# Patient Record
Sex: MALE | Race: WHITE | Employment: OTHER | URBAN - METROPOLITAN AREA
[De-identification: names, ages, dates, MRNs, and addresses within clinical notes are randomized per-mention and may not be internally consistent; named-entity substitution may affect disease eponyms.]

---

## 2018-05-09 ENCOUNTER — HOSPITAL ENCOUNTER (OUTPATIENT)
Dept: MRI IMAGING | Age: 53
Discharge: HOME | End: 2018-05-09
Attending: NURSE PRACTITIONER | Admitting: NURSE PRACTITIONER

## 2018-05-09 DIAGNOSIS — G44.52 NEW DAILY PERSISTENT HEADACHE (NDPH): ICD-10-CM

## 2018-05-09 DIAGNOSIS — G44.52 NEW DAILY PERSISTENT HEADACHE: ICD-10-CM

## 2019-03-02 ENCOUNTER — HOSPITAL ENCOUNTER (OUTPATIENT)
Dept: CT IMAGING | Age: 54
Discharge: HOME | End: 2019-03-02
Payer: MEDICARE

## 2019-03-02 ENCOUNTER — HOSPITAL ENCOUNTER (OUTPATIENT)
Age: 54
Discharge: HOME | End: 2019-03-02
Payer: MEDICARE

## 2019-03-02 DIAGNOSIS — R10.9 ABDOMINAL PAIN, UNSPECIFIED ABDOMINAL LOCATION: ICD-10-CM

## 2019-03-02 DIAGNOSIS — R11.0 NAUSEA: ICD-10-CM

## 2019-03-02 DIAGNOSIS — R19.7 DIARRHEA, UNSPECIFIED TYPE: ICD-10-CM

## 2019-03-02 DIAGNOSIS — R63.4 LOSS OF WEIGHT: ICD-10-CM

## 2019-03-02 DIAGNOSIS — R11.10 VOMITING, INTRACTABILITY OF VOMITING NOT SPECIFIED, PRESENCE OF NAUSEA NOT SPECIFIED, UNSPECIFIED VOMITING TYPE: ICD-10-CM

## 2019-03-02 LAB
ANION GAP SERPL CALCULATED.3IONS-SCNC: 10 MMOL/L (ref 3–16)
BUN BLDV-MCNC: 10 MG/DL (ref 7–20)
CALCIUM SERPL-MCNC: 9.3 MG/DL (ref 8.3–10.6)
CHLORIDE BLD-SCNC: 106 MMOL/L (ref 99–110)
CO2: 23 MMOL/L (ref 21–32)
CREAT SERPL-MCNC: 1.8 MG/DL (ref 0.9–1.3)
GFR AFRICAN AMERICAN: 48
GFR NON-AFRICAN AMERICAN: 39
GLUCOSE BLD-MCNC: 94 MG/DL (ref 70–99)
POTASSIUM SERPL-SCNC: 4.4 MMOL/L (ref 3.5–5.1)
SODIUM BLD-SCNC: 139 MMOL/L (ref 136–145)

## 2019-03-02 PROCEDURE — 36415 COLL VENOUS BLD VENIPUNCTURE: CPT

## 2019-03-02 PROCEDURE — 80048 BASIC METABOLIC PNL TOTAL CA: CPT

## 2019-03-02 PROCEDURE — 6360000004 HC RX CONTRAST MEDICATION

## 2019-03-02 PROCEDURE — 74176 CT ABD & PELVIS W/O CONTRAST: CPT

## 2019-03-02 RX ADMIN — IOPAMIDOL 75 ML: 755 INJECTION, SOLUTION INTRAVENOUS at 13:02

## 2019-08-02 ENCOUNTER — APPOINTMENT (OUTPATIENT)
Dept: CT IMAGING | Age: 54
DRG: 885 | End: 2019-08-02
Payer: MEDICARE

## 2019-08-02 ENCOUNTER — HOSPITAL ENCOUNTER (INPATIENT)
Age: 54
LOS: 3 days | Discharge: HOME | DRG: 885 | End: 2019-08-05
Attending: EMERGENCY MEDICINE | Admitting: PSYCHIATRY & NEUROLOGY
Payer: MEDICARE

## 2019-08-02 DIAGNOSIS — F39 MOOD DISORDER (HCC): Primary | ICD-10-CM

## 2019-08-02 PROBLEM — F32.9 MDD (MAJOR DEPRESSIVE DISORDER), SINGLE EPISODE: Status: ACTIVE | Noted: 2019-08-02

## 2019-08-02 LAB
A/G RATIO: 1.7 (ref 1.1–2.2)
ALBUMIN SERPL-MCNC: 4.3 G/DL (ref 3.4–5)
ALP BLD-CCNC: 47 U/L (ref 40–129)
ALT SERPL-CCNC: 29 U/L (ref 10–40)
AMPHETAMINE SCREEN, URINE: ABNORMAL
ANION GAP SERPL CALCULATED.3IONS-SCNC: 13 MMOL/L (ref 3–16)
AST SERPL-CCNC: 40 U/L (ref 15–37)
BARBITURATE SCREEN URINE: ABNORMAL
BASOPHILS ABSOLUTE: 0.1 K/UL (ref 0–0.2)
BASOPHILS RELATIVE PERCENT: 1.2 %
BENZODIAZEPINE SCREEN, URINE: ABNORMAL
BILIRUB SERPL-MCNC: 0.6 MG/DL (ref 0–1)
BILIRUBIN URINE: NEGATIVE
BLOOD, URINE: NEGATIVE
BUN BLDV-MCNC: 10 MG/DL (ref 7–20)
CALCIUM SERPL-MCNC: 9.3 MG/DL (ref 8.3–10.6)
CANNABINOID SCREEN URINE: POSITIVE
CHLORIDE BLD-SCNC: 97 MMOL/L (ref 99–110)
CLARITY: CLEAR
CO2: 21 MMOL/L (ref 21–32)
COCAINE METABOLITE SCREEN URINE: ABNORMAL
COLOR: YELLOW
CREAT SERPL-MCNC: 1.7 MG/DL (ref 0.9–1.3)
EOSINOPHILS ABSOLUTE: 0.5 K/UL (ref 0–0.6)
EOSINOPHILS RELATIVE PERCENT: 8.7 %
ETHANOL: 15 MG/DL (ref 0–0.08)
GFR AFRICAN AMERICAN: 51
GFR NON-AFRICAN AMERICAN: 42
GLOBULIN: 2.5 G/DL
GLUCOSE BLD-MCNC: 111 MG/DL (ref 70–99)
GLUCOSE URINE: NEGATIVE MG/DL
HCT VFR BLD CALC: 29.4 % (ref 40.5–52.5)
HEMOGLOBIN: 9.9 G/DL (ref 13.5–17.5)
KETONES, URINE: NEGATIVE MG/DL
LEUKOCYTE ESTERASE, URINE: NEGATIVE
LYMPHOCYTES ABSOLUTE: 1.5 K/UL (ref 1–5.1)
LYMPHOCYTES RELATIVE PERCENT: 27.9 %
Lab: ABNORMAL
MCH RBC QN AUTO: 31.5 PG (ref 26–34)
MCHC RBC AUTO-ENTMCNC: 33.5 G/DL (ref 31–36)
MCV RBC AUTO: 94.2 FL (ref 80–100)
METHADONE SCREEN, URINE: ABNORMAL
MICROSCOPIC EXAMINATION: NORMAL
MONOCYTES ABSOLUTE: 0.4 K/UL (ref 0–1.3)
MONOCYTES RELATIVE PERCENT: 7.2 %
NEUTROPHILS ABSOLUTE: 2.9 K/UL (ref 1.7–7.7)
NEUTROPHILS RELATIVE PERCENT: 55 %
NITRITE, URINE: NEGATIVE
OPIATE SCREEN URINE: ABNORMAL
OXYCODONE URINE: ABNORMAL
PDW BLD-RTO: 15.9 % (ref 12.4–15.4)
PH UA: 5
PH UA: 5 (ref 5–8)
PHENCYCLIDINE SCREEN URINE: ABNORMAL
PLATELET # BLD: 341 K/UL (ref 135–450)
PMV BLD AUTO: 7.3 FL (ref 5–10.5)
POTASSIUM SERPL-SCNC: 3.5 MMOL/L (ref 3.5–5.1)
PROPOXYPHENE SCREEN: ABNORMAL
PROTEIN UA: NEGATIVE MG/DL
RBC # BLD: 3.13 M/UL (ref 4.2–5.9)
SODIUM BLD-SCNC: 131 MMOL/L (ref 136–145)
SPECIFIC GRAVITY UA: 1.02 (ref 1–1.03)
TOTAL PROTEIN: 6.8 G/DL (ref 6.4–8.2)
TSH SERPL DL<=0.05 MIU/L-ACNC: 1.64 UIU/ML (ref 0.27–4.2)
URINE REFLEX TO CULTURE: NORMAL
URINE TYPE: NORMAL
UROBILINOGEN, URINE: 0.2 E.U./DL
WBC # BLD: 5.3 K/UL (ref 4–11)

## 2019-08-02 PROCEDURE — 84443 ASSAY THYROID STIM HORMONE: CPT

## 2019-08-02 PROCEDURE — 81003 URINALYSIS AUTO W/O SCOPE: CPT

## 2019-08-02 PROCEDURE — 70450 CT HEAD/BRAIN W/O DYE: CPT

## 2019-08-02 PROCEDURE — 80053 COMPREHEN METABOLIC PANEL: CPT

## 2019-08-02 PROCEDURE — 6370000000 HC RX 637 (ALT 250 FOR IP): Performed by: EMERGENCY MEDICINE

## 2019-08-02 PROCEDURE — 83036 HEMOGLOBIN GLYCOSYLATED A1C: CPT

## 2019-08-02 PROCEDURE — 85025 COMPLETE CBC W/AUTO DIFF WBC: CPT

## 2019-08-02 PROCEDURE — G0480 DRUG TEST DEF 1-7 CLASSES: HCPCS

## 2019-08-02 PROCEDURE — 1240000000 HC EMOTIONAL WELLNESS R&B

## 2019-08-02 PROCEDURE — 80307 DRUG TEST PRSMV CHEM ANLYZR: CPT

## 2019-08-02 PROCEDURE — 99285 EMERGENCY DEPT VISIT HI MDM: CPT

## 2019-08-02 PROCEDURE — 36415 COLL VENOUS BLD VENIPUNCTURE: CPT

## 2019-08-02 RX ORDER — MAGNESIUM HYDROXIDE/ALUMINUM HYDROXICE/SIMETHICONE 120; 1200; 1200 MG/30ML; MG/30ML; MG/30ML
30 SUSPENSION ORAL EVERY 6 HOURS PRN
Status: DISCONTINUED | OUTPATIENT
Start: 2019-08-02 | End: 2019-08-05 | Stop reason: HOSPADM

## 2019-08-02 RX ORDER — NICOTINE 21 MG/24HR
1 PATCH, TRANSDERMAL 24 HOURS TRANSDERMAL DAILY
Status: DISCONTINUED | OUTPATIENT
Start: 2019-08-03 | End: 2019-08-05 | Stop reason: HOSPADM

## 2019-08-02 RX ORDER — CLONAZEPAM 1 MG/1
1 TABLET ORAL 3 TIMES DAILY
Status: ON HOLD | COMMUNITY
End: 2019-08-05 | Stop reason: HOSPADM

## 2019-08-02 RX ORDER — IBUPROFEN 600 MG/1
600 TABLET ORAL ONCE
Status: COMPLETED | OUTPATIENT
Start: 2019-08-02 | End: 2019-08-02

## 2019-08-02 RX ORDER — TRAZODONE HYDROCHLORIDE 50 MG/1
50 TABLET ORAL NIGHTLY PRN
Status: DISCONTINUED | OUTPATIENT
Start: 2019-08-03 | End: 2019-08-05 | Stop reason: HOSPADM

## 2019-08-02 RX ORDER — ACETAMINOPHEN 325 MG/1
650 TABLET ORAL EVERY 4 HOURS PRN
Status: DISCONTINUED | OUTPATIENT
Start: 2019-08-02 | End: 2019-08-05 | Stop reason: HOSPADM

## 2019-08-02 RX ORDER — IBUPROFEN 400 MG/1
400 TABLET ORAL EVERY 6 HOURS PRN
Status: DISCONTINUED | OUTPATIENT
Start: 2019-08-02 | End: 2019-08-05 | Stop reason: HOSPADM

## 2019-08-02 RX ORDER — CLONAZEPAM 1 MG/1
1 TABLET ORAL 3 TIMES DAILY PRN
Status: DISCONTINUED | OUTPATIENT
Start: 2019-08-02 | End: 2019-08-05 | Stop reason: HOSPADM

## 2019-08-02 RX ADMIN — IBUPROFEN 600 MG: 600 TABLET, FILM COATED ORAL at 16:56

## 2019-08-02 ASSESSMENT — SLEEP AND FATIGUE QUESTIONNAIRES
DIFFICULTY FALLING ASLEEP: YES
RESTFUL SLEEP: NO
DIFFICULTY ARISING: NO
AVERAGE NUMBER OF SLEEP HOURS: 1
DIFFICULTY STAYING ASLEEP: YES
DO YOU HAVE DIFFICULTY SLEEPING: YES
DO YOU USE A SLEEP AID: YES
SLEEP PATTERN: DIFFICULTY FALLING ASLEEP;DISTURBED/INTERRUPTED SLEEP

## 2019-08-02 ASSESSMENT — PAIN DESCRIPTION - PROGRESSION: CLINICAL_PROGRESSION: NOT CHANGED

## 2019-08-02 ASSESSMENT — PAIN SCALES - GENERAL
PAINLEVEL_OUTOF10: 10
PAINLEVEL_OUTOF10: 6

## 2019-08-02 ASSESSMENT — PAIN DESCRIPTION - LOCATION
LOCATION: GENERALIZED
LOCATION: SHOULDER;NECK

## 2019-08-02 ASSESSMENT — PAIN DESCRIPTION - PAIN TYPE: TYPE: CHRONIC PAIN

## 2019-08-02 ASSESSMENT — PAIN - FUNCTIONAL ASSESSMENT: PAIN_FUNCTIONAL_ASSESSMENT: ACTIVITIES ARE NOT PREVENTED

## 2019-08-02 ASSESSMENT — PAIN DESCRIPTION - DESCRIPTORS: DESCRIPTORS: CONSTANT

## 2019-08-02 ASSESSMENT — PAIN DESCRIPTION - FREQUENCY: FREQUENCY: CONTINUOUS

## 2019-08-02 ASSESSMENT — PAIN DESCRIPTION - ORIENTATION: ORIENTATION: LEFT

## 2019-08-02 ASSESSMENT — LIFESTYLE VARIABLES: HISTORY_ALCOHOL_USE: NO

## 2019-08-02 ASSESSMENT — PAIN DESCRIPTION - ONSET: ONSET: UNABLE TO ASSESS
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
## 2019-08-03 PROBLEM — E44.0 PROTEIN-CALORIE MALNUTRITION, MODERATE (HCC): Status: ACTIVE | Noted: 2019-08-03

## 2019-08-03 PROCEDURE — 99222 1ST HOSP IP/OBS MODERATE 55: CPT | Performed by: PHYSICIAN ASSISTANT

## 2019-08-03 PROCEDURE — 6370000000 HC RX 637 (ALT 250 FOR IP): Performed by: PSYCHIATRY & NEUROLOGY

## 2019-08-03 PROCEDURE — 1240000000 HC EMOTIONAL WELLNESS R&B

## 2019-08-03 PROCEDURE — 6370000000 HC RX 637 (ALT 250 FOR IP): Performed by: PHYSICIAN ASSISTANT

## 2019-08-03 RX ORDER — FENOFIBRATE 54 MG/1
54 TABLET ORAL DAILY
Status: COMPLETED | OUTPATIENT
Start: 2019-08-03 | End: 2019-08-03

## 2019-08-03 RX ORDER — METOPROLOL TARTRATE 50 MG/1
100 TABLET, FILM COATED ORAL 2 TIMES DAILY
Status: DISCONTINUED | OUTPATIENT
Start: 2019-08-03 | End: 2019-08-05 | Stop reason: HOSPADM

## 2019-08-03 RX ORDER — VENLAFAXINE HYDROCHLORIDE 75 MG/1
150 CAPSULE, EXTENDED RELEASE ORAL
Status: DISCONTINUED | OUTPATIENT
Start: 2019-08-04 | End: 2019-08-05 | Stop reason: HOSPADM

## 2019-08-03 RX ORDER — TAMSULOSIN HYDROCHLORIDE 0.4 MG/1
0.4 CAPSULE ORAL DAILY
Status: DISCONTINUED | OUTPATIENT
Start: 2019-08-03 | End: 2019-08-05 | Stop reason: HOSPADM

## 2019-08-03 RX ORDER — QUETIAPINE FUMARATE 200 MG/1
400 TABLET, FILM COATED ORAL NIGHTLY
Status: DISCONTINUED | OUTPATIENT
Start: 2019-08-03 | End: 2019-08-05 | Stop reason: HOSPADM

## 2019-08-03 RX ORDER — AMLODIPINE BESYLATE 5 MG/1
10 TABLET ORAL DAILY
Status: DISCONTINUED | OUTPATIENT
Start: 2019-08-03 | End: 2019-08-05 | Stop reason: HOSPADM

## 2019-08-03 RX ORDER — DOCUSATE SODIUM 100 MG/1
100 CAPSULE, LIQUID FILLED ORAL 2 TIMES DAILY PRN
Status: DISCONTINUED | OUTPATIENT
Start: 2019-08-03 | End: 2019-08-05 | Stop reason: HOSPADM

## 2019-08-03 RX ORDER — PANTOPRAZOLE SODIUM 40 MG/1
40 TABLET, DELAYED RELEASE ORAL DAILY
Status: DISCONTINUED | OUTPATIENT
Start: 2019-08-03 | End: 2019-08-05 | Stop reason: HOSPADM

## 2019-08-03 RX ADMIN — METFORMIN HYDROCHLORIDE 850 MG: 850 TABLET ORAL at 16:49

## 2019-08-03 RX ADMIN — METOPROLOL TARTRATE 100 MG: 50 TABLET ORAL at 19:53

## 2019-08-03 RX ADMIN — PANTOPRAZOLE SODIUM 40 MG: 40 TABLET, DELAYED RELEASE ORAL at 14:16

## 2019-08-03 RX ADMIN — CLONAZEPAM 1 MG: 1 TABLET ORAL at 14:15

## 2019-08-03 RX ADMIN — ACETAMINOPHEN 650 MG: 325 TABLET ORAL at 15:53

## 2019-08-03 RX ADMIN — QUETIAPINE FUMARATE 400 MG: 200 TABLET ORAL at 19:53

## 2019-08-03 RX ADMIN — FENOFIBRATE 54 MG: 54 TABLET ORAL at 14:15

## 2019-08-03 RX ADMIN — AMLODIPINE BESYLATE 10 MG: 5 TABLET ORAL at 14:15

## 2019-08-03 RX ADMIN — HYDROCORTISONE: 25 CREAM TOPICAL at 18:16

## 2019-08-03 RX ADMIN — METOPROLOL TARTRATE 100 MG: 50 TABLET ORAL at 14:15

## 2019-08-03 RX ADMIN — TAMSULOSIN HYDROCHLORIDE 0.4 MG: 0.4 CAPSULE ORAL at 14:15

## 2019-08-03 RX ADMIN — CLONAZEPAM 1 MG: 1 TABLET ORAL at 22:26

## 2019-08-03 RX ADMIN — IBUPROFEN 400 MG: 400 TABLET ORAL at 10:11

## 2019-08-03 RX ADMIN — ACETAMINOPHEN 650 MG: 325 TABLET ORAL at 08:42

## 2019-08-03 ASSESSMENT — PAIN SCALES - GENERAL
PAINLEVEL_OUTOF10: 6
PAINLEVEL_OUTOF10: 5
PAINLEVEL_OUTOF10: 7
PAINLEVEL_OUTOF10: 4
PAINLEVEL_OUTOF10: 0
PAINLEVEL_OUTOF10: 1
PAINLEVEL_OUTOF10: 10

## 2019-08-03 ASSESSMENT — LIFESTYLE VARIABLES: HISTORY_ALCOHOL_USE: YES

## 2019-08-03 NOTE — GROUP NOTE
Group Therapy Note    Date: August 3    Group Start Time: 1000  Group End Time: 1100  Group Topic: Healthy Living/Wellness    [address], RN        Group Therapy Note    Pt attended wellness group on the unit about boundaries          Attendees: 7/14         Patient's Goal:  To learn about healthy boundaries and how to set and follow them    Notes:  Pt attended group and participated appropriately at times. Status After Intervention:  Unchanged    Participation Level:  Active Listener and Interactive    Participation Quality: Appropriate, Attentive, Sharing and Supportive      Speech:  normal      Thought Process/Content: Linear      Affective Functioning: Congruent      Mood: anxious and depressed      Level of consciousness:  Alert, Oriented x4 and Attentive      Response to Learning: Able to verbalize current knowledge/experience, Able to verbalize/acknowledge new learning and Able to retain information      Endings: None Reported    Modes of Intervention: Education and Problem-solving      Discipline Responsible: Registered Nurse      Signature:  Victoria Muñoz RN

## 2019-08-03 NOTE — PLAN OF CARE
Nutrition Problem:  Moderate malnutrition  Intervention: Food and/or Nutrient Delivery: Continue current diet, Start ONS  Nutritional Goals: pt will consume > 75% of meals and supps and weight will be 130# or >

## 2019-08-04 LAB
ESTIMATED AVERAGE GLUCOSE: 105.4 MG/DL
HBA1C MFR BLD: 5.3 %

## 2019-08-04 PROCEDURE — 6370000000 HC RX 637 (ALT 250 FOR IP): Performed by: PHYSICIAN ASSISTANT

## 2019-08-04 PROCEDURE — 6370000000 HC RX 637 (ALT 250 FOR IP): Performed by: PSYCHIATRY & NEUROLOGY

## 2019-08-04 PROCEDURE — 1240000000 HC EMOTIONAL WELLNESS R&B

## 2019-08-04 RX ADMIN — METFORMIN HYDROCHLORIDE 850 MG: 850 TABLET ORAL at 18:16

## 2019-08-04 RX ADMIN — METOPROLOL TARTRATE 100 MG: 50 TABLET ORAL at 21:02

## 2019-08-04 RX ADMIN — VENLAFAXINE HYDROCHLORIDE 150 MG: 75 CAPSULE, EXTENDED RELEASE ORAL at 08:34

## 2019-08-04 RX ADMIN — HYDROCORTISONE: 25 CREAM TOPICAL at 08:38

## 2019-08-04 RX ADMIN — CLONAZEPAM 1 MG: 1 TABLET ORAL at 08:34

## 2019-08-04 RX ADMIN — IBUPROFEN 400 MG: 400 TABLET ORAL at 11:34

## 2019-08-04 RX ADMIN — TAMSULOSIN HYDROCHLORIDE 0.4 MG: 0.4 CAPSULE ORAL at 08:34

## 2019-08-04 RX ADMIN — CLONAZEPAM 1 MG: 1 TABLET ORAL at 14:40

## 2019-08-04 RX ADMIN — HYDROCORTISONE: 25 CREAM TOPICAL at 21:05

## 2019-08-04 RX ADMIN — ACETAMINOPHEN 650 MG: 325 TABLET ORAL at 09:39

## 2019-08-04 RX ADMIN — QUETIAPINE FUMARATE 400 MG: 200 TABLET ORAL at 21:02

## 2019-08-04 RX ADMIN — METOPROLOL TARTRATE 100 MG: 50 TABLET ORAL at 08:34

## 2019-08-04 RX ADMIN — ACETAMINOPHEN 650 MG: 325 TABLET ORAL at 16:11

## 2019-08-04 RX ADMIN — IBUPROFEN 400 MG: 400 TABLET ORAL at 19:24

## 2019-08-04 RX ADMIN — AMLODIPINE BESYLATE 10 MG: 5 TABLET ORAL at 08:33

## 2019-08-04 RX ADMIN — PANTOPRAZOLE SODIUM 40 MG: 40 TABLET, DELAYED RELEASE ORAL at 08:34

## 2019-08-04 RX ADMIN — METFORMIN HYDROCHLORIDE 850 MG: 850 TABLET ORAL at 08:34

## 2019-08-04 ASSESSMENT — PAIN SCALES - GENERAL
PAINLEVEL_OUTOF10: 6
PAINLEVEL_OUTOF10: 8
PAINLEVEL_OUTOF10: 6
PAINLEVEL_OUTOF10: 1
PAINLEVEL_OUTOF10: 6

## 2019-08-04 NOTE — PLAN OF CARE
Patient rates Depression 1/10 and Anxiety 0/10. Patient denies SI/HI/A/V/H. Patient seclusive to his bed and room. Patient refused to attended wrap up group. Patient took HS medications. Patient resting quietly in bed. No c/o's voiced at present.

## 2019-08-04 NOTE — BH NOTE
22:26 Patient requesting his Klonopin 1 mg. Anxiety rated 10/10. Patient medicated with Klonopin 1 mg po for anxiety.
[facility]  Initial Interdisciplinary Treatment Plan NOTE    Review Date & Time: 08/03/2019 1000    Patient was not in treatment team    Admission Type:   Admission Type: Involuntary    Reason for admission:  Reason for Admission: Pt brought in by cousin       Estimated Length of Stay Update:  1-3 days   Estimated Discharge Date Update: 1-3 days     PATIENT STRENGTHS:  Patient Strengths Strengths: Communication, Connection to output provider  Patient Strengths and Limitations:Limitations: Tendency to isolate self  Addictive Behavior:Addictive Behavior  In the past 3 months, have you felt or has someone told you that you have a problem with:  : None  Do you have a history of Chemical Use?: Yes(pt states he has been clean 10 years)  Do you have a history of Alcohol Use?: Yes  Do you have a history of Street Drug Abuse?: Yes  Histroy of Prescripton Drug Abuse?: Yes  Medical Problems:  Past Medical History:   Diagnosis Date    Anxiety     Arthritis     Clostridium difficile diarrhea 1/10/15    Depression     Difficulty urinating     DM [facility]) [facility]     Hematoma     pt unsure where it was    Hx of blood clots     pt unsure where    Hyperlipidemia     Hypertension     Pneumonia     Psychosis ([facility]        EDUCATION:   Learner Progress Toward Treatment Goals: Reviewed results and recommendations of this team    Method: Individual    Outcome: Verbalized understanding    PATIENT GOALS: \" to see the doctor. \"     PLAN/TREATMENT RECOMMENDATIONS UPDATE: maintain safety, medication management     GOALS UPDATE:   Time frame for Short-Term Goals: by time of discharge     Rodo Gamez RN
Pt c/o headache.  PRN motrin given per request.
(included triggers and roadblocks)                    ( )  Coping skills (new ways to manage stress, exercise, relaxation techniques, changing routine, distraction)                                                           ( )  Basic information about quitting (benefits of quitting, techniques in how to quit, available resources  ( ) Referral for counseling faxed to [facility]                                           ( ) Patient refused counseling  ( ) Patient has not smoked in the last 30 days    Metabolic Screening:    Lab Results   Component Value Date    LABA1C 7.6 01/09/2015       No results found for: CHOL  No results found for: TRIG  No results found for: HDL  No components found for: LDLCAL  No results found for: LABVLDL      Body mass index is 19.37 kg/m². BP Readings from Last 2 Encounters:   08/02/19 (!) 154/70   11/30/16 126/87           Pt admitted with followings belongings:  Dentures: None  Vision - Corrective Lenses: None  Hearing Aid: None  Jewelry: None  Body Piercings Removed: N/A  Clothing: Pants, Shirt  Were All Patient Medications Collected?: Not Applicable  Other Valuables: Money (Comment)($440 to safe)     Valuables placed in locker. Valuables placed in safe in security envelope. Patient's home medications were none. Patient oriented to surroundings and program expectations and copy of patient rights given. Received admission packet:  yes. Consents reviewed, signed yes. Refused none. Patient verbalize understanding:  yes.     Patient education on precautions: yes                   Ayaka Herring, RN

## 2019-08-05 VITALS
HEIGHT: 66 IN | BODY MASS INDEX: 19.29 KG/M2 | SYSTOLIC BLOOD PRESSURE: 113 MMHG | WEIGHT: 120 LBS | OXYGEN SATURATION: 99 % | TEMPERATURE: 98 F | RESPIRATION RATE: 16 BRPM | HEART RATE: 82 BPM | DIASTOLIC BLOOD PRESSURE: 61 MMHG

## 2019-08-05 PROCEDURE — 6370000000 HC RX 637 (ALT 250 FOR IP): Performed by: PSYCHIATRY & NEUROLOGY

## 2019-08-05 PROCEDURE — 5130000000 HC BRIDGE APPOINTMENT

## 2019-08-05 PROCEDURE — 6370000000 HC RX 637 (ALT 250 FOR IP): Performed by: PHYSICIAN ASSISTANT

## 2019-08-05 PROCEDURE — 99239 HOSP IP/OBS DSCHRG MGMT >30: CPT | Performed by: PSYCHIATRY & NEUROLOGY

## 2019-08-05 RX ORDER — QUETIAPINE FUMARATE 400 MG/1
400 TABLET, FILM COATED ORAL NIGHTLY
Qty: 60 TABLET | Refills: 3 | Status: SHIPPED | OUTPATIENT
Start: 2019-08-05 | End: 2019-08-22 | Stop reason: SDUPTHER

## 2019-08-05 RX ORDER — METOPROLOL TARTRATE 100 MG/1
100 TABLET ORAL 2 TIMES DAILY
Qty: 60 TABLET | Refills: 0 | Status: ON HOLD | OUTPATIENT
Start: 2019-08-05 | End: 2019-08-26 | Stop reason: HOSPADM

## 2019-08-05 RX ORDER — VENLAFAXINE HYDROCHLORIDE 150 MG/1
150 CAPSULE, EXTENDED RELEASE ORAL
Qty: 30 CAPSULE | Refills: 3 | Status: ON HOLD | OUTPATIENT
Start: 2019-08-06 | End: 2019-08-26 | Stop reason: HOSPADM

## 2019-08-05 RX ORDER — TAMSULOSIN HYDROCHLORIDE 0.4 MG/1
0.4 CAPSULE ORAL DAILY
Qty: 30 CAPSULE | Refills: 0 | Status: ON HOLD | OUTPATIENT
Start: 2019-08-05 | End: 2020-03-11 | Stop reason: HOSPADM

## 2019-08-05 RX ORDER — PANTOPRAZOLE SODIUM 40 MG/1
40 TABLET, DELAYED RELEASE ORAL DAILY
Qty: 30 TABLET | Refills: 3 | Status: ON HOLD | OUTPATIENT
Start: 2019-08-06 | End: 2020-03-11 | Stop reason: HOSPADM

## 2019-08-05 RX ORDER — AMLODIPINE BESYLATE 10 MG/1
10 TABLET ORAL DAILY
Qty: 30 TABLET | Refills: 0 | Status: ON HOLD | OUTPATIENT
Start: 2019-08-05 | End: 2019-08-26 | Stop reason: HOSPADM

## 2019-08-05 RX ORDER — PANTOPRAZOLE SODIUM 40 MG/1
40 TABLET, DELAYED RELEASE ORAL DAILY
Qty: 30 TABLET | Refills: 0 | Status: SHIPPED | OUTPATIENT
Start: 2019-08-05 | End: 2019-08-08

## 2019-08-05 RX ORDER — TAMSULOSIN HYDROCHLORIDE 0.4 MG/1
0.4 CAPSULE ORAL DAILY
Qty: 30 CAPSULE | Refills: 3 | Status: SHIPPED | OUTPATIENT
Start: 2019-08-06 | End: 2019-08-22 | Stop reason: SDUPTHER

## 2019-08-05 RX ORDER — QUETIAPINE FUMARATE 400 MG/1
800 TABLET, FILM COATED ORAL NIGHTLY
Qty: 60 TABLET | Refills: 0 | Status: ON HOLD | OUTPATIENT
Start: 2019-08-05 | End: 2020-03-11 | Stop reason: HOSPADM

## 2019-08-05 RX ADMIN — AMLODIPINE BESYLATE 10 MG: 5 TABLET ORAL at 08:47

## 2019-08-05 RX ADMIN — VENLAFAXINE HYDROCHLORIDE 150 MG: 75 CAPSULE, EXTENDED RELEASE ORAL at 08:46

## 2019-08-05 RX ADMIN — TAMSULOSIN HYDROCHLORIDE 0.4 MG: 0.4 CAPSULE ORAL at 08:47

## 2019-08-05 RX ADMIN — NICOTINE POLACRILEX 2 MG: 2 GUM, CHEWING BUCCAL at 13:53

## 2019-08-05 RX ADMIN — CLONAZEPAM 1 MG: 1 TABLET ORAL at 13:53

## 2019-08-05 RX ADMIN — METFORMIN HYDROCHLORIDE 850 MG: 850 TABLET ORAL at 08:54

## 2019-08-05 RX ADMIN — CLONAZEPAM 1 MG: 1 TABLET ORAL at 08:54

## 2019-08-05 RX ADMIN — PANTOPRAZOLE SODIUM 40 MG: 40 TABLET, DELAYED RELEASE ORAL at 08:47

## 2019-08-05 RX ADMIN — METOPROLOL TARTRATE 100 MG: 50 TABLET ORAL at 08:48

## 2019-08-05 NOTE — PROGRESS NOTES
Chief Complaint:  psychosis  Patients chart was reviewed and collaborated with staff as well around discharge planning. Reviewed with the patient. Dictated discharge summary. At this time patient is not probateable or holdable and is not suicidal/homicidal. Spent 35 minutes on discharge, and more then 50 % of that time was spent with counseling patient on discharge goals.
Orders: None  · Anthropometric Measures:  · Ht: 5' 6\" (167.6 cm)   · Current Body Wt: 130 lb (59 kg)  · Admission Body Wt: 111 lb (50.3 kg)(REPORTED)  · Usual Body Wt: 240 lb (108.9 kg)  · % Weight Change:  ,  45% in 1 year per report   · Ideal Body Wt: 142 lb (64.4 kg), % Ideal Body 91  · BMI Classification: BMI <18.5 Underweight    Nutrition Interventions:   Continue current diet, Start ONS  Continued Inpatient Monitoring, Coordination of Care, Coordination of Community Care    Nutrition Evaluation:   · Evaluation: Goals set   · Goals: pt will consume > 75% of meals and supps and weight will be 130# or >     · Monitoring: Meal Intake, Supplement Intake, Weight      Electronically signed by Fantasma Bedolla RD, LD on 8/3/19 at [facility]    Contact Number: [phone]

## 2019-08-08 ENCOUNTER — HOSPITAL ENCOUNTER (EMERGENCY)
Age: 54
Discharge: HOME | End: 2019-08-08
Attending: EMERGENCY MEDICINE
Payer: MEDICARE

## 2019-08-08 VITALS
WEIGHT: 135 LBS | HEIGHT: 67 IN | OXYGEN SATURATION: 100 % | TEMPERATURE: 98.6 F | RESPIRATION RATE: 16 BRPM | BODY MASS INDEX: 21.19 KG/M2 | SYSTOLIC BLOOD PRESSURE: 144 MMHG | DIASTOLIC BLOOD PRESSURE: 88 MMHG | HEART RATE: 113 BPM

## 2019-08-08 DIAGNOSIS — F10.920 ACUTE ALCOHOLIC INTOXICATION WITHOUT COMPLICATION (HCC): Primary | ICD-10-CM
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
## 2019-08-08 LAB
A/G RATIO: 1.6 (ref 1.1–2.2)
ACETAMINOPHEN LEVEL: <5 UG/ML (ref 10–30)
ALBUMIN SERPL-MCNC: 4.7 G/DL (ref 3.4–5)
ALP BLD-CCNC: 56 U/L (ref 40–129)
ALT SERPL-CCNC: 15 U/L (ref 10–40)
AMPHETAMINE SCREEN, URINE: ABNORMAL
ANION GAP SERPL CALCULATED.3IONS-SCNC: 17 MMOL/L (ref 3–16)
AST SERPL-CCNC: 34 U/L (ref 15–37)
BARBITURATE SCREEN URINE: ABNORMAL
BASOPHILS ABSOLUTE: 0.1 K/UL (ref 0–0.2)
BASOPHILS RELATIVE PERCENT: 1.1 %
BENZODIAZEPINE SCREEN, URINE: ABNORMAL
BILIRUB SERPL-MCNC: 0.3 MG/DL (ref 0–1)
BUN BLDV-MCNC: 21 MG/DL (ref 7–20)
CALCIUM SERPL-MCNC: 9.1 MG/DL (ref 8.3–10.6)
CANNABINOID SCREEN URINE: POSITIVE
CHLORIDE BLD-SCNC: 91 MMOL/L (ref 99–110)
CO2: 21 MMOL/L (ref 21–32)
COCAINE METABOLITE SCREEN URINE: ABNORMAL
CREAT SERPL-MCNC: 2 MG/DL (ref 0.9–1.3)
EOSINOPHILS ABSOLUTE: 0.3 K/UL (ref 0–0.6)
EOSINOPHILS RELATIVE PERCENT: 5.7 %
ETHANOL: 193 MG/DL (ref 0–0.08)
ETHANOL: 49 MG/DL (ref 0–0.08)
GFR AFRICAN AMERICAN: 42
GFR NON-AFRICAN AMERICAN: 35
GLOBULIN: 3 G/DL
GLUCOSE BLD-MCNC: 143 MG/DL (ref 70–99)
HCT VFR BLD CALC: 30.6 % (ref 40.5–52.5)
HEMOGLOBIN: 10.4 G/DL (ref 13.5–17.5)
LYMPHOCYTES ABSOLUTE: 1.6 K/UL (ref 1–5.1)
LYMPHOCYTES RELATIVE PERCENT: 26.9 %
Lab: ABNORMAL
MAGNESIUM: 2.2 MG/DL (ref 1.8–2.4)
MCH RBC QN AUTO: 32.1 PG (ref 26–34)
MCHC RBC AUTO-ENTMCNC: 33.8 G/DL (ref 31–36)
MCV RBC AUTO: 94.9 FL (ref 80–100)
METHADONE SCREEN, URINE: ABNORMAL
MONOCYTES ABSOLUTE: 0.5 K/UL (ref 0–1.3)
MONOCYTES RELATIVE PERCENT: 9.1 %
NEUTROPHILS ABSOLUTE: 3.4 K/UL (ref 1.7–7.7)
NEUTROPHILS RELATIVE PERCENT: 57.2 %
OPIATE SCREEN URINE: ABNORMAL
OXYCODONE URINE: ABNORMAL
PDW BLD-RTO: 15.8 % (ref 12.4–15.4)
PH UA: 5
PHENCYCLIDINE SCREEN URINE: ABNORMAL
PLATELET # BLD: 291 K/UL (ref 135–450)
PMV BLD AUTO: 7.7 FL (ref 5–10.5)
POTASSIUM REFLEX MAGNESIUM: 3.5 MMOL/L (ref 3.5–5.1)
PROPOXYPHENE SCREEN: ABNORMAL
RBC # BLD: 3.23 M/UL (ref 4.2–5.9)
SALICYLATE, SERUM: 1.4 MG/DL (ref 15–30)
SODIUM BLD-SCNC: 129 MMOL/L (ref 136–145)
TOTAL PROTEIN: 7.7 G/DL (ref 6.4–8.2)
WBC # BLD: 5.9 K/UL (ref 4–11)

## 2019-08-08 PROCEDURE — G0480 DRUG TEST DEF 1-7 CLASSES: HCPCS

## 2019-08-08 PROCEDURE — 6370000000 HC RX 637 (ALT 250 FOR IP): Performed by: EMERGENCY MEDICINE

## 2019-08-08 PROCEDURE — 99284 EMERGENCY DEPT VISIT MOD MDM: CPT

## 2019-08-08 PROCEDURE — 83735 ASSAY OF MAGNESIUM: CPT

## 2019-08-08 PROCEDURE — 80053 COMPREHEN METABOLIC PANEL: CPT

## 2019-08-08 PROCEDURE — 85025 COMPLETE CBC W/AUTO DIFF WBC: CPT

## 2019-08-08 PROCEDURE — 80307 DRUG TEST PRSMV CHEM ANLYZR: CPT

## 2019-08-08 RX ORDER — LORAZEPAM 1 MG/1
1 TABLET ORAL ONCE
Status: COMPLETED | OUTPATIENT
Start: 2019-08-08 | End: 2019-08-08

## 2019-08-08 RX ADMIN — NICOTINE POLACRILEX 2 MG: 2 GUM, CHEWING BUCCAL at 01:30

## 2019-08-08 RX ADMIN — LORAZEPAM 1 MG: 1 TABLET ORAL at 01:37

## 2019-08-08 NOTE — ED PROVIDER NOTES
Panel w/ Reflex to MG    Magnesium    EKG 12 Lead       Medications ordered for this visit  Orders Placed This Encounter   Medications    nicotine polacrilex (NICORETTE) gum 2 mg    LORazepam (ATIVAN) tablet 1 mg       This patient presented to the ED at risk of harming themselves or others, meeting criteria to be placed on an involuntary hold. Involuntary form was completed and pt was informed that they are being placed on this hold. Pt  does not have a history of psychological problems. They are nontoxic appearing. Vital signs were reviewed and addressed. They were examined for acute injuries and illness. Appropriate tests were ordered in the ED to medically clear them for transfer and/or evaluation by a behavioral health unit. There is no significant evidence of underlying medical etiology for the pts current psychological issue, such as CVA, SAH, cerebral tumor, acute coronary syndrome, toxicity, shock, sepsis, electrolyte imbalance, thyroid irregularity, or intoxication needing inpatient detox. Final Impression    1. Acute alcoholic intoxication without complication ([address]    2. Firesetting, initial encounter      Regard to the tachycardia, repeat vitals were done and I was told that the patient was no longer tachycardic but that was not updated in his vitals. Blood pressure (!) 144/88, pulse 113, temperature 98.6 °F (37 °C), temperature source Temporal, resp. rate 16, height 5' 7\" (1.702 m), weight 135 lb (61.2 kg), SpO2 100 %. Patient and/or companions verbalized understanding of the ED workup, any relevant findings as well as any incidental findings, and the disposition and plan. Questions sought and answered with the patient and/or family members. They voice understanding and agree with plan. Disposition  Pt is in stable condition upon Transfer to [facility] - medically cleared for psychiatric evaluation. The note was completed using Dragon voice recognition transcription.
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
## 2019-08-22 ENCOUNTER — APPOINTMENT (OUTPATIENT)
Dept: CT IMAGING | Age: 54
DRG: 885 | End: 2019-08-22
Payer: MEDICARE

## 2019-08-22 ENCOUNTER — HOSPITAL ENCOUNTER (INPATIENT)
Age: 54
LOS: 4 days | Discharge: HOME | DRG: 885 | End: 2019-08-26
Attending: EMERGENCY MEDICINE | Admitting: PSYCHIATRY & NEUROLOGY
Payer: MEDICARE

## 2019-08-22 DIAGNOSIS — F29 PSYCHOSIS, UNSPECIFIED PSYCHOSIS TYPE (HCC): Primary | ICD-10-CM

## 2019-08-22 LAB
A/G RATIO: 1.6 (ref 1.1–2.2)
ALBUMIN SERPL-MCNC: 4.1 G/DL (ref 3.4–5)
ALP BLD-CCNC: 47 U/L (ref 40–129)
ALT SERPL-CCNC: 15 U/L (ref 10–40)
AMPHETAMINE SCREEN, URINE: ABNORMAL
ANION GAP SERPL CALCULATED.3IONS-SCNC: 13 MMOL/L (ref 3–16)
AST SERPL-CCNC: 21 U/L (ref 15–37)
BARBITURATE SCREEN URINE: ABNORMAL
BASOPHILS ABSOLUTE: 0.1 K/UL (ref 0–0.2)
BASOPHILS RELATIVE PERCENT: 1.5 %
BENZODIAZEPINE SCREEN, URINE: ABNORMAL
BILIRUB SERPL-MCNC: 0.3 MG/DL (ref 0–1)
BILIRUBIN URINE: NEGATIVE
BLOOD, URINE: NEGATIVE
BUN BLDV-MCNC: 14 MG/DL (ref 7–20)
CALCIUM SERPL-MCNC: 10 MG/DL (ref 8.3–10.6)
CANNABINOID SCREEN URINE: POSITIVE
CHLORIDE BLD-SCNC: 101 MMOL/L (ref 99–110)
CLARITY: CLEAR
CO2: 24 MMOL/L (ref 21–32)
COCAINE METABOLITE SCREEN URINE: ABNORMAL
COLOR: YELLOW
CREAT SERPL-MCNC: 1.5 MG/DL (ref 0.9–1.3)
EOSINOPHILS ABSOLUTE: 0.4 K/UL (ref 0–0.6)
EOSINOPHILS RELATIVE PERCENT: 8.1 %
ETHANOL: NORMAL MG/DL (ref 0–0.08)
GFR AFRICAN AMERICAN: 59
GFR NON-AFRICAN AMERICAN: 48
GLOBULIN: 2.6 G/DL
GLUCOSE BLD-MCNC: 100 MG/DL (ref 70–99)
GLUCOSE URINE: NEGATIVE MG/DL
HCT VFR BLD CALC: 29.8 % (ref 40.5–52.5)
HEMOGLOBIN: 9.9 G/DL (ref 13.5–17.5)
KETONES, URINE: NEGATIVE MG/DL
LEUKOCYTE ESTERASE, URINE: NEGATIVE
LYMPHOCYTES ABSOLUTE: 2 K/UL (ref 1–5.1)
LYMPHOCYTES RELATIVE PERCENT: 39.6 %
Lab: ABNORMAL
MAGNESIUM: 2 MG/DL (ref 1.8–2.4)
MCH RBC QN AUTO: 32.2 PG (ref 26–34)
MCHC RBC AUTO-ENTMCNC: 33.2 G/DL (ref 31–36)
MCV RBC AUTO: 96.9 FL (ref 80–100)
METHADONE SCREEN, URINE: ABNORMAL
MICROSCOPIC EXAMINATION: NORMAL
MONOCYTES ABSOLUTE: 0.5 K/UL (ref 0–1.3)
MONOCYTES RELATIVE PERCENT: 9.7 %
NEUTROPHILS ABSOLUTE: 2.1 K/UL (ref 1.7–7.7)
NEUTROPHILS RELATIVE PERCENT: 41.1 %
NITRITE, URINE: NEGATIVE
OPIATE SCREEN URINE: ABNORMAL
OXYCODONE URINE: ABNORMAL
PDW BLD-RTO: 15.1 % (ref 12.4–15.4)
PH UA: 6
PH UA: 6 (ref 5–8)
PHENCYCLIDINE SCREEN URINE: ABNORMAL
PLATELET # BLD: 362 K/UL (ref 135–450)
PMV BLD AUTO: 7.5 FL (ref 5–10.5)
POTASSIUM REFLEX MAGNESIUM: 3.4 MMOL/L (ref 3.5–5.1)
PROPOXYPHENE SCREEN: ABNORMAL
PROTEIN UA: NEGATIVE MG/DL
RBC # BLD: 3.08 M/UL (ref 4.2–5.9)
SODIUM BLD-SCNC: 138 MMOL/L (ref 136–145)
SPECIFIC GRAVITY UA: 1.01 (ref 1–1.03)
TOTAL PROTEIN: 6.7 G/DL (ref 6.4–8.2)
TSH SERPL DL<=0.05 MIU/L-ACNC: 0.91 UIU/ML (ref 0.27–4.2)
URINE REFLEX TO CULTURE: NORMAL
URINE TYPE: NORMAL
UROBILINOGEN, URINE: 0.2 E.U./DL
WBC # BLD: 5.1 K/UL (ref 4–11)

## 2019-08-22 PROCEDURE — 99285 EMERGENCY DEPT VISIT HI MDM: CPT

## 2019-08-22 PROCEDURE — 6370000000 HC RX 637 (ALT 250 FOR IP): Performed by: EMERGENCY MEDICINE

## 2019-08-22 PROCEDURE — 81003 URINALYSIS AUTO W/O SCOPE: CPT

## 2019-08-22 PROCEDURE — 1240000000 HC EMOTIONAL WELLNESS R&B

## 2019-08-22 PROCEDURE — 6360000004 HC RX CONTRAST MEDICATION: Performed by: EMERGENCY MEDICINE

## 2019-08-22 PROCEDURE — 36415 COLL VENOUS BLD VENIPUNCTURE: CPT

## 2019-08-22 PROCEDURE — G0480 DRUG TEST DEF 1-7 CLASSES: HCPCS

## 2019-08-22 PROCEDURE — 85025 COMPLETE CBC W/AUTO DIFF WBC: CPT

## 2019-08-22 PROCEDURE — 80053 COMPREHEN METABOLIC PANEL: CPT

## 2019-08-22 PROCEDURE — 80307 DRUG TEST PRSMV CHEM ANLYZR: CPT

## 2019-08-22 PROCEDURE — 99222 1ST HOSP IP/OBS MODERATE 55: CPT | Performed by: PHYSICIAN ASSISTANT

## 2019-08-22 PROCEDURE — 6370000000 HC RX 637 (ALT 250 FOR IP): Performed by: NURSE PRACTITIONER

## 2019-08-22 PROCEDURE — 83735 ASSAY OF MAGNESIUM: CPT

## 2019-08-22 PROCEDURE — 6370000000 HC RX 637 (ALT 250 FOR IP): Performed by: PHYSICIAN ASSISTANT

## 2019-08-22 PROCEDURE — 74177 CT ABD & PELVIS W/CONTRAST: CPT

## 2019-08-22 PROCEDURE — 84443 ASSAY THYROID STIM HORMONE: CPT

## 2019-08-22 RX ORDER — AMLODIPINE BESYLATE 5 MG/1
10 TABLET ORAL ONCE
Status: COMPLETED | OUTPATIENT
Start: 2019-08-22 | End: 2019-08-22

## 2019-08-22 RX ORDER — IBUPROFEN 400 MG/1
400 TABLET ORAL EVERY 6 HOURS PRN
Status: DISCONTINUED | OUTPATIENT
Start: 2019-08-22 | End: 2019-08-26 | Stop reason: HOSPADM

## 2019-08-22 RX ORDER — FENOFIBRATE 54 MG/1
54 TABLET ORAL DAILY
Status: DISCONTINUED | OUTPATIENT
Start: 2019-08-22 | End: 2019-08-22 | Stop reason: SDUPTHER

## 2019-08-22 RX ORDER — CETIRIZINE HYDROCHLORIDE 10 MG/1
5 TABLET ORAL DAILY
Status: DISCONTINUED | OUTPATIENT
Start: 2019-08-23 | End: 2019-08-26 | Stop reason: HOSPADM

## 2019-08-22 RX ORDER — NICOTINE 21 MG/24HR
1 PATCH, TRANSDERMAL 24 HOURS TRANSDERMAL DAILY
Status: DISCONTINUED | OUTPATIENT
Start: 2019-08-22 | End: 2019-08-26 | Stop reason: HOSPADM

## 2019-08-22 RX ORDER — METOPROLOL TARTRATE 50 MG/1
100 TABLET, FILM COATED ORAL 2 TIMES DAILY
Status: DISCONTINUED | OUTPATIENT
Start: 2019-08-22 | End: 2019-08-23

## 2019-08-22 RX ORDER — ACETAMINOPHEN 325 MG/1
650 TABLET ORAL EVERY 4 HOURS PRN
Status: DISCONTINUED | OUTPATIENT
Start: 2019-08-22 | End: 2019-08-26 | Stop reason: HOSPADM

## 2019-08-22 RX ORDER — HYDROCODONE BITARTRATE AND ACETAMINOPHEN 5; 325 MG/1; MG/1
1 TABLET ORAL 3 TIMES DAILY
Status: DISCONTINUED | OUTPATIENT
Start: 2019-08-22 | End: 2019-08-26 | Stop reason: HOSPADM

## 2019-08-22 RX ORDER — METOPROLOL TARTRATE 50 MG/1
100 TABLET, FILM COATED ORAL ONCE
Status: COMPLETED | OUTPATIENT
Start: 2019-08-22 | End: 2019-08-22

## 2019-08-22 RX ORDER — AMLODIPINE BESYLATE 5 MG/1
10 TABLET ORAL DAILY
Status: DISCONTINUED | OUTPATIENT
Start: 2019-08-23 | End: 2019-08-23

## 2019-08-22 RX ORDER — TRAZODONE HYDROCHLORIDE 50 MG/1
50 TABLET ORAL NIGHTLY PRN
Status: DISCONTINUED | OUTPATIENT
Start: 2019-08-22 | End: 2019-08-26 | Stop reason: HOSPADM

## 2019-08-22 RX ORDER — PANTOPRAZOLE SODIUM 40 MG/1
40 TABLET, DELAYED RELEASE ORAL DAILY
Status: DISCONTINUED | OUTPATIENT
Start: 2019-08-23 | End: 2019-08-26 | Stop reason: HOSPADM

## 2019-08-22 RX ORDER — POTASSIUM CHLORIDE 20 MEQ/1
40 TABLET, EXTENDED RELEASE ORAL ONCE
Status: DISCONTINUED | OUTPATIENT
Start: 2019-08-22 | End: 2019-08-26 | Stop reason: HOSPADM

## 2019-08-22 RX ORDER — GABAPENTIN 400 MG/1
800 CAPSULE ORAL 2 TIMES DAILY
Status: DISCONTINUED | OUTPATIENT
Start: 2019-08-22 | End: 2019-08-26 | Stop reason: HOSPADM

## 2019-08-22 RX ORDER — QUETIAPINE FUMARATE 200 MG/1
800 TABLET, FILM COATED ORAL NIGHTLY
Status: DISCONTINUED | OUTPATIENT
Start: 2019-08-22 | End: 2019-08-26 | Stop reason: HOSPADM

## 2019-08-22 RX ORDER — GABAPENTIN 400 MG/1
800 CAPSULE ORAL 2 TIMES DAILY
Status: DISCONTINUED | OUTPATIENT
Start: 2019-08-22 | End: 2019-08-22 | Stop reason: SDUPTHER

## 2019-08-22 RX ORDER — PANTOPRAZOLE SODIUM 40 MG/1
40 TABLET, DELAYED RELEASE ORAL ONCE
Status: COMPLETED | OUTPATIENT
Start: 2019-08-22 | End: 2019-08-22

## 2019-08-22 RX ORDER — CETIRIZINE HYDROCHLORIDE 10 MG/1
5 TABLET ORAL DAILY
Status: DISCONTINUED | OUTPATIENT
Start: 2019-08-22 | End: 2019-08-22 | Stop reason: SDUPTHER

## 2019-08-22 RX ORDER — FENOFIBRATE 160 MG/1
160 TABLET ORAL DAILY
Status: DISCONTINUED | OUTPATIENT
Start: 2019-08-23 | End: 2019-08-26 | Stop reason: HOSPADM

## 2019-08-22 RX ORDER — MAGNESIUM HYDROXIDE/ALUMINUM HYDROXICE/SIMETHICONE 120; 1200; 1200 MG/30ML; MG/30ML; MG/30ML
30 SUSPENSION ORAL EVERY 6 HOURS PRN
Status: DISCONTINUED | OUTPATIENT
Start: 2019-08-22 | End: 2019-08-26 | Stop reason: HOSPADM

## 2019-08-22 RX ORDER — TAMSULOSIN HYDROCHLORIDE 0.4 MG/1
0.4 CAPSULE ORAL DAILY
Status: DISCONTINUED | OUTPATIENT
Start: 2019-08-22 | End: 2019-08-22 | Stop reason: SDUPTHER

## 2019-08-22 RX ORDER — VENLAFAXINE HYDROCHLORIDE 75 MG/1
150 CAPSULE, EXTENDED RELEASE ORAL ONCE
Status: COMPLETED | OUTPATIENT
Start: 2019-08-22 | End: 2019-08-22

## 2019-08-22 RX ORDER — VENLAFAXINE HYDROCHLORIDE 75 MG/1
150 CAPSULE, EXTENDED RELEASE ORAL
Status: DISCONTINUED | OUTPATIENT
Start: 2019-08-23 | End: 2019-08-23

## 2019-08-22 RX ORDER — CLONAZEPAM 1 MG/1
1 TABLET ORAL PRN
Status: ON HOLD | COMMUNITY
End: 2019-08-26 | Stop reason: HOSPADM

## 2019-08-22 RX ORDER — TAMSULOSIN HYDROCHLORIDE 0.4 MG/1
0.4 CAPSULE ORAL DAILY
Status: DISCONTINUED | OUTPATIENT
Start: 2019-08-23 | End: 2019-08-26 | Stop reason: HOSPADM

## 2019-08-22 RX ADMIN — IOPAMIDOL 75 ML: 612 INJECTION, SOLUTION INTRAVENOUS at 08:27

## 2019-08-22 RX ADMIN — AMLODIPINE BESYLATE 10 MG: 5 TABLET ORAL at 11:08

## 2019-08-22 RX ADMIN — HYDROCODONE BITARTRATE AND ACETAMINOPHEN 1 TABLET: 5; 325 TABLET ORAL at 15:00

## 2019-08-22 RX ADMIN — METOPROLOL TARTRATE 100 MG: 50 TABLET ORAL at 20:05

## 2019-08-22 RX ADMIN — GABAPENTIN 800 MG: 400 CAPSULE ORAL at 20:05

## 2019-08-22 RX ADMIN — TAMSULOSIN HYDROCHLORIDE 0.4 MG: 0.4 CAPSULE ORAL at 11:08

## 2019-08-22 RX ADMIN — METOPROLOL TARTRATE 100 MG: 50 TABLET ORAL at 11:08

## 2019-08-22 RX ADMIN — CETIRIZINE HYDROCHLORIDE 5 MG: 10 TABLET ORAL at 11:09

## 2019-08-22 RX ADMIN — HYDROCODONE BITARTRATE AND ACETAMINOPHEN 1 TABLET: 5; 325 TABLET ORAL at 20:06

## 2019-08-22 RX ADMIN — QUETIAPINE FUMARATE 800 MG: 200 TABLET ORAL at 20:05

## 2019-08-22 RX ADMIN — HYDROCODONE BITARTRATE AND ACETAMINOPHEN 1 TABLET: 5; 325 TABLET ORAL at 11:08

## 2019-08-22 RX ADMIN — PANTOPRAZOLE SODIUM 40 MG: 40 TABLET, DELAYED RELEASE ORAL at 11:08

## 2019-08-22 RX ADMIN — FENOFIBRATE 54 MG: 54 TABLET ORAL at 11:11

## 2019-08-22 RX ADMIN — VENLAFAXINE HYDROCHLORIDE 150 MG: 75 CAPSULE, EXTENDED RELEASE ORAL at 11:08

## 2019-08-22 RX ADMIN — GABAPENTIN 800 MG: 400 CAPSULE ORAL at 11:08

## 2019-08-22 RX ADMIN — ALUMINUM HYDROXIDE, MAGNESIUM HYDROXIDE, AND SIMETHICONE 30 ML: 200; 200; 20 SUSPENSION ORAL at 20:06

## 2019-08-22 ASSESSMENT — LIFESTYLE VARIABLES: HISTORY_ALCOHOL_USE: YES

## 2019-08-22 ASSESSMENT — PAIN DESCRIPTION - ORIENTATION: ORIENTATION: LEFT

## 2019-08-22 ASSESSMENT — PAIN SCALES - GENERAL
PAINLEVEL_OUTOF10: 5
PAINLEVEL_OUTOF10: 7
PAINLEVEL_OUTOF10: 5
PAINLEVEL_OUTOF10: 10

## 2019-08-22 ASSESSMENT — PAIN DESCRIPTION - PAIN TYPE: TYPE: ACUTE PAIN

## 2019-08-22 ASSESSMENT — PAIN DESCRIPTION - DESCRIPTORS: DESCRIPTORS: SHOOTING

## 2019-08-22 ASSESSMENT — PAIN DESCRIPTION - LOCATION: LOCATION: FLANK

## 2019-08-22 NOTE — GROUP NOTE
Group Therapy Note    Date: August 22    Group Start Time: 1430  Group End Time: 3255  Group Topic: [address] [facility]        Group Therapy Note    Attendees: 7         Patient's Goal:  Patient will discuss coping skills and engage in meditation to practice coping skills. Notes:  Patient attended group. Discussed coping skills and engaged in meditation. Reported positive benefits from the exercises. Status After Intervention:  Improved    Participation Level:  Active Listener    Participation Quality: Appropriate and Attentive      Speech:  normal      Thought Process/Content: Logical      Affective Functioning: Congruent      Mood: anxious and depressed      Level of consciousness:  Oriented x4      Response to Learning: Able to verbalize current knowledge/experience      Endings: None Reported    Modes of Intervention: Education, Socialization and Exploration      Discipline Responsible: /Counselor      Signature:  Glena Apley, [facility]

## 2019-08-22 NOTE — ED TRIAGE NOTES
Chief Complaint   Patient presents with    Flank Pain     pt presents via EMS c/o left flank pain x \"5-6 years\", pt states \"they told me I have a mass in my stomach\", pt also c/o pain with urination

## 2019-08-22 NOTE — ED NOTES
Pt getting changed into safety gown, will be transferred to [facility].       Fabiola Richardson RN  08/22/19 7079

## 2019-08-22 NOTE — H&P
Hospital Medicine History & Physical      PCP: Noelle Garibay, APRN - CNP    Date of Admission: 8/22/2019    Date of Service: Pt seen/examined on 8/22/2019     Chief Complaint:    Chief Complaint   Patient presents with    Flank Pain     pt presents via EMS c/o left flank pain x \"5-6 years\", pt states \"they told me I have a mass in my stomach\", pt also c/o pain with urination         History Of Present Illness: The patient is a 62 y.o. male with diabetes mellitus type 2, hypertension, GERD, chronic pain, chronic kidney disease who presented to [facility] ED for flank pain. He was subsequently admitted to [facility] for acute psychiatric decompensation. patient was seen and evaluated in the ED by the ED medical provider, patient was medically cleared for admission to [facility] at [facility]. This note serves as an admission medical H&P. Tobacco use: Yes  ETOH use: Rarely  Illicit drug use: Marijuana    Patient denies any medical complaints. He is requesting that he restart his Neurontin, Klonopin, pain pills    Past Medical History:        Diagnosis Date    Anxiety     Arthritis     Clostridium difficile diarrhea 1/10/15    Depression     Difficulty urinating     DM [facility]diabetes mellitus) [address] [address]     Hematoma     pt unsure where it was    Hx of blood clots     pt unsure where    Hyperlipidemia     Hypertension     Pneumonia     Psychosis ([facility] [address]        Past Surgical History:        Procedure Laterality Date    COLONOSCOPY      COLONOSCOPY  11/30/2016    colon polyp    TONSILLECTOMY         Medications Prior to Admission:    Prior to Admission medications    Medication Sig Start Date End Date Taking? Authorizing Provider   clonazePAM (KLONOPIN) 1 MG tablet Take 1 mg by mouth as needed.    Yes Historical Provider, MD   venlafaxine (EFFEXOR XR) 150 MG extended release capsule Take 1 capsule by mouth daily (with breakfast) 8/6/19   Antonia Lopez MD   metFORMIN (GLUCOPHAGE) 850 MG tablet Take 1 tablet LEUKOCYTESUR Negative 08/22/2019    BLOODU Negative 08/22/2019    GLUCOSEU Negative 08/22/2019    GLUCOSEU NEGATIVE 01/05/2012    AMORPHOUS 4+ 01/08/2015       ASSESSMENT/PLAN:  #Psychosis  - per psychiatry team    #Hypokalemia  - Replace     #CKD 3  - crt stable    #Anemia  - chronic, needs further work-up as outpatient    #DM2  -Metformin continued    #Hypertension  -Norvasc, Lopressor continued    #GERD  -Protonix continued    #Marijuana abuse  #Tobacco dependence  -Recommend cessation    Virgilio Cook PA-C  8/22/2019 3:20 PM
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
## 2019-08-23 PROBLEM — F25.0 SCHIZOAFFECTIVE DISORDER, BIPOLAR TYPE (HCC): Status: ACTIVE | Noted: 2019-08-23

## 2019-08-23 LAB
ANION GAP SERPL CALCULATED.3IONS-SCNC: 8 MMOL/L (ref 3–16)
BASOPHILS ABSOLUTE: 0.1 K/UL (ref 0–0.2)
BASOPHILS RELATIVE PERCENT: 1.3 %
BUN BLDV-MCNC: 19 MG/DL (ref 7–20)
CALCIUM SERPL-MCNC: 9.5 MG/DL (ref 8.3–10.6)
CHLORIDE BLD-SCNC: 102 MMOL/L (ref 99–110)
CO2: 27 MMOL/L (ref 21–32)
CREAT SERPL-MCNC: 1.9 MG/DL (ref 0.9–1.3)
EOSINOPHILS ABSOLUTE: 0.4 K/UL (ref 0–0.6)
EOSINOPHILS RELATIVE PERCENT: 9 %
ESTIMATED AVERAGE GLUCOSE: 96.8 MG/DL
GFR AFRICAN AMERICAN: 45
GFR NON-AFRICAN AMERICAN: 37
GLUCOSE BLD-MCNC: 124 MG/DL (ref 70–99)
HBA1C MFR BLD: 5 %
HCT VFR BLD CALC: 29.6 % (ref 40.5–52.5)
HEMOGLOBIN: 10.1 G/DL (ref 13.5–17.5)
LYMPHOCYTES ABSOLUTE: 1.9 K/UL (ref 1–5.1)
LYMPHOCYTES RELATIVE PERCENT: 43.4 %
MCH RBC QN AUTO: 32.7 PG (ref 26–34)
MCHC RBC AUTO-ENTMCNC: 34.1 G/DL (ref 31–36)
MCV RBC AUTO: 96 FL (ref 80–100)
MONOCYTES ABSOLUTE: 0.4 K/UL (ref 0–1.3)
MONOCYTES RELATIVE PERCENT: 8.7 %
NEUTROPHILS ABSOLUTE: 1.6 K/UL (ref 1.7–7.7)
NEUTROPHILS RELATIVE PERCENT: 37.6 %
PDW BLD-RTO: 15.2 % (ref 12.4–15.4)
PLATELET # BLD: 321 K/UL (ref 135–450)
PMV BLD AUTO: 7.9 FL (ref 5–10.5)
POTASSIUM SERPL-SCNC: 4.3 MMOL/L (ref 3.5–5.1)
RBC # BLD: 3.08 M/UL (ref 4.2–5.9)
SODIUM BLD-SCNC: 137 MMOL/L (ref 136–145)
WBC # BLD: 4.3 K/UL (ref 4–11)

## 2019-08-23 PROCEDURE — 1240000000 HC EMOTIONAL WELLNESS R&B

## 2019-08-23 PROCEDURE — 36415 COLL VENOUS BLD VENIPUNCTURE: CPT

## 2019-08-23 PROCEDURE — 80048 BASIC METABOLIC PNL TOTAL CA: CPT

## 2019-08-23 PROCEDURE — 85025 COMPLETE CBC W/AUTO DIFF WBC: CPT

## 2019-08-23 PROCEDURE — 99223 1ST HOSP IP/OBS HIGH 75: CPT | Performed by: PSYCHIATRY & NEUROLOGY

## 2019-08-23 PROCEDURE — 6370000000 HC RX 637 (ALT 250 FOR IP): Performed by: PSYCHIATRY & NEUROLOGY

## 2019-08-23 PROCEDURE — 6370000000 HC RX 637 (ALT 250 FOR IP): Performed by: EMERGENCY MEDICINE

## 2019-08-23 PROCEDURE — 6370000000 HC RX 637 (ALT 250 FOR IP): Performed by: NURSE PRACTITIONER

## 2019-08-23 PROCEDURE — 83036 HEMOGLOBIN GLYCOSYLATED A1C: CPT

## 2019-08-23 RX ORDER — HALOPERIDOL 5 MG
5 TABLET ORAL EVERY 6 HOURS PRN
Status: DISCONTINUED | OUTPATIENT
Start: 2019-08-23 | End: 2019-08-26 | Stop reason: HOSPADM

## 2019-08-23 RX ORDER — AMLODIPINE BESYLATE 5 MG/1
5 TABLET ORAL DAILY
Status: DISCONTINUED | OUTPATIENT
Start: 2019-08-24 | End: 2019-08-25

## 2019-08-23 RX ORDER — RISPERIDONE 1 MG/1
1 TABLET, FILM COATED ORAL 2 TIMES DAILY
Status: DISCONTINUED | OUTPATIENT
Start: 2019-08-23 | End: 2019-08-26

## 2019-08-23 RX ORDER — HALOPERIDOL 5 MG/ML
5 INJECTION INTRAMUSCULAR EVERY 6 HOURS PRN
Status: DISCONTINUED | OUTPATIENT
Start: 2019-08-23 | End: 2019-08-26 | Stop reason: HOSPADM

## 2019-08-23 RX ORDER — VENLAFAXINE HYDROCHLORIDE 75 MG/1
75 CAPSULE, EXTENDED RELEASE ORAL
Status: DISCONTINUED | OUTPATIENT
Start: 2019-08-24 | End: 2019-08-26 | Stop reason: HOSPADM

## 2019-08-23 RX ORDER — LORAZEPAM 2 MG/1
2 TABLET ORAL EVERY 6 HOURS PRN
Status: DISCONTINUED | OUTPATIENT
Start: 2019-08-23 | End: 2019-08-26 | Stop reason: HOSPADM

## 2019-08-23 RX ORDER — HALOPERIDOL 5 MG/ML
10 INJECTION INTRAMUSCULAR EVERY 6 HOURS PRN
Status: DISCONTINUED | OUTPATIENT
Start: 2019-08-23 | End: 2019-08-23

## 2019-08-23 RX ORDER — HALOPERIDOL 10 MG/1
10 TABLET ORAL EVERY 6 HOURS PRN
Status: DISCONTINUED | OUTPATIENT
Start: 2019-08-23 | End: 2019-08-23

## 2019-08-23 RX ORDER — HALOPERIDOL 5 MG
5 TABLET ORAL ONCE
Status: COMPLETED | OUTPATIENT
Start: 2019-08-23 | End: 2019-08-23

## 2019-08-23 RX ORDER — LORAZEPAM 2 MG/ML
2 INJECTION INTRAMUSCULAR EVERY 6 HOURS PRN
Status: DISCONTINUED | OUTPATIENT
Start: 2019-08-23 | End: 2019-08-26 | Stop reason: HOSPADM

## 2019-08-23 RX ORDER — METOPROLOL TARTRATE 50 MG/1
50 TABLET, FILM COATED ORAL 2 TIMES DAILY
Status: DISCONTINUED | OUTPATIENT
Start: 2019-08-23 | End: 2019-08-24

## 2019-08-23 RX ORDER — LORAZEPAM 2 MG/1
2 TABLET ORAL ONCE
Status: COMPLETED | OUTPATIENT
Start: 2019-08-23 | End: 2019-08-23

## 2019-08-23 RX ADMIN — NICOTINE POLACRILEX 4 MG: 4 GUM, CHEWING BUCCAL at 18:27

## 2019-08-23 RX ADMIN — AMLODIPINE BESYLATE 10 MG: 5 TABLET ORAL at 10:41

## 2019-08-23 RX ADMIN — PANTOPRAZOLE SODIUM 40 MG: 40 TABLET, DELAYED RELEASE ORAL at 06:01

## 2019-08-23 RX ADMIN — HYDROCODONE BITARTRATE AND ACETAMINOPHEN 1 TABLET: 5; 325 TABLET ORAL at 20:25

## 2019-08-23 RX ADMIN — METOPROLOL TARTRATE 100 MG: 50 TABLET ORAL at 10:40

## 2019-08-23 RX ADMIN — GABAPENTIN 800 MG: 400 CAPSULE ORAL at 10:40

## 2019-08-23 RX ADMIN — LORAZEPAM 2 MG: 2 TABLET ORAL at 07:25

## 2019-08-23 RX ADMIN — IBUPROFEN 400 MG: 400 TABLET, FILM COATED ORAL at 18:21

## 2019-08-23 RX ADMIN — GABAPENTIN 800 MG: 400 CAPSULE ORAL at 20:25

## 2019-08-23 RX ADMIN — QUETIAPINE FUMARATE 800 MG: 200 TABLET ORAL at 20:26

## 2019-08-23 RX ADMIN — VENLAFAXINE HYDROCHLORIDE 150 MG: 75 CAPSULE, EXTENDED RELEASE ORAL at 10:39

## 2019-08-23 RX ADMIN — HYDROCODONE BITARTRATE AND ACETAMINOPHEN 1 TABLET: 5; 325 TABLET ORAL at 15:19

## 2019-08-23 RX ADMIN — HYDROCODONE BITARTRATE AND ACETAMINOPHEN 1 TABLET: 5; 325 TABLET ORAL at 10:40

## 2019-08-23 RX ADMIN — FENOFIBRATE 160 MG: 160 TABLET ORAL at 10:40

## 2019-08-23 RX ADMIN — CETIRIZINE HYDROCHLORIDE 5 MG: 10 TABLET ORAL at 10:39

## 2019-08-23 RX ADMIN — HALOPERIDOL 5 MG: 5 TABLET ORAL at 07:26

## 2019-08-23 RX ADMIN — RISPERIDONE 1 MG: 1 TABLET ORAL at 20:25

## 2019-08-23 RX ADMIN — TAMSULOSIN HYDROCHLORIDE 0.4 MG: 0.4 CAPSULE ORAL at 10:40

## 2019-08-23 ASSESSMENT — PAIN DESCRIPTION - ONSET: ONSET: ON-GOING

## 2019-08-23 ASSESSMENT — PAIN SCALES - GENERAL
PAINLEVEL_OUTOF10: 8
PAINLEVEL_OUTOF10: 8
PAINLEVEL_OUTOF10: 7
PAINLEVEL_OUTOF10: 10
PAINLEVEL_OUTOF10: 10
PAINLEVEL_OUTOF10: 0

## 2019-08-23 ASSESSMENT — PAIN DESCRIPTION - LOCATION: LOCATION: BACK

## 2019-08-23 ASSESSMENT — PAIN DESCRIPTION - DESCRIPTORS: DESCRIPTORS: SHOOTING

## 2019-08-23 ASSESSMENT — PAIN DESCRIPTION - PAIN TYPE
TYPE: CHRONIC PAIN
TYPE: CHRONIC PAIN

## 2019-08-23 ASSESSMENT — PAIN DESCRIPTION - FREQUENCY: FREQUENCY: CONTINUOUS

## 2019-08-23 ASSESSMENT — PAIN DESCRIPTION - PROGRESSION: CLINICAL_PROGRESSION: GRADUALLY IMPROVING

## 2019-08-23 ASSESSMENT — PAIN - FUNCTIONAL ASSESSMENT: PAIN_FUNCTIONAL_ASSESSMENT: PREVENTS OR INTERFERES SOME ACTIVE ACTIVITIES AND ADLS

## 2019-08-23 ASSESSMENT — PAIN DESCRIPTION - ORIENTATION: ORIENTATION: LOWER

## 2019-08-24 PROCEDURE — 6370000000 HC RX 637 (ALT 250 FOR IP): Performed by: NURSE PRACTITIONER

## 2019-08-24 PROCEDURE — 6370000000 HC RX 637 (ALT 250 FOR IP): Performed by: PSYCHIATRY & NEUROLOGY

## 2019-08-24 PROCEDURE — 6370000000 HC RX 637 (ALT 250 FOR IP): Performed by: EMERGENCY MEDICINE

## 2019-08-24 PROCEDURE — 99232 SBSQ HOSP IP/OBS MODERATE 35: CPT | Performed by: PHYSICIAN ASSISTANT

## 2019-08-24 PROCEDURE — 99233 SBSQ HOSP IP/OBS HIGH 50: CPT | Performed by: PSYCHIATRY & NEUROLOGY

## 2019-08-24 PROCEDURE — 1240000000 HC EMOTIONAL WELLNESS R&B

## 2019-08-24 PROCEDURE — 6370000000 HC RX 637 (ALT 250 FOR IP): Performed by: PHYSICIAN ASSISTANT

## 2019-08-24 RX ADMIN — CETIRIZINE HYDROCHLORIDE 5 MG: 10 TABLET ORAL at 08:15

## 2019-08-24 RX ADMIN — HYDROCODONE BITARTRATE AND ACETAMINOPHEN 1 TABLET: 5; 325 TABLET ORAL at 13:56

## 2019-08-24 RX ADMIN — RISPERIDONE 1 MG: 1 TABLET ORAL at 21:39

## 2019-08-24 RX ADMIN — HALOPERIDOL 5 MG: 5 TABLET ORAL at 11:43

## 2019-08-24 RX ADMIN — IBUPROFEN 400 MG: 400 TABLET, FILM COATED ORAL at 10:37

## 2019-08-24 RX ADMIN — HYDROCODONE BITARTRATE AND ACETAMINOPHEN 1 TABLET: 5; 325 TABLET ORAL at 21:39

## 2019-08-24 RX ADMIN — TAMSULOSIN HYDROCHLORIDE 0.4 MG: 0.4 CAPSULE ORAL at 08:15

## 2019-08-24 RX ADMIN — QUETIAPINE FUMARATE 800 MG: 200 TABLET ORAL at 21:39

## 2019-08-24 RX ADMIN — VENLAFAXINE HYDROCHLORIDE 75 MG: 75 CAPSULE, EXTENDED RELEASE ORAL at 08:16

## 2019-08-24 RX ADMIN — METOPROLOL TARTRATE 50 MG: 50 TABLET ORAL at 08:15

## 2019-08-24 RX ADMIN — LORAZEPAM 2 MG: 2 TABLET ORAL at 11:43

## 2019-08-24 RX ADMIN — FENOFIBRATE 160 MG: 160 TABLET ORAL at 08:15

## 2019-08-24 RX ADMIN — GABAPENTIN 800 MG: 400 CAPSULE ORAL at 21:39

## 2019-08-24 RX ADMIN — GABAPENTIN 800 MG: 400 CAPSULE ORAL at 08:14

## 2019-08-24 RX ADMIN — PANTOPRAZOLE SODIUM 40 MG: 40 TABLET, DELAYED RELEASE ORAL at 08:14

## 2019-08-24 RX ADMIN — HYDROCODONE BITARTRATE AND ACETAMINOPHEN 1 TABLET: 5; 325 TABLET ORAL at 08:14

## 2019-08-24 RX ADMIN — RISPERIDONE 1 MG: 1 TABLET ORAL at 08:16

## 2019-08-24 ASSESSMENT — PAIN SCALES - GENERAL
PAINLEVEL_OUTOF10: 7
PAINLEVEL_OUTOF10: 10
PAINLEVEL_OUTOF10: 7
PAINLEVEL_OUTOF10: 6
PAINLEVEL_OUTOF10: 6

## 2019-08-24 ASSESSMENT — PAIN DESCRIPTION - PAIN TYPE: TYPE: CHRONIC PAIN

## 2019-08-24 NOTE — PROGRESS NOTES
Patient up to station requesting medication to help with his anxiety. He reports that he feels like he is going to explode and needs something or he thinks he is going to go off. He then went to say they meaning the MD is not giving him what he needs which is Klonopin. When he was informed that he could have Ativan and Haldol he felt that would be even better. He took the medications willingly and returned to coloring at the table.

## 2019-08-25 LAB
ANION GAP SERPL CALCULATED.3IONS-SCNC: 6 MMOL/L (ref 3–16)
BUN BLDV-MCNC: 22 MG/DL (ref 7–20)
CALCIUM SERPL-MCNC: 9.4 MG/DL (ref 8.3–10.6)
CHLORIDE BLD-SCNC: 101 MMOL/L (ref 99–110)
CO2: 27 MMOL/L (ref 21–32)
CREAT SERPL-MCNC: 2 MG/DL (ref 0.9–1.3)
GFR AFRICAN AMERICAN: 42
GFR NON-AFRICAN AMERICAN: 35
GLUCOSE BLD-MCNC: 120 MG/DL (ref 70–99)
POTASSIUM SERPL-SCNC: 4.7 MMOL/L (ref 3.5–5.1)
SODIUM BLD-SCNC: 134 MMOL/L (ref 136–145)

## 2019-08-25 PROCEDURE — 80048 BASIC METABOLIC PNL TOTAL CA: CPT

## 2019-08-25 PROCEDURE — 1240000000 HC EMOTIONAL WELLNESS R&B

## 2019-08-25 PROCEDURE — 6370000000 HC RX 637 (ALT 250 FOR IP): Performed by: EMERGENCY MEDICINE

## 2019-08-25 PROCEDURE — 6370000000 HC RX 637 (ALT 250 FOR IP): Performed by: PSYCHIATRY & NEUROLOGY

## 2019-08-25 PROCEDURE — 6370000000 HC RX 637 (ALT 250 FOR IP): Performed by: NURSE PRACTITIONER

## 2019-08-25 PROCEDURE — 36415 COLL VENOUS BLD VENIPUNCTURE: CPT

## 2019-08-25 RX ADMIN — RISPERIDONE 1 MG: 1 TABLET ORAL at 21:41

## 2019-08-25 RX ADMIN — CETIRIZINE HYDROCHLORIDE 5 MG: 10 TABLET ORAL at 08:37

## 2019-08-25 RX ADMIN — PANTOPRAZOLE SODIUM 40 MG: 40 TABLET, DELAYED RELEASE ORAL at 06:04

## 2019-08-25 RX ADMIN — LORAZEPAM 2 MG: 2 TABLET ORAL at 08:46

## 2019-08-25 RX ADMIN — HYDROCODONE BITARTRATE AND ACETAMINOPHEN 1 TABLET: 5; 325 TABLET ORAL at 08:37

## 2019-08-25 RX ADMIN — TAMSULOSIN HYDROCHLORIDE 0.4 MG: 0.4 CAPSULE ORAL at 08:37

## 2019-08-25 RX ADMIN — GABAPENTIN 800 MG: 400 CAPSULE ORAL at 21:39

## 2019-08-25 RX ADMIN — IBUPROFEN 400 MG: 400 TABLET, FILM COATED ORAL at 18:06

## 2019-08-25 RX ADMIN — HALOPERIDOL 5 MG: 5 TABLET ORAL at 13:58

## 2019-08-25 RX ADMIN — VENLAFAXINE HYDROCHLORIDE 75 MG: 75 CAPSULE, EXTENDED RELEASE ORAL at 08:37

## 2019-08-25 RX ADMIN — LORAZEPAM 2 MG: 2 TABLET ORAL at 15:38

## 2019-08-25 RX ADMIN — RISPERIDONE 1 MG: 1 TABLET ORAL at 08:37

## 2019-08-25 RX ADMIN — HYDROCODONE BITARTRATE AND ACETAMINOPHEN 1 TABLET: 5; 325 TABLET ORAL at 13:58

## 2019-08-25 RX ADMIN — HYDROCODONE BITARTRATE AND ACETAMINOPHEN 1 TABLET: 5; 325 TABLET ORAL at 21:40

## 2019-08-25 RX ADMIN — FENOFIBRATE 160 MG: 160 TABLET ORAL at 08:37

## 2019-08-25 RX ADMIN — QUETIAPINE FUMARATE 800 MG: 200 TABLET ORAL at 21:39

## 2019-08-25 RX ADMIN — GABAPENTIN 800 MG: 400 CAPSULE ORAL at 08:37

## 2019-08-25 ASSESSMENT — PAIN SCALES - GENERAL
PAINLEVEL_OUTOF10: 7
PAINLEVEL_OUTOF10: 6
PAINLEVEL_OUTOF10: 7
PAINLEVEL_OUTOF10: 8

## 2019-08-25 NOTE — PLAN OF CARE
Patient rates Depression 0/10 and Anxiety 7/10. Patient denies SI/HI/A/V/H. Patient visible on the milieu, and eat his dinner. Patient attended and participated in wrap up group. Patient took HS medication. No outbursts noted. Patient pleasant and cooperative with writer. No c/o's voiced at present.

## 2019-08-26 VITALS
OXYGEN SATURATION: 94 % | RESPIRATION RATE: 16 BRPM | HEIGHT: 66 IN | TEMPERATURE: 97.5 F | WEIGHT: 120 LBS | SYSTOLIC BLOOD PRESSURE: 137 MMHG | HEART RATE: 129 BPM | DIASTOLIC BLOOD PRESSURE: 75 MMHG | BODY MASS INDEX: 19.29 KG/M2

## 2019-08-26 PROCEDURE — 6370000000 HC RX 637 (ALT 250 FOR IP): Performed by: PSYCHIATRY & NEUROLOGY

## 2019-08-26 PROCEDURE — 99231 SBSQ HOSP IP/OBS SF/LOW 25: CPT | Performed by: PHYSICIAN ASSISTANT

## 2019-08-26 PROCEDURE — 99239 HOSP IP/OBS DSCHRG MGMT >30: CPT | Performed by: PSYCHIATRY & NEUROLOGY

## 2019-08-26 PROCEDURE — 6370000000 HC RX 637 (ALT 250 FOR IP): Performed by: EMERGENCY MEDICINE

## 2019-08-26 PROCEDURE — 6370000000 HC RX 637 (ALT 250 FOR IP): Performed by: NURSE PRACTITIONER

## 2019-08-26 RX ORDER — RISPERIDONE 2 MG/1
2 TABLET, FILM COATED ORAL NIGHTLY
Qty: 30 TABLET | Refills: 0 | Status: ON HOLD | OUTPATIENT
Start: 2019-08-27 | End: 2020-03-11 | Stop reason: HOSPADM

## 2019-08-26 RX ORDER — VENLAFAXINE HYDROCHLORIDE 75 MG/1
75 CAPSULE, EXTENDED RELEASE ORAL
Qty: 30 CAPSULE | Refills: 0 | Status: ON HOLD | OUTPATIENT
Start: 2019-08-27 | End: 2020-03-11 | Stop reason: HOSPADM

## 2019-08-26 RX ORDER — PANTOPRAZOLE SODIUM 40 MG/1
40 TABLET, DELAYED RELEASE ORAL DAILY
Qty: 30 TABLET | Refills: 3 | Status: CANCELLED | OUTPATIENT
Start: 2019-08-27

## 2019-08-26 RX ORDER — TAMSULOSIN HYDROCHLORIDE 0.4 MG/1
0.4 CAPSULE ORAL DAILY
Qty: 30 CAPSULE | Refills: 3 | Status: CANCELLED | OUTPATIENT
Start: 2019-08-27

## 2019-08-26 RX ORDER — RISPERIDONE 2 MG/1
2 TABLET, FILM COATED ORAL NIGHTLY
Qty: 30 TABLET | Refills: 0 | Status: CANCELLED | OUTPATIENT
Start: 2019-08-27

## 2019-08-26 RX ORDER — TAMSULOSIN HYDROCHLORIDE 0.4 MG/1
0.4 CAPSULE ORAL DAILY
Qty: 30 CAPSULE | Refills: 3 | Status: ON HOLD | OUTPATIENT
Start: 2019-08-27 | End: 2020-03-11 | Stop reason: HOSPADM

## 2019-08-26 RX ORDER — FENOFIBRATE 160 MG/1
160 TABLET ORAL DAILY
Qty: 30 TABLET | Refills: 0 | Status: ON HOLD | OUTPATIENT
Start: 2019-08-27 | End: 2020-03-11 | Stop reason: HOSPADM

## 2019-08-26 RX ORDER — CETIRIZINE HYDROCHLORIDE 5 MG/1
5 TABLET ORAL DAILY
Qty: 30 TABLET | Refills: 0 | Status: ON HOLD | OUTPATIENT
Start: 2019-08-27 | End: 2020-03-11 | Stop reason: HOSPADM

## 2019-08-26 RX ORDER — RISPERIDONE 2 MG/1
2 TABLET, FILM COATED ORAL NIGHTLY
Status: DISCONTINUED | OUTPATIENT
Start: 2019-08-27 | End: 2019-08-26 | Stop reason: HOSPADM

## 2019-08-26 RX ORDER — PANTOPRAZOLE SODIUM 40 MG/1
40 TABLET, DELAYED RELEASE ORAL DAILY
Qty: 30 TABLET | Refills: 0 | Status: ON HOLD | OUTPATIENT
Start: 2019-08-27 | End: 2020-03-11 | Stop reason: HOSPADM

## 2019-08-26 RX ORDER — GABAPENTIN 400 MG/1
800 CAPSULE ORAL 2 TIMES DAILY
Qty: 120 CAPSULE | Refills: 0 | Status: ON HOLD | OUTPATIENT
Start: 2019-08-26 | End: 2020-03-11 | Stop reason: HOSPADM

## 2019-08-26 RX ADMIN — TAMSULOSIN HYDROCHLORIDE 0.4 MG: 0.4 CAPSULE ORAL at 08:54

## 2019-08-26 RX ADMIN — HYDROCODONE BITARTRATE AND ACETAMINOPHEN 1 TABLET: 5; 325 TABLET ORAL at 08:53

## 2019-08-26 RX ADMIN — PANTOPRAZOLE SODIUM 40 MG: 40 TABLET, DELAYED RELEASE ORAL at 05:52

## 2019-08-26 RX ADMIN — FENOFIBRATE 160 MG: 160 TABLET ORAL at 08:54

## 2019-08-26 RX ADMIN — CETIRIZINE HYDROCHLORIDE 5 MG: 10 TABLET ORAL at 08:52

## 2019-08-26 RX ADMIN — GABAPENTIN 800 MG: 400 CAPSULE ORAL at 08:53

## 2019-08-26 RX ADMIN — VENLAFAXINE HYDROCHLORIDE 75 MG: 75 CAPSULE, EXTENDED RELEASE ORAL at 08:53

## 2019-08-26 RX ADMIN — RISPERIDONE 1 MG: 1 TABLET ORAL at 08:54

## 2019-08-26 ASSESSMENT — PAIN SCALES - GENERAL: PAINLEVEL_OUTOF10: 10

## 2019-08-26 NOTE — PLAN OF CARE
Patient rates Depression 8/10 and Anxiety 10/10. Patient denies SI/HI/A/V/H. Patient visible and social on the milieu. Patient attended and participated in wrap up group. Patient pleasant and cooperative. Patient took HS medications. No outbursts noted. No c/o's voiced at present.
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
## 2019-08-28 ENCOUNTER — HOSPITAL ENCOUNTER (EMERGENCY)
Age: 54
Discharge: HOME | End: 2019-08-28
Payer: MEDICARE

## 2019-08-28 ENCOUNTER — APPOINTMENT (OUTPATIENT)
Dept: GENERAL RADIOLOGY | Age: 54
End: 2019-08-28
Payer: MEDICARE

## 2019-08-28 VITALS
SYSTOLIC BLOOD PRESSURE: 151 MMHG | RESPIRATION RATE: 16 BRPM | TEMPERATURE: 98.1 F | OXYGEN SATURATION: 100 % | DIASTOLIC BLOOD PRESSURE: 88 MMHG | HEART RATE: 91 BPM

## 2019-08-28 DIAGNOSIS — S61.511A TEAR OF SKIN OF RIGHT WRIST, INITIAL ENCOUNTER: Primary | ICD-10-CM

## 2019-08-28 DIAGNOSIS — Z23 TETANUS TOXOID VACCINATION ADMINISTERED AT CURRENT VISIT: ICD-10-CM

## 2019-08-28 PROCEDURE — 90715 TDAP VACCINE 7 YRS/> IM: CPT | Performed by: NURSE PRACTITIONER

## 2019-08-28 PROCEDURE — 6360000002 HC RX W HCPCS: Performed by: NURSE PRACTITIONER

## 2019-08-28 PROCEDURE — 90471 IMMUNIZATION ADMIN: CPT | Performed by: NURSE PRACTITIONER

## 2019-08-28 PROCEDURE — 73100 X-RAY EXAM OF WRIST: CPT

## 2019-08-28 PROCEDURE — 99282 EMERGENCY DEPT VISIT SF MDM: CPT

## 2019-08-28 RX ADMIN — TETANUS TOXOID, REDUCED DIPHTHERIA TOXOID AND ACELLULAR PERTUSSIS VACCINE, ADSORBED 0.5 ML: 5; 2.5; 8; 8; 2.5 SUSPENSION INTRAMUSCULAR at 22:09

## 2019-08-29 NOTE — ED PROVIDER NOTES
CHIEF COMPLAINT  Other (pt brought in by police for medical clearance. Pt needs right wrist checked)      HISTORY OF PRESENT ILLNESS  Dasia Isaacs is a 62 y.o. male who presents to the ED complaining of right wrist injury. Patient is non toxic in appearance and in no acute distress. Patient presents to the emergency department via police. Patient reports he sustained injury to this right wrist just prior to arrival to the ER. Per officer at the bedside, patient was resisting arrest and sustained laceration to right wrist per hand cuff. Patient reports discomfort to right wrist however, he does not offer number regarding pain scale. Denies exacerbating or relieving factors. Denies radiation of pain. Denies numbness, tingling or weakness of the extremity. Denies being UTD on tetanus. Patient initially appeared agitated upon my arrival to the bedside, hand cuff removed from right wrist per officer at the bedside and patient did report relief and agitation resolved. No other complaints, modifying factors or associated symptoms. Nursing notes reviewed.    Past Medical History:   Diagnosis Date    Anxiety     Arthritis     Clostridium difficile diarrhea 1/10/15    Depression     Difficulty urinating     DM (diabetes mellitus) ([address]     Hematoma     pt unsure where it was    Hx of blood clots     pt unsure where    Hyperlipidemia     Hypertension     Pneumonia     Psychosis ([address]      Past Surgical History:   Procedure Laterality Date    COLONOSCOPY      COLONOSCOPY  11/30/2016    colon polyp    TONSILLECTOMY       Family History   Problem Relation Age of Onset    Heart Disease Mother     Mental Illness Mother     Cancer Father     Heart Disease Brother     Mental Illness Brother      Social History     Socioeconomic History    Marital status:      Spouse name: Not on file    Number of children: 1    Years of education: 6    Highest education level: Not on file

## 2020-01-06 ENCOUNTER — HOSPITAL ENCOUNTER (EMERGENCY)
Age: 55
Discharge: HOME | End: 2020-01-06
Payer: MEDICAID

## 2020-01-06 VITALS
HEART RATE: 130 BPM | TEMPERATURE: 97.4 F | RESPIRATION RATE: 18 BRPM | WEIGHT: 120 LBS | OXYGEN SATURATION: 100 % | BODY MASS INDEX: 18.19 KG/M2 | HEIGHT: 68 IN

## 2020-01-06 PROCEDURE — 4500000021 HC ED LEVEL 1 PROCEDURE

## 2020-01-06 PROCEDURE — 99282 EMERGENCY DEPT VISIT SF MDM: CPT

## 2020-01-06 PROCEDURE — 90715 TDAP VACCINE 7 YRS/> IM: CPT | Performed by: NURSE PRACTITIONER

## 2020-01-06 PROCEDURE — 6360000002 HC RX W HCPCS: Performed by: NURSE PRACTITIONER

## 2020-01-06 PROCEDURE — 90471 IMMUNIZATION ADMIN: CPT | Performed by: NURSE PRACTITIONER

## 2020-01-06 RX ADMIN — TETANUS TOXOID, REDUCED DIPHTHERIA TOXOID AND ACELLULAR PERTUSSIS VACCINE, ADSORBED 0.5 ML: 5; 2.5; 8; 8; 2.5 SUSPENSION INTRAMUSCULAR at 14:07

## 2020-01-06 ASSESSMENT — PAIN SCALES - WONG BAKER: WONGBAKER_NUMERICALRESPONSE: 4

## 2020-01-06 ASSESSMENT — PAIN DESCRIPTION - PAIN TYPE: TYPE: ACUTE PAIN

## 2020-01-06 ASSESSMENT — PAIN DESCRIPTION - LOCATION: LOCATION: MOUTH

## 2020-01-06 NOTE — ED PROVIDER NOTES
Tobacco Use    Smoking status: Current Some Day Smoker     Packs/day: 0.50     Years: 40.00     Pack years: 20.00     Types: Cigarettes    Smokeless tobacco: Never Used   Substance and Sexual Activity    Alcohol use: No     Comment: no alcohol in 2 months    Drug use: Not Currently     Types: Marijuana     Comment: hx of last used OCt 2011    Sexual activity: Never     No current facility-administered medications on file prior to encounter. Current Outpatient Medications on File Prior to Encounter   Medication Sig Dispense Refill    gabapentin (NEURONTIN) 400 MG capsule Take 2 capsules by mouth 2 times daily for 30 days. 120 capsule 0    venlafaxine (EFFEXOR XR) 75 MG extended release capsule Take 1 capsule by mouth daily (with breakfast) 30 capsule 0    cetirizine (ZYRTEC) 5 MG tablet Take 1 tablet by mouth daily 30 tablet 0    fenofibrate 160 MG tablet Take 1 tablet by mouth daily 30 tablet 0    tamsulosin (FLOMAX) 0.4 MG capsule Take 1 capsule by mouth daily 30 capsule 3    pantoprazole (PROTONIX) 40 MG tablet Take 1 tablet by mouth daily 30 tablet 0    risperiDONE (RISPERDAL) 2 MG tablet Take 1 tablet by mouth nightly 30 tablet 0    QUEtiapine (SEROQUEL) 400 MG tablet Take 2 tablets by mouth nightly 60 tablet 0    pantoprazole (PROTONIX) 40 MG tablet Take 1 tablet by mouth daily 30 tablet 3    tamsulosin (FLOMAX) 0.4 MG capsule Take 1 capsule by mouth daily 30 capsule 0    HYDROcodone-acetaminophen (NORCO) 5-325 MG per tablet Take 1 tablet by mouth three times daily. No Known Allergies    REVIEW OF SYSTEMS  Unable to to obtain due to patient's refusal.    PHYSICAL EXAM  Pulse 130   Temp 97.4 °F (36.3 °C) (Oral)   Resp 18   Ht 5' 8\" (1.727 m)   Wt 120 lb (54.4 kg)   SpO2 100%   BMI 18.25 kg/m²     Physical Exam  Vitals signs and nursing note reviewed. Constitutional:       General: He is not in acute distress. Appearance: He is well-developed.  He is not toxic-appearing or diaphoretic. HENT:      Head: Normocephalic. Laceration (3 cm) present. Eyes:      General:         Right eye: No discharge. Left eye: No discharge. Extraocular Movements: Extraocular movements intact. Conjunctiva/sclera: Conjunctivae normal.      Pupils: Pupils are equal, round, and reactive to light. Neck:      Musculoskeletal: Normal range of motion and neck supple. Pulmonary:      Effort: Pulmonary effort is normal. No respiratory distress. Skin:     General: Skin is warm and dry. Findings: No rash. Neurological:      Mental Status: He is alert and oriented to person, place, and time. Psychiatric:         Speech: Speech normal.         Behavior: Behavior normal.         Thought Content: Thought content normal.         Judgment: Judgment normal.         Labs:    Labs Reviewed - No data to display    All other labs were within normal range or not returned as of this dictation. EKG: All EKG's are interpreted by the Emergency Department Physician who either signs or Co-signs this chart in the absence of a cardiologist.Please see their note for interpretation of EKG. RADIOLOGY:   Non-plain film images such as CT, Ultrasound and MRI are read by the radiologist. Plain radiographic images are visualized and preliminarily interpreted by the  ED Provider with the below findings:    Interpretation per the Radiologistbelow, if available at the time of this note:    No orders to display     No results found. ED COURSE/ MDM    Patient was given the following medications:  Medications   Tetanus-Diphth-Acell Pertussis (BOOSTRIX) injection 0.5 mL (0.5 mLs Intramuscular Given 1/6/20 4278)            Patient seen and evaluated. After initial evaluation, differential diagnostic considerations included: abrasion/laceration, contusion, fracture, sprain/strain, dislocation    Lac repaired and Boostrix given.     Laceration Repair Procedure Note    Indication: Laceration    Procedure: The patient was placed in the appropriate position and anesthesia around the laceration was obtained by infiltration using 1% Lidocaine without epinephrine. The area was then cleansed with Shur-Clens and draped in a sterile fashion. The laceration was closed with 6-0 Vicryl using interrupted sutures. There were no additional lacerations requiring repair. The wound area was then dressed with bacitracin. Total repaired wound length: 3 cm. Other Items: Suture count: 6    The patient tolerated the procedure well. Complications: None    Afebrile, non toxic inappearance, in no acute distress, pulse ox is 100% on room air and is not hypoxic. Will be discharged to home in stable condition with outpatient follow up. Instructed patient and/or family to return to the emergency room for new or worsening symptoms and any concerns. Instructed to call referrals below to discuss ER visit and follow-up plan. The patient tolerated their visit well. The patient and / or the family were informed of the results of any tests, a time was given to answer questions, a plan was proposed and they agreed with plan. Patient was given scripts for the following medications. I counseled patient how to take these medications. Discharge Medication List as of 1/6/2020  3:06 PM          CLINICAL IMPRESSION  1. Facial laceration, initial encounter        Pulse 130, temperature 97.4 °F (36.3 °C), temperature source Oral, resp. rate 18, height 5' 8\" (1.727 m), weight 120 lb (54.4 kg), SpO2 100 %.     DISPOSITION  DISPOSITION Decision To Discharge 01/06/2020 03:06:12 PM      PATIENT REFERRED TO:  MOIZ Messer CNP      As needed    [facility] ED  [address]  [phone]  Go to   for concerns, worsening or emergent symptoms      DISCHARGE MEDICATIONS:  Discharge Medication List as of 1/6/2020  3:06 PM          DISCONTINUED MEDICATIONS:  Discharge

## 2020-01-06 NOTE — ED NOTES
Bed: TR  Expected date:   Expected time:   Means of arrival:   Comments:  [address]  01/06/20 0910

## 2020-02-27 ENCOUNTER — HOSPITAL ENCOUNTER (INPATIENT)
Age: 55
LOS: 13 days | Discharge: HOME | DRG: 885 | End: 2020-03-11
Attending: EMERGENCY MEDICINE | Admitting: PSYCHIATRY & NEUROLOGY
Payer: MEDICARE

## 2020-02-27 PROBLEM — F25.9 SCHIZO AFFECTIVE SCHIZOPHRENIA (HCC): Status: ACTIVE | Noted: 2020-02-27

## 2020-02-27 LAB
A/G RATIO: 1.3 (ref 1.1–2.2)
ACETAMINOPHEN LEVEL: <5 UG/ML (ref 10–30)
ALBUMIN SERPL-MCNC: 3.8 G/DL (ref 3.4–5)
ALP BLD-CCNC: 83 U/L (ref 40–129)
ALT SERPL-CCNC: 8 U/L (ref 10–40)
AMPHETAMINE SCREEN, URINE: NORMAL
ANION GAP SERPL CALCULATED.3IONS-SCNC: 16 MMOL/L (ref 3–16)
AST SERPL-CCNC: 12 U/L (ref 15–37)
BACTERIA: ABNORMAL /HPF
BARBITURATE SCREEN URINE: NORMAL
BASOPHILS ABSOLUTE: 0.1 K/UL (ref 0–0.2)
BASOPHILS RELATIVE PERCENT: 0.7 %
BENZODIAZEPINE SCREEN, URINE: NORMAL
BILIRUB SERPL-MCNC: <0.2 MG/DL (ref 0–1)
BILIRUBIN URINE: NEGATIVE
BLOOD, URINE: NEGATIVE
BUN BLDV-MCNC: 14 MG/DL (ref 7–20)
CALCIUM SERPL-MCNC: 8.7 MG/DL (ref 8.3–10.6)
CANNABINOID SCREEN URINE: NORMAL
CHLORIDE BLD-SCNC: 102 MMOL/L (ref 99–110)
CLARITY: CLEAR
CO2: 22 MMOL/L (ref 21–32)
COCAINE METABOLITE SCREEN URINE: NORMAL
COLOR: YELLOW
CREAT SERPL-MCNC: 1.4 MG/DL (ref 0.9–1.3)
EKG ATRIAL RATE: 92 BPM
EKG DIAGNOSIS: NORMAL
EKG P AXIS: 69 DEGREES
EKG P-R INTERVAL: 124 MS
EKG Q-T INTERVAL: 364 MS
EKG QRS DURATION: 100 MS
EKG QTC CALCULATION (BAZETT): 450 MS
EKG R AXIS: 61 DEGREES
EKG T AXIS: 66 DEGREES
EKG VENTRICULAR RATE: 92 BPM
EOSINOPHILS ABSOLUTE: 0.7 K/UL (ref 0–0.6)
EOSINOPHILS RELATIVE PERCENT: 7.4 %
ETHANOL: NORMAL MG/DL (ref 0–0.08)
GFR AFRICAN AMERICAN: >60
GFR NON-AFRICAN AMERICAN: 52
GLOBULIN: 3 G/DL
GLUCOSE BLD-MCNC: 117 MG/DL (ref 70–99)
GLUCOSE URINE: NEGATIVE MG/DL
HCT VFR BLD CALC: 33.8 % (ref 40.5–52.5)
HEMOGLOBIN: 11.3 G/DL (ref 13.5–17.5)
KETONES, URINE: NEGATIVE MG/DL
LEUKOCYTE ESTERASE, URINE: NEGATIVE
LYMPHOCYTES ABSOLUTE: 3 K/UL (ref 1–5.1)
LYMPHOCYTES RELATIVE PERCENT: 34.1 %
Lab: NORMAL
MAGNESIUM: 2 MG/DL (ref 1.8–2.4)
MCH RBC QN AUTO: 31.4 PG (ref 26–34)
MCHC RBC AUTO-ENTMCNC: 33.4 G/DL (ref 31–36)
MCV RBC AUTO: 94.1 FL (ref 80–100)
METHADONE SCREEN, URINE: NORMAL
MICROSCOPIC EXAMINATION: YES
MONOCYTES ABSOLUTE: 0.7 K/UL (ref 0–1.3)
MONOCYTES RELATIVE PERCENT: 8.1 %
MUCUS: ABNORMAL /LPF
NEUTROPHILS ABSOLUTE: 4.4 K/UL (ref 1.7–7.7)
NEUTROPHILS RELATIVE PERCENT: 49.7 %
NITRITE, URINE: NEGATIVE
OPIATE SCREEN URINE: NORMAL
OXYCODONE URINE: NORMAL
PDW BLD-RTO: 13.5 % (ref 12.4–15.4)
PH UA: 6
PH UA: 6 (ref 5–8)
PHENCYCLIDINE SCREEN URINE: NORMAL
PLATELET # BLD: 337 K/UL (ref 135–450)
PMV BLD AUTO: 7.8 FL (ref 5–10.5)
POTASSIUM SERPL-SCNC: 3.2 MMOL/L (ref 3.5–5.1)
PROPOXYPHENE SCREEN: NORMAL
PROTEIN UA: ABNORMAL MG/DL
RBC # BLD: 3.59 M/UL (ref 4.2–5.9)
RBC UA: ABNORMAL /HPF (ref 0–4)
SALICYLATE, SERUM: <0.3 MG/DL (ref 15–30)
SODIUM BLD-SCNC: 140 MMOL/L (ref 136–145)
SPECIFIC GRAVITY UA: 1.02 (ref 1–1.03)
TOTAL CK: 167 U/L (ref 39–308)
TOTAL PROTEIN: 6.8 G/DL (ref 6.4–8.2)
TROPONIN: <0.01 NG/ML
TSH SERPL DL<=0.05 MIU/L-ACNC: 0.29 UIU/ML (ref 0.27–4.2)
URINE REFLEX TO CULTURE: ABNORMAL
URINE TYPE: ABNORMAL
UROBILINOGEN, URINE: 0.2 E.U./DL
WBC # BLD: 8.8 K/UL (ref 4–11)
WBC UA: ABNORMAL /HPF (ref 0–5)

## 2020-02-27 PROCEDURE — G0480 DRUG TEST DEF 1-7 CLASSES: HCPCS

## 2020-02-27 PROCEDURE — 84443 ASSAY THYROID STIM HORMONE: CPT

## 2020-02-27 PROCEDURE — 36415 COLL VENOUS BLD VENIPUNCTURE: CPT

## 2020-02-27 PROCEDURE — 81001 URINALYSIS AUTO W/SCOPE: CPT

## 2020-02-27 PROCEDURE — 85025 COMPLETE CBC W/AUTO DIFF WBC: CPT

## 2020-02-27 PROCEDURE — 93010 ELECTROCARDIOGRAM REPORT: CPT | Performed by: INTERNAL MEDICINE

## 2020-02-27 PROCEDURE — 99285 EMERGENCY DEPT VISIT HI MDM: CPT

## 2020-02-27 PROCEDURE — 93005 ELECTROCARDIOGRAM TRACING: CPT | Performed by: EMERGENCY MEDICINE

## 2020-02-27 PROCEDURE — 80053 COMPREHEN METABOLIC PANEL: CPT

## 2020-02-27 PROCEDURE — 80307 DRUG TEST PRSMV CHEM ANLYZR: CPT

## 2020-02-27 PROCEDURE — 6370000000 HC RX 637 (ALT 250 FOR IP): Performed by: NURSE PRACTITIONER

## 2020-02-27 PROCEDURE — 84484 ASSAY OF TROPONIN QUANT: CPT

## 2020-02-27 PROCEDURE — 82550 ASSAY OF CK (CPK): CPT

## 2020-02-27 PROCEDURE — 1240000000 HC EMOTIONAL WELLNESS R&B

## 2020-02-27 PROCEDURE — 83735 ASSAY OF MAGNESIUM: CPT

## 2020-02-27 RX ORDER — TRAZODONE HYDROCHLORIDE 50 MG/1
50 TABLET ORAL NIGHTLY PRN
Status: DISCONTINUED | OUTPATIENT
Start: 2020-02-28 | End: 2020-02-27

## 2020-02-27 RX ORDER — ACETAMINOPHEN 325 MG/1
650 TABLET ORAL EVERY 4 HOURS PRN
Status: DISCONTINUED | OUTPATIENT
Start: 2020-02-27 | End: 2020-03-11 | Stop reason: HOSPADM

## 2020-02-27 RX ORDER — PROPRANOLOL HYDROCHLORIDE 10 MG/1
20 TABLET ORAL 2 TIMES DAILY
Status: DISCONTINUED | OUTPATIENT
Start: 2020-02-27 | End: 2020-03-11 | Stop reason: HOSPADM

## 2020-02-27 RX ORDER — BENZTROPINE MESYLATE 1 MG/ML
2 INJECTION INTRAMUSCULAR; INTRAVENOUS 2 TIMES DAILY PRN
Status: DISCONTINUED | OUTPATIENT
Start: 2020-02-27 | End: 2020-03-11 | Stop reason: HOSPADM

## 2020-02-27 RX ORDER — OLANZAPINE 10 MG/1
10 INJECTION, POWDER, LYOPHILIZED, FOR SOLUTION INTRAMUSCULAR
Status: COMPLETED | OUTPATIENT
Start: 2020-02-27 | End: 2020-02-27

## 2020-02-27 RX ORDER — BENZTROPINE MESYLATE 1 MG/1
2 TABLET ORAL 2 TIMES DAILY
Status: DISCONTINUED | OUTPATIENT
Start: 2020-02-27 | End: 2020-03-11 | Stop reason: HOSPADM

## 2020-02-27 RX ORDER — PROPRANOLOL HYDROCHLORIDE 20 MG/1
20 TABLET ORAL 2 TIMES DAILY
Status: ON HOLD | COMMUNITY
End: 2020-03-11 | Stop reason: SDUPTHER

## 2020-02-27 RX ORDER — MAGNESIUM HYDROXIDE/ALUMINUM HYDROXICE/SIMETHICONE 120; 1200; 1200 MG/30ML; MG/30ML; MG/30ML
30 SUSPENSION ORAL EVERY 6 HOURS PRN
Status: DISCONTINUED | OUTPATIENT
Start: 2020-02-27 | End: 2020-03-11 | Stop reason: HOSPADM

## 2020-02-27 RX ORDER — TRAZODONE HYDROCHLORIDE 50 MG/1
50 TABLET ORAL NIGHTLY PRN
Status: DISCONTINUED | OUTPATIENT
Start: 2020-02-27 | End: 2020-03-11 | Stop reason: HOSPADM

## 2020-02-27 RX ORDER — IBUPROFEN 400 MG/1
400 TABLET ORAL EVERY 6 HOURS PRN
Status: DISCONTINUED | OUTPATIENT
Start: 2020-02-27 | End: 2020-03-09

## 2020-02-27 RX ORDER — NICOTINE 21 MG/24HR
1 PATCH, TRANSDERMAL 24 HOURS TRANSDERMAL DAILY
Status: DISCONTINUED | OUTPATIENT
Start: 2020-02-28 | End: 2020-03-11 | Stop reason: HOSPADM

## 2020-02-27 RX ORDER — OLANZAPINE 10 MG/1
10 TABLET ORAL
Status: COMPLETED | OUTPATIENT
Start: 2020-02-27 | End: 2020-02-27

## 2020-02-27 RX ORDER — BENZTROPINE MESYLATE 2 MG/1
2 TABLET ORAL 2 TIMES DAILY
Status: ON HOLD | COMMUNITY
End: 2020-03-11 | Stop reason: HOSPADM

## 2020-02-27 RX ADMIN — PROPRANOLOL HYDROCHLORIDE 20 MG: 10 TABLET ORAL at 23:13

## 2020-02-27 RX ADMIN — IBUPROFEN 400 MG: 400 TABLET, FILM COATED ORAL at 23:13

## 2020-02-27 RX ADMIN — TRAZODONE HYDROCHLORIDE 50 MG: 50 TABLET ORAL at 23:16

## 2020-02-27 RX ADMIN — BENZTROPINE MESYLATE 2 MG: 1 TABLET ORAL at 23:13

## 2020-02-27 RX ADMIN — OLANZAPINE 10 MG: 10 TABLET, FILM COATED ORAL at 23:13

## 2020-02-27 ASSESSMENT — SLEEP AND FATIGUE QUESTIONNAIRES
DO YOU USE A SLEEP AID: NO
DO YOU HAVE DIFFICULTY SLEEPING: YES
DIFFICULTY STAYING ASLEEP: YES
AVERAGE NUMBER OF SLEEP HOURS: 0
DIFFICULTY FALLING ASLEEP: YES
DIFFICULTY ARISING: NO
SLEEP PATTERN: DIFFICULTY FALLING ASLEEP;INSOMNIA;RESTLESSNESS;NIGHTMARES/TERRORS
RESTFUL SLEEP: NO

## 2020-02-27 ASSESSMENT — LIFESTYLE VARIABLES: HISTORY_ALCOHOL_USE: YES

## 2020-02-27 ASSESSMENT — ENCOUNTER SYMPTOMS
SHORTNESS OF BREATH: 0
ABDOMINAL PAIN: 1

## 2020-02-27 ASSESSMENT — PAIN SCALES - GENERAL
PAINLEVEL_OUTOF10: 9
PAINLEVEL_OUTOF10: 9

## 2020-02-27 NOTE — ED PROVIDER NOTES
Psychiatric/Behavioral: Positive for suicidal ideas. The patient is nervous/anxious. Positives and Pertinent negatives as per HPI. PASTMEDICAL HISTORY     Past Medical History:   Diagnosis Date    Anxiety     Arthritis     Clostridium difficile diarrhea 1/10/15    Depression     Difficulty urinating     [facility] [address]     Hematoma     pt unsure where it was    Hx of blood clots     pt unsure where    Hyperlipidemia     Hypertension     Pneumonia     Psychosis ([facility] [address]          SURGICAL HISTORY       Past Surgical History:   Procedure Laterality Date    COLONOSCOPY      COLONOSCOPY  11/30/2016    colon polyp    TONSILLECTOMY           CURRENT MEDICATIONS       Current Discharge Medication List      CONTINUE these medications which have NOT CHANGED    Details   benztropine (COGENTIN) 2 MG tablet Take 2 mg by mouth 2 times daily      propranolol (INDERAL) 20 MG tablet Take 20 mg by mouth 2 times daily      gabapentin (NEURONTIN) 400 MG capsule Take 2 capsules by mouth 2 times daily for 30 days.   Qty: 120 capsule, Refills: 0      venlafaxine (EFFEXOR XR) 75 MG extended release capsule Take 1 capsule by mouth daily (with breakfast)  Qty: 30 capsule, Refills: 0      cetirizine (ZYRTEC) 5 MG tablet Take 1 tablet by mouth daily  Qty: 30 tablet, Refills: 0      fenofibrate 160 MG tablet Take 1 tablet by mouth daily  Qty: 30 tablet, Refills: 0      !! tamsulosin (FLOMAX) 0.4 MG capsule Take 1 capsule by mouth daily  Qty: 30 capsule, Refills: 3      !! pantoprazole (PROTONIX) 40 MG tablet Take 1 tablet by mouth daily  Qty: 30 tablet, Refills: 0      risperiDONE (RISPERDAL) 2 MG tablet Take 1 tablet by mouth nightly  Qty: 30 tablet, Refills: 0      QUEtiapine (SEROQUEL) 400 MG tablet Take 2 tablets by mouth nightly  Qty: 60 tablet, Refills: 0      !! pantoprazole (PROTONIX) 40 MG tablet Take 1 tablet by mouth daily  Qty: 30 tablet, Refills: 3      !! tamsulosin (FLOMAX) 0.4 MG capsule Take 1 capsule by mouth daily  Qty: 30 capsule, Refills: 0      HYDROcodone-acetaminophen (NORCO) 5-325 MG per tablet Take 1 tablet by mouth three times daily. !! - Potential duplicate medications found. Please discuss with provider. ALLERGIES     Patient has no known allergies.     FAMILY HISTORY       Family History   Problem Relation Age of Onset    Heart Disease Mother     Mental Illness Mother     Cancer Father     Heart Disease Brother     Mental Illness Brother           SOCIAL HISTORY       Social History     Socioeconomic History    Marital status:      Spouse name: None    Number of children: 1    Years of education: 6    Highest education level: None   Occupational History    None   Social Needs    Financial resource strain: None    Food insecurity:     Worry: None     Inability: None    Transportation needs:     Medical: None     Non-medical: None   Tobacco Use    Smoking status: Current Some Day Smoker     Packs/day: 0.50     Years: 40.00     Pack years: 20.00     Types: Cigarettes    Smokeless tobacco: Never Used   Substance and Sexual Activity    Alcohol use: No     Comment: no alcohol in 2 months    Drug use: Not Currently     Types: Marijuana     Comment: hx of last used OCt 2011    Sexual activity: Never   Lifestyle    Physical activity:     Days per week: None     Minutes per session: None    Stress: None   Relationships    Social connections:     Talks on phone: None     Gets together: None     Attends [employer] service: None     Active member of club or organization: None     Attends meetings of clubs or organizations: None     Relationship status: None    Intimate partner violence:     Fear of current or ex partner: None     Emotionally abused: None     Physically abused: None     Forced sexual activity: None   Other Topics Concern    None   Social History Narrative    None       SCREENINGS                PHYSICAL EXAM    (up to 7 for level 4, 8 or more for level 5)     ED Triage Vitals [02/27/20 1553]   BP Temp Temp src Pulse Resp SpO2 Height Weight   (!) 172/100 97.8 °F (36.6 °C) -- 109 18 99 % 5' 4\" (1.626 m) 142 lb (64.4 kg)       Physical Exam  Vitals signs and nursing note reviewed. Constitutional:       General: He is awake. He is not in acute distress. Appearance: He is well-developed and normal weight. He is not ill-appearing, toxic-appearing or diaphoretic. HENT:      Head: Normocephalic and atraumatic. Right Ear: External ear normal.      Left Ear: External ear normal.      Mouth/Throat:      Mouth: Mucous membranes are dry. Eyes:      Pupils: Pupils are equal, round, and reactive to light. Neck:      Musculoskeletal: Normal range of motion and neck supple. No muscular tenderness. Cardiovascular:      Rate and Rhythm: Normal rate and regular rhythm. Pulses: Normal pulses. Pulmonary:      Effort: Pulmonary effort is normal. No respiratory distress. Breath sounds: Normal breath sounds. No stridor. No wheezing, rhonchi or rales. Abdominal:      General: Abdomen is flat. Bowel sounds are normal. There is no distension. Palpations: Abdomen is soft. Tenderness: There is no abdominal tenderness. There is no guarding or rebound. Musculoskeletal:         General: No tenderness, deformity or signs of injury. Skin:     General: Skin is warm and dry. Neurological:      General: No focal deficit present. Mental Status: He is alert. GCS: GCS eye subscore is 4. GCS verbal subscore is 4. GCS motor subscore is 6. Motor: Motor function is intact. No weakness, tremor, atrophy, abnormal muscle tone or seizure activity. Gait: Gait is intact. Gait normal.   Psychiatric:         Attention and Perception: Attention normal.         Mood and Affect: Mood is anxious. Speech: Speech is rapid and pressured. Behavior: Behavior is hyperactive. Behavior is cooperative.          Thought Content: Thought content is paranoid. Thought content includes suicidal ideation. Thought content does not include suicidal plan. Cognition and Memory: He exhibits impaired recent memory.              DIAGNOSTIC RESULTS   :    Labs Reviewed   CBC WITH AUTO DIFFERENTIAL - Abnormal; Notable for the following components:       Result Value    RBC 3.59 (*)     Hemoglobin 11.3 (*)     Hematocrit 33.8 (*)     Eosinophils Absolute 0.7 (*)     All other components within normal limits    Narrative:     Performed at:  [facility],  [place], [email or web address]   Phone [phone]   COMPREHENSIVE METABOLIC PANEL - Abnormal; Notable for the following components:    Potassium 3.2 (*)     Glucose 117 (*)     CREATININE 1.4 (*)     GFR Non- 52 (*)     ALT 8 (*)     AST 12 (*)     All other components within normal limits    Narrative:     Performed at:  [facility],  [employer]   Phone [phone]   SALICYLATE LEVEL - Abnormal; Notable for the following components:    Salicylate, Serum <0.3 (*)     All other components within normal limits    Narrative:     Performed at:  [facility],  [place], [email or web address]   Phone [phone]   ACETAMINOPHEN LEVEL - Abnormal; Notable for the following components:    Acetaminophen Level <5 (*)     All other components within normal limits    Narrative:     Performed at:  [facility],  [place], [email or web address]   Phone [phone]   URINE RT REFLEX TO CULTURE - Abnormal; Notable for the following components:    Protein, UA TRACE (*)     All other components within normal limits    Narrative:     Performed at:  [facility],  [place]   Phone [phone]   MICROSCOPIC URINALYSIS - Abnormal; Notable for the following components:    Mucus, UA Rare (*)     Bacteria, UA Rare (*)     All other components within normal limits    Narrative:     Performed at:  [facility],  [place], [facility]   Phone [phone]   TROPONIN    Narrative:     Performed at:  [facility],  [place], [facility]   Phone [phone]   CK    Narrative:     Performed at:  [facility] [address],  [place], [facility]   Phone [phone]   MAGNESIUM    Narrative:     Performed at:  [place] [place] [address],  [place], [facility]   Phone [phone]   ETHANOL    Narrative:     Performed at:  [facility] [address],  [place], [facility]   Phone [phone]   URINE DRUG SCREEN    Narrative:     Performed at:  [place] [address],  [place], [facility]   Phone [phone]   TSH WITHOUT REFLEX    Narrative:     Performed at:  [facility],  [place], [facility]   Phone [phone]       All other labs were within normal range or not returned asof this dictation. EKG: All EKG's are interpreted by the Emergency Department Physician who either signs or Co-signs this chart in the absence of a cardiologist.    The Ekg interpreted by me shows  normal sinus rhythm with a rate of 92  Axis is   Normal  QTc is  normal  Intervals and Durations are unremarkable.       ST Segments: no acute change and normal  No significant change from prior EKG dated - 1/8/15  No STEMI           RADIOLOGY:   Non-plain film images such as CT, Ultrasound and MRI are read by the radiologist. [facility] are visualized and preliminarily interpreted by the  ED Provider with the belowfindings:        Interpretation per the Radiologist below, if available at the time of this note:    No orders to display         PROCEDURES   Unless otherwise noted below, none     Procedures    CRITICAL CARE TIME   N/A    CONSULTS: Patient was admitted to psych for further evaluation. IP CONSULT TO HOSPITALIST    EMERGENCY DEPARTMENT COURSE and DIFFERENTIAL DIAGNOSIS/MDM:   Vitals:    Vitals:    02/27/20 1553 02/27/20 2030 02/27/20 2210   BP: (!) 172/100 (!) 152/88 (!) 148/82   Pulse: 109 84 87   Resp: 18 16 18   Temp: 97.8 °F (36.6 °C) 97.4 °F (36.3 °C) 97.2 °F (36.2 °C)   TempSrc:   Oral   SpO2: 99% 99% 97%   Weight: 142 lb (64.4 kg)     Height: 5' 4\" (1.626 m)         Patient was given the following medications:  Medications   benztropine (COGENTIN) tablet 2 mg (2 mg Oral Given 2/27/20 2313)   propranolol (INDERAL) tablet 20 mg (20 mg Oral Given 2/27/20 2313)   acetaminophen (TYLENOL) tablet 650 mg (has no administration in time range)   ibuprofen (ADVIL;MOTRIN) tablet 400 mg (400 mg Oral Given 2/27/20 2313)   sterile water injection 2.1 mL (has no administration in time range)   benztropine mesylate (COGENTIN) injection 2 mg (has no administration in time range)   magnesium hydroxide (MILK OF MAGNESIA) 400 MG/5ML suspension 30 mL (has no administration in time range)   aluminum & magnesium hydroxide-simethicone (MAALOX) 200-200-20 MG/5ML suspension 30 mL (has no administration in time range)   nicotine (NICODERM CQ) 21 MG/24HR 1 patch (has no administration in time range)   traZODone (DESYREL) tablet 50 mg (50 mg Oral Given 2/27/20 2316)   OLANZapine (ZYPREXA) tablet 10 mg (10 mg Oral Given 2/27/20 2313)     Or   OLANZapine (ZYPREXA) injection 10 mg ( Intramuscular See Alternative 2/27/20 2313)     Patient was evaluated due to concern for suicidal thoughts along with being hyperactive although was pleasant in the room. I did fill out a hold form for the patient.   Since he had chest discomfort for the past few days, troponin and EKG were obtained although story not suggestive of acute coronary syndrome or pulmonary embolism and I do believe it is most likely from anxiety. He ambulated without difficulty. He will need further evaluation in the hospital with psychiatric evaluation. He was medically cleared by me. Since he has a history of psychosis, I do not feel that his symptoms are related to meningitis or other cause of altered mental status that warrants further medical evaluation at this point besides psychiatry. The patient tolerated their visit well. The patient and / or the family were informed of the results of any tests, a time was given to answer questions. FINAL IMPRESSION      1. Suicidal ideation    2. Anxiety state    3. Body aches    4.  Chest discomfort          DISPOSITION/PLAN   DISPOSITION Admitted 02/27/2020 09:13:51 PM      PATIENT REFERRED TO:  MOIZ Gold CNP            DISCHARGEMEDICATIONS:  Current Discharge Medication List          DISCONTINUED MEDICATIONS:  Current Discharge Medication List                 (Please note that portions of this note were completed with a voicerecognition program.  Efforts were made to edit the dictations but occasionally words are mis-transcribed.)    Adela Hamilton MD (electronically signed)            Adela Hamilton MD  02/27/20 9406

## 2020-02-27 NOTE — ED NOTES
Charge RN aware of pt's presence in department and that he requires 1:1 darrylter d/t Rafael Causye, RN  02/27/20 4346

## 2020-02-28 PROCEDURE — 6370000000 HC RX 637 (ALT 250 FOR IP): Performed by: PSYCHIATRY & NEUROLOGY

## 2020-02-28 PROCEDURE — 1240000000 HC EMOTIONAL WELLNESS R&B

## 2020-02-28 PROCEDURE — 6370000000 HC RX 637 (ALT 250 FOR IP): Performed by: NURSE PRACTITIONER

## 2020-02-28 PROCEDURE — 99221 1ST HOSP IP/OBS SF/LOW 40: CPT | Performed by: NURSE PRACTITIONER

## 2020-02-28 PROCEDURE — 99223 1ST HOSP IP/OBS HIGH 75: CPT | Performed by: PSYCHIATRY & NEUROLOGY

## 2020-02-28 RX ORDER — GABAPENTIN 300 MG/1
600 CAPSULE ORAL 3 TIMES DAILY
Status: DISCONTINUED | OUTPATIENT
Start: 2020-02-28 | End: 2020-03-06

## 2020-02-28 RX ORDER — TAMSULOSIN HYDROCHLORIDE 0.4 MG/1
0.4 CAPSULE ORAL DAILY
Status: DISCONTINUED | OUTPATIENT
Start: 2020-02-28 | End: 2020-03-11 | Stop reason: HOSPADM

## 2020-02-28 RX ORDER — OLANZAPINE 10 MG/1
20 TABLET, ORALLY DISINTEGRATING ORAL ONCE
Status: COMPLETED | OUTPATIENT
Start: 2020-02-28 | End: 2020-02-28

## 2020-02-28 RX ORDER — CETIRIZINE HYDROCHLORIDE 10 MG/1
5 TABLET ORAL DAILY
Status: DISCONTINUED | OUTPATIENT
Start: 2020-02-28 | End: 2020-03-11 | Stop reason: HOSPADM

## 2020-02-28 RX ORDER — FENOFIBRATE 160 MG/1
160 TABLET ORAL DAILY
Status: DISCONTINUED | OUTPATIENT
Start: 2020-02-28 | End: 2020-03-11 | Stop reason: HOSPADM

## 2020-02-28 RX ORDER — PANTOPRAZOLE SODIUM 40 MG/1
40 TABLET, DELAYED RELEASE ORAL
Status: DISCONTINUED | OUTPATIENT
Start: 2020-02-29 | End: 2020-03-11 | Stop reason: HOSPADM

## 2020-02-28 RX ORDER — OLANZAPINE 10 MG/1
10 TABLET ORAL 2 TIMES DAILY
Status: DISCONTINUED | OUTPATIENT
Start: 2020-02-28 | End: 2020-03-06

## 2020-02-28 RX ADMIN — FENOFIBRATE 160 MG: 160 TABLET ORAL at 14:25

## 2020-02-28 RX ADMIN — TAMSULOSIN HYDROCHLORIDE 0.4 MG: 0.4 CAPSULE ORAL at 14:25

## 2020-02-28 RX ADMIN — OLANZAPINE 20 MG: 10 TABLET, ORALLY DISINTEGRATING ORAL at 11:05

## 2020-02-28 RX ADMIN — CETIRIZINE HYDROCHLORIDE 5 MG: 10 TABLET ORAL at 14:25

## 2020-02-28 RX ADMIN — PROPRANOLOL HYDROCHLORIDE 20 MG: 10 TABLET ORAL at 08:17

## 2020-02-28 RX ADMIN — PROPRANOLOL HYDROCHLORIDE 20 MG: 10 TABLET ORAL at 21:28

## 2020-02-28 RX ADMIN — GABAPENTIN 600 MG: 300 CAPSULE ORAL at 14:25

## 2020-02-28 RX ADMIN — BENZTROPINE MESYLATE 2 MG: 1 TABLET ORAL at 08:17

## 2020-02-28 RX ADMIN — TRAZODONE HYDROCHLORIDE 50 MG: 50 TABLET ORAL at 21:28

## 2020-02-28 RX ADMIN — BENZTROPINE MESYLATE 2 MG: 1 TABLET ORAL at 21:28

## 2020-02-28 RX ADMIN — GABAPENTIN 600 MG: 300 CAPSULE ORAL at 21:28

## 2020-02-28 RX ADMIN — OLANZAPINE 10 MG: 10 TABLET, FILM COATED ORAL at 21:28

## 2020-02-28 ASSESSMENT — PAIN DESCRIPTION - DESCRIPTORS: DESCRIPTORS: ACHING

## 2020-02-28 ASSESSMENT — SLEEP AND FATIGUE QUESTIONNAIRES: RESTFUL SLEEP: NO

## 2020-02-28 ASSESSMENT — PAIN DESCRIPTION - PAIN TYPE: TYPE: CHRONIC PAIN

## 2020-02-28 ASSESSMENT — PAIN DESCRIPTION - LOCATION: LOCATION: ABDOMEN

## 2020-02-28 ASSESSMENT — LIFESTYLE VARIABLES: HISTORY_ALCOHOL_USE: YES

## 2020-02-28 ASSESSMENT — PAIN SCALES - GENERAL
PAINLEVEL_OUTOF10: 0
PAINLEVEL_OUTOF10: 5
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
## 2020-02-28 NOTE — BH NOTE
Therapist completed Leisure Assessment.  Daniel Vale engaged minimally in conversation, stating, \"Man, just let me out of here\" and then stating he did not wish to talk to therapist. Daniel Vale engaged in good eye contact and appeared anxious, but calm in his request.

## 2020-02-28 NOTE — BH NOTE
Pt approached writer stating \"I need to be tied down, I feel like Im going to do something stupid. \" Pt was evasive as to what he felt he would do, pt did report he did not feel like he would harm himself. Pt making statements such as, \"I am not adequate here. \" MD made aware and one time order of Zyprexa Zydis ordered and given.

## 2020-02-28 NOTE — BH NOTE
Writer approached pt and he stated \"I don't feel good. \" When asked to elaborate he stated he could not. Writer asked if he felt he may harm himself or others which he replied, \"Yes. \" Dr Shawna Cobos made aware and new order to lock patients room until he is able to feel safe.

## 2020-02-28 NOTE — PLAN OF CARE
Problem: Depressive Behavior With or Without Suicide Precautions:  Goal: Ability to disclose and discuss suicidal ideas will improve  Description  Ability to disclose and discuss suicidal ideas will improve  Outcome: Ongoing  Note:   Pt currently pacing unit. Denies SI/HI/AVH. Pt reports the PRN Zyprexa zydis given earlier \"didn't do much. \" Pt is paranoid and evasive, unwilling to elaborate on how he is feeling. Pt does report he feels that he could talk to a staff member if he has any thoughts of harming himself or others.

## 2020-02-28 NOTE — H&P
Psychiatric Admission Evaluation       Admission Date:    2/27/2020  Identifying Info:   Patient is a 62 y.o. male with hx of psychosis   Patient was admitted to psychiatric unit on a involuntarily basis. Chief complaint:  psychosis    HPI: 63 y/o wm very well known to our sevice from previous admissions that was admitted for command A/H telling him to harm self and others. Pt stated that he has been having a reaction to haldol D received 1/23 at [facility]. Pt stated that he was in [place] due to been in correction. Pt stated that nothing is going to work except for the [place] [address] and reports that he is not feeling well and he is not sure what he will do while here in the unit. Pt stated I am going to touch things and they are going to break. Pt appears to be paranoid and when ask questions he will say is top secret and not at liberty to answer. Per chart reviewed \" Pt recently left [facility] and is currently staying at [facility]. Reportely pt seldom sleeps and wonders the motel grounds all night. Pt brought in my his  Zakia Perez, after he told her and Dr. Wale Tracey that voices where telling him to hurt himself and to hurt other people. Per Dr. Wale Tracey- Pt received Haldol 100 mg IM on 1/23/20. Since this time pt has been hyperkinetic, almost constantly walking in place or pacing, sometimes he will walk in a [place] in place. Pt has been taking propanolol 20 mg BID and benztropine 2 mg BID for the hyperkinetics but has seen little to no improvement. Pt is very reluctant to answer many of my questions, often telling me that, Cranesahil Cottrell is top secret. \"  According to Dr. Wale Tracey and Melvin Galvez, the patient has been very paranoid and he cant do things that he would normally do, like get in the car and go look at apartments. These are new behaviors, which  all manifested after receiving the haldol injection.   After this writer finished speaking with the pt, while walking away in a lowered voice he stated, \"think you were safe. This isn't going good for me, shouldn't have let me out of correction. \"  Pt continues to pace and walk in place. \"     current medications    Prior to Admission medications    Medication Sig Start Date End Date Taking? Authorizing Provider   benztropine (COGENTIN) 2 MG tablet Take 2 mg by mouth 2 times daily   Yes Historical Provider, MD   propranolol (INDERAL) 20 MG tablet Take 20 mg by mouth 2 times daily   Yes Historical Provider, MD   gabapentin (NEURONTIN) 400 MG capsule Take 2 capsules by mouth 2 times daily for 30 days. 8/26/19 9/25/19  Kasey Jean MD   venlafaxine (EFFEXOR XR) 75 MG extended release capsule Take 1 capsule by mouth daily (with breakfast) 8/27/19   Kasey Jean MD   cetirizine (ZYRTEC) 5 MG tablet Take 1 tablet by mouth daily 8/27/19   Kasey Jean MD   fenofibrate 160 MG tablet Take 1 tablet by mouth daily 8/27/19   Kasey Jean MD   [facility]) 0.4 MG capsule Take 1 capsule by mouth daily 8/27/19   Kasey Jean MD   pantoprazole (PROTONIX) 40 MG tablet Take 1 tablet by mouth daily 8/27/19   Kasey Jean MD   risperiDONE (RISPERDAL) 2 MG tablet Take 1 tablet by mouth nightly 8/27/19   Kasey Jean MD   QUEtiapine (SEROQUEL) 400 MG tablet Take 2 tablets by mouth nightly 8/5/19   Kasey Jean MD   pantoprazole (PROTONIX) 40 MG tablet Take 1 tablet by mouth daily 8/6/19   Kasey Jean MD   [facility]) 0.4 MG capsule Take 1 capsule by mouth daily 8/5/19   Kasey Jean MD   HYDROcodone-acetaminophen (NORCO) 5-325 MG per tablet Take 1 tablet by mouth three times daily. Historical Provider, MD       Past psychiatric and medical history:  Hosp:Multiple hospitalizations [facility] 8/2019 and he was released from [facility] 2 wks ago. OD 30 yrs ago OutpatientTx: [facility]    is Zakia Perez [phone]. MD is Dr. Wale Tracey [phone]    Abuse History:  Uses marijuana. He has a history of drinking  heavily.   He has been sober for 10 years and states he was having an occasional drink. Social Hx: [facility]. Pt does not have support system, Bro passed away in 2018. Pt does not work. Denies hx of trauma and denies any legal issues. Family Mental Health Hx:   None reported     Mental Status Examination:    Level of consciousness:  within normal limits and awake  Appearance:  well-appearing, street clothes, fair grooming, fair hygiene and pacing well-developed, well-nourished  Behavior/Motor:  psychomotor agitation shuffling AIMS: 0  Attitude toward examiner:  intense eye contact, evasive and guarded  Speech:  spontaneous, normal rate and normal volume Mood:  anxious Affect:  intense Hallucinations: distracted and stated hearing command A/H telling to harm self and others   Thought processes:  linear  Attention: attention span appeared shorter than expected for age Thought content:  paranoid focus on medicine Abstraction: impair  OCD: none   Insight: poor Judgement: poor  Cognition:  oriented to person, place, and time  Fund of Knowledge: average   IQ: average Memory: hank  Suicide:  general plan to harm self Sleep: has restless sleep Appetite: ok     Inventory of strengths and weaknesses:Caregiver support  ROS:  See Medical H&PE    PE:  BP (!) 141/79   Pulse 89   Temp 98.9 °F (37.2 °C) (Oral)   Resp 16   Ht 5' 4\" (1.626 m)   Wt 142 lb (64.4 kg)   SpO2 99%   BMI 24.37 kg/m²     Cranial Nerves: 1-12 appear to be intact .   LAB:   Admission on 02/27/2020   Component Date Value Ref Range Status    WBC 02/27/2020 8.8  4.0 - 11.0 K/uL Final    RBC 02/27/2020 3.59* 4.20 - 5.90 M/uL Final    Hemoglobin 02/27/2020 11.3* 13.5 - 17.5 g/dL Final    Hematocrit 02/27/2020 33.8* 40.5 - 52.5 % Final    MCV 02/27/2020 94.1  80.0 - 100.0 fL Final    MCH 02/27/2020 31.4  26.0 - 34.0 pg Final    MCHC 02/27/2020 33.4  31.0 - 36.0 g/dL Final    RDW 02/27/2020 13.5  12.4 - 15.4 % Final    Platelets CK 02/27/2020 167  39 - 308 U/L Final    Ventricular Rate 02/27/2020 92  BPM Final    Atrial Rate 02/27/2020 92  BPM Final    P-R Interval 02/27/2020 124  ms Final    QRS Duration 02/27/2020 100  ms Final    Q-T Interval 02/27/2020 364  ms Final    QTc Calculation (Bazett) 02/27/2020 450  ms Final    P Axis 02/27/2020 69  degrees Final    R Axis 02/27/2020 61  degrees Final    T Axis 02/27/2020 66  degrees Final    Diagnosis 02/27/2020 Normal sinus rhythmNormal ECGWhen compared with ECG of 08-JAN-2015 14:17,No significant change was foundConfirmed by JAXON Truong MD ([id number]) on 2/27/2020 4:47:24 PM   Final    Magnesium 02/27/2020 2.00  1.80 - 2.40 mg/dL Final    Salicylate, Serum 12/53/9169 <0.3* 15.0 - 30.0 mg/dL Final    Comment: Therapeutic Range: 15.0-30.0 mg/dL  Toxic: >30.0 mg/dL      Ethanol Lvl 02/27/2020 None Detected  mg/dL Final    Comment:    None Detected  Conversion factor:  100 mg/dl = .100 g/dl  For Medical Purposes Only      Acetaminophen Level 02/27/2020 <5* 10 - 30 ug/mL Final    Comment: Therapeutic Range: 10.0-30.0 ug/mL  Toxic: >=150 ug/mL      Color, UA 02/27/2020 Yellow  Straw/Yellow Final    Clarity, UA 02/27/2020 Clear  Clear Final    Glucose, Ur 02/27/2020 Negative  Negative mg/dL Final    Bilirubin Urine 02/27/2020 Negative  Negative Final    Ketones, Urine 02/27/2020 Negative  Negative mg/dL Final    Specific [place], UA 02/27/2020 1.020  1.005 - 1.030 Final    Blood, Urine 02/27/2020 Negative  Negative Final    pH, UA 02/27/2020 6.0  5.0 - 8.0 Final    Protein, UA 02/27/2020 TRACE* Negative mg/dL Final    Urobilinogen, Urine 02/27/2020 0.2  <2.0 E.U./dL Final    Nitrite, Urine 02/27/2020 Negative  Negative Final    Leukocyte Esterase, Urine 02/27/2020 Negative  Negative Final    Microscopic Examination 02/27/2020 YES   Final    Urine Type 02/27/2020 NotGiven   Final    Urine received in a container without preservatives.     Urine Reflex to Culture 02/27/2020 Not Indicated   Final    Amphetamine Screen, Urine 02/27/2020 Neg  Negative <1000ng/mL Final    Barbiturate Screen, Ur 02/27/2020 Neg  Negative <200 ng/mL Final    Benzodiazepine Screen, Urine 02/27/2020 Neg  Negative <200 ng/mL Final    Cannabinoid Scrn, Ur 02/27/2020 Neg  Negative <50 ng/mL Final    Cocaine Metabolite Screen, Urine 02/27/2020 Neg  Negative <300 ng/mL Final    Opiate Scrn, Ur 02/27/2020 Neg  Negative <300 ng/mL Final    Comment: \"Therapeutic levels of pain medication, especially oxycontin and synthetic  opioids, may not be detected by this Methodology. Pain management screen  panel  Drug panel-PM-Hi Res Ur, Interp (PAIN) should be considered for drug  monitoring \".  PCP Screen, Urine 02/27/2020 Neg  Negative <25 ng/mL Final    Methadone Screen, Urine 02/27/2020 Neg  Negative <300 ng/mL Final    Propoxyphene Scrn, Ur 02/27/2020 Neg  Negative <300 ng/mL Final    Oxycodone Urine 02/27/2020 Neg  Negative <100 ng/ml Final    pH, UA 02/27/2020 6.0   Final    Comment: Urine pH less than 5.0 or greater than 8.0 may indicate sample adulteration. Another sample should be collected if clinically  indicated.  Drug Screen Comment: 02/27/2020 see below   Final    Comment: This method is a screening test to detect only these drug  classes as part of a medical workup. Confirmatory testing  by another method should be ordered if clinically indicated.  Mucus, UA 02/27/2020 Rare* None Seen /LPF Final    WBC, UA 02/27/2020 0-2  0 - 5 /HPF Final    RBC, UA 02/27/2020 None seen  0 - 4 /HPF Final    Bacteria, UA 02/27/2020 Rare* None Seen /HPF Final    TSH 02/27/2020 0.29  0.27 - 4.20 uIU/mL Final         Formulation: Pt appears to have had a bad reaction to haldol and siaplying sx's of akathisia already on inderal. Pt cont to verbalized threats to self and others.  Will start zyprexa and gabapentin    Dx: axis I: SAD bipolar type, alcohol abuse and cannabis abuse  Axis 2: MOIZ Linder - CNP        nicotine (NICODERM CQ) 21 MG/24HR 1 patch  1 patch Transdermal Daily MOIZ Mcknight - CNP        traZODone (DESYREL) tablet 50 mg  50 mg Oral Nightly PRN MOIZ Mcknight CNP   50 mg at 02/27/20 2316      gabapentin  600 mg Oral TID    cetirizine  5 mg Oral Daily    [START ON 2/29/2020] pantoprazole  40 mg Oral QAM AC    tamsulosin  0.4 mg Oral Daily    fenofibrate  160 mg Oral Daily    OLANZapine  10 mg Oral BID    benztropine  2 mg Oral BID    propranolol  20 mg Oral BID    nicotine  1 patch Transdermal Daily      PRN Meds: acetaminophen, ibuprofen, sterile water, benztropine mesylate, magnesium hydroxide, aluminum & magnesium hydroxide-simethicone, traZODone   Estimated length of stay: 3-5 days  Prognosis:  poor   Criteria for Discharge:    Not suicidal, sleeping well, affect stable, depression improving, eating well, aftercare arranged.        Spent at least 70 minutes with evaluation process and more than 50% of the time was spent obtaining history and planning treatment with the patient

## 2020-02-28 NOTE — H&P
Hospital Medicine History & Physical      PCP: OMIZ Gonzalez CNP    Date of Admission: 2/27/2020    Date of Service: Pt seen/examined on 2/28/2020     Chief Complaint:    Chief Complaint   Patient presents with    Medication Reaction     pt. had haldol shot x2 weeks ago and has had hystonic movemets since injection. caregiver states now that the medications is wearing off pt. has begun w/suicidal ideations and hallucinations    Suicidal       History Of Present Illness: The patient is a 62 y.o. male with DM type 2, hypertension, hyperlipidemia, GERD, chronic pain, CKD stage 3, depression, anxiety, schizophrenia, arthritis, and h/o blood clots who presented to [facility] ED with complaint of SI and dystonic reaction related to Haldol.  + fevers, chest pain, chronic pain. Patient was seen and evaluated in the ED by the ED medical provider, patient was medically cleared for admission to [facility]. This note serves as an admission medical H&P.    PCP: Rubens SORENSON  Tobacco use: current  ETOH use: denies  Illicit drug use: denies    Patient c/o cough. No other symptoms. Past Medical History:        Diagnosis Date    Anxiety     Arthritis     Clostridium difficile diarrhea 1/10/15    Depression     Difficulty urinating     DM [facility]di[facility] mellitus) ([facility] [address]     Hematoma     pt unsure where it was    Hx of blood clots     pt unsure where    Hyperlipidemia     Hypertension     Pneumonia     Psychosis ([facility] [address]        Past Surgical History:        Procedure Laterality Date    COLONOSCOPY      COLONOSCOPY  11/30/2016    colon polyp    TONSILLECTOMY         Medications Prior to Admission:    Prior to Admission medications    Medication Sig Start Date End Date Taking?  Authorizing Provider   benztropine (COGENTIN) 2 MG tablet Take 2 mg by mouth 2 times daily   Yes Historical Provider, MD   propranolol (INDERAL) 20 MG tablet Take 20 mg by mouth 2 times daily   Yes Historical Provider, MD

## 2020-02-28 NOTE — ED NOTES
Presenting Problem: Pt having command hallucinations, telling him to hurt other people and hurt himself. Pt has been hyperkinetic since receiving Haldol IM on 1/23/20. Appearance/Hygiene:  hospital attire, fair grooming and fair hygiene   Motor Behavior: Hyperkinetic. Most constantly walking in place, intermittently will stop and walk around in a [place] in place. Attitude: cooperative  Affect: flat affect   Speech: normal pitch and normal volume  Mood: dysthymic   Thought Processes: Illogical  Perceptions: Pt refuses to answer this question,he states it is \"top secret. \"  Dr. Nathan Weiner states the voices told him to hurt himself and to hurt other people. Thought content: Poverty   Suicidal ideation:  Pt denies. Homicidal ideation:   Pt denies  Orientation: A&Ox4   Memory: intact  Concentration: Poor    Insight/ judgement: Unable to assess. Psychosocial and contextual factors: Pt released from [place] about 2 weeks ago to intermediate, then went to [facility], which is where he is currently living. C-SSRS Summary (including current and past suicidal ideation, plan, intent, and attempts) : Denies SI/SA. Psychiatric History: Yes    Patient reported diagnosis: \"None\"  Chart states schizoaffective bipolar type, and MDD. Outpatient services/ Provider: [facility]. Pt does not know who his counselors name.  is Ramakrishna Dewitt [phone]. MD is Dr. Nathan Weiner [phone]    Previous Inpatient Admissions( including location and dates if known): [facility] several times. [place] X 2. Self-injurious/ Self-harm behavior: Denies.      History of violence: Denies    Current Substance use: Denies    Trauma identified: Denies    Access to Firearms: Denies    ASSESSMENT FOR IMMINENT FUTURE DANGER:      RISK FACTORS:    [x]  Age <25 or >49   [x]  Male gender   [x]  Depressed mood   []  Active suicidal ideation   []  Suicide plan   []  Suicide attempt   []  Access to lethal means   []  Prior suicide attempt   [] Active substance abuse   [x]  Highly impulsive behaviors   []  Not attending to self-care/ADLs    []  Recent significant loss   []  Chronic pain or medical illness   []  Social isolation   []  History of violence   [x]  Active psychosis   []  Cognitive impairment    []  No outpatient services in place   []  Medication noncompliance   []  No collateral information to support safety     PROTECTIVE FACTORS:  [] Age >25 and <55  [] Female gender   [] Denies depression  [x] Denies suicidal ideation  [] Does not have lethal plan   [] Does not have access to guns or weapons  [] Patient is verbally miguel angel for safety  [] No prior suicide attempts  [] No active substance abuse  [] Patient has social or family support  [] No active psychosis or cognitive dysfunction  [x] Physically healthy  [] Has outpatient services in place  [] Compliant with recommended medications    Clinical Summary:    Patient presents to the ED on a SOB after telling his  and his doctor that he was having command hallucinations telling his to hurt himself and to hurt other people. Patient was clinically sober at the time of the evaluation. Patient was evaluated and offered supportive counseling. Pt recently left [facility] and is currently staying at [facility] Reportely pt seldom sleeps and wonders the motel grounds all night. Pt brought in my his  Yaneth Purple, after he told her and Dr. Edmund Pickering that voices where telling him to hurt himself and to hurt other people. Per Dr. Edmund Pickering- Pt received Haldol 100 mg IM on 1/23/20. Since this time pt has been hyperkinetic, almost constantly walking in place or pacing, sometimes he will walk in a [place] in place. Pt has been taking propanolol 20 mg BID and benztropine 2 mg BID for the hyperkinetics but has seen little to no improvement. Pt is very reluctant to answer many of my questions, often telling me that, Ted Dorado is top secret. \"  According to Dr. Edmund Pickering and Sierra, the patient has been very paranoid and he cant do things that he would normally do, like get in the car and go look at apartments. These are new behaviors, which  all manifested after receiving the haldol injection. After this writer finished speaking with the pt, while walking away in a lowered voice he stated, \"think you were safe. This isn't going good for me, shouldn't have let me out of [facility]. \"  Pt continues to pace and walk in place.       Rosmery Slaughter RN  02/27/20 2049

## 2020-02-28 NOTE — PROGRESS NOTES
..   `[facility]  Admission Note     Admission Type:   Admission Type: Involuntary    Reason for admission:Pt has had command hallucinations that at times have told him to harm self and others. Pt has contracted for safety for self and others. Pt is guarded and evasive and sometimes will say, \"I take the 5th. \" Pt did say he knows that if he takes his meds he will feel better, and that was one of his positive statements. He does feel positive about his relationship with B. PATIENT STRENGTHS:  Strengths: No significant Physical Illness, Connection to output provider    Patient Strengths and Limitations:  Limitations: Tendency to isolate self, Difficult relationships / poor social skills, Lacks leisure interests, General negative or hopeless attitude about future/recovery, Hopeless about future    Addictive Behavior:   Addictive Behavior  In the past 3 months, have you felt or has someone told you that you have a problem with:  : None  Do you have a history of Chemical Use?: Yes(all of the below are years ago per pt)  Do you have a history of Alcohol Use?: Yes  Do you have a history of Street Drug Abuse?: Yes  Histroy of Prescripton Drug Abuse?: Yes  States he's been clean for a long time.   Medical Problems:   Past Medical History:   Diagnosis Date    Anxiety     Arthritis     Clostridium difficile diarrhea 1/10/15    Depression     Difficulty urinating     [facility]) [facility] [address]     Hematoma     pt unsure where it was    Hx of blood clots     pt unsure where    Hyperlipidemia     Hypertension     Pneumonia     Psychosis ([facility] [address]        Status EXAM:  Status and Exam  Normal: No  Facial Expression: Worried, Sad, Flat  Affect: Constricted, Unstable  Level of Consciousness: Alert  Mood:Normal: No  Mood: Depressed, Anxious, Sad  Motor Activity:Normal: No  Motor Activity: Increased  Interview Behavior: Cooperative, Evasive, Irritable(slightly irritable)  Preception: [place] to

## 2020-02-29 LAB
CHOLESTEROL, TOTAL: 135 MG/DL (ref 0–199)
HDLC SERPL-MCNC: 46 MG/DL (ref 40–60)
LDL CHOLESTEROL CALCULATED: 75 MG/DL
TRIGL SERPL-MCNC: 70 MG/DL (ref 0–150)
VLDLC SERPL CALC-MCNC: 14 MG/DL

## 2020-02-29 PROCEDURE — 36415 COLL VENOUS BLD VENIPUNCTURE: CPT

## 2020-02-29 PROCEDURE — 83036 HEMOGLOBIN GLYCOSYLATED A1C: CPT

## 2020-02-29 PROCEDURE — 80061 LIPID PANEL: CPT

## 2020-02-29 PROCEDURE — 6370000000 HC RX 637 (ALT 250 FOR IP): Performed by: PSYCHIATRY & NEUROLOGY

## 2020-02-29 PROCEDURE — 1240000000 HC EMOTIONAL WELLNESS R&B

## 2020-02-29 PROCEDURE — 6370000000 HC RX 637 (ALT 250 FOR IP): Performed by: NURSE PRACTITIONER

## 2020-02-29 RX ORDER — OLANZAPINE 5 MG/1
5 TABLET ORAL ONCE
Status: COMPLETED | OUTPATIENT
Start: 2020-02-29 | End: 2020-02-29

## 2020-02-29 RX ADMIN — FENOFIBRATE 160 MG: 160 TABLET ORAL at 08:36

## 2020-02-29 RX ADMIN — PANTOPRAZOLE SODIUM 40 MG: 40 TABLET, DELAYED RELEASE ORAL at 06:08

## 2020-02-29 RX ADMIN — BENZTROPINE MESYLATE 2 MG: 1 TABLET ORAL at 21:48

## 2020-02-29 RX ADMIN — BENZTROPINE MESYLATE 2 MG: 1 TABLET ORAL at 08:36

## 2020-02-29 RX ADMIN — PROPRANOLOL HYDROCHLORIDE 20 MG: 10 TABLET ORAL at 08:35

## 2020-02-29 RX ADMIN — CETIRIZINE HYDROCHLORIDE 5 MG: 10 TABLET ORAL at 08:36

## 2020-02-29 RX ADMIN — GABAPENTIN 600 MG: 300 CAPSULE ORAL at 14:37

## 2020-02-29 RX ADMIN — OLANZAPINE 10 MG: 10 TABLET, FILM COATED ORAL at 21:49

## 2020-02-29 RX ADMIN — PROPRANOLOL HYDROCHLORIDE 20 MG: 10 TABLET ORAL at 21:48

## 2020-02-29 RX ADMIN — TAMSULOSIN HYDROCHLORIDE 0.4 MG: 0.4 CAPSULE ORAL at 08:36

## 2020-02-29 RX ADMIN — OLANZAPINE 5 MG: 5 TABLET, FILM COATED ORAL at 23:07

## 2020-02-29 RX ADMIN — GABAPENTIN 600 MG: 300 CAPSULE ORAL at 21:48

## 2020-02-29 RX ADMIN — GABAPENTIN 600 MG: 300 CAPSULE ORAL at 08:36

## 2020-02-29 RX ADMIN — OLANZAPINE 10 MG: 10 TABLET, FILM COATED ORAL at 08:36

## 2020-02-29 NOTE — PLAN OF CARE
Problem: Pain:  Goal: Pain level will decrease  Description  Pain level will decrease  Outcome: Ongoing  Goal: Control of acute pain  Description  Control of acute pain  Outcome: Ongoing  Goal: Control of chronic pain  Description  Control of chronic pain  Outcome: Ongoing   Patient offers complaint of chronic pain that nothing helps to decrease. Problem: Anger Management/Homicidal Ideation:  Goal: Able to display appropriate communication and problem solving  Description  Able to display appropriate communication and problem solving  Outcome: Ongoing  Goal: Ability to verbalize frustrations and anger appropriately will improve  Description  Ability to verbalize frustrations and anger appropriately will improve  Outcome: Ongoing  Goal: Absence of angry outbursts  Description  Absence of angry outbursts  Outcome: Ongoing  Goal: Absence of homicidal ideation  Description  Absence of homicidal ideation  Outcome: Ongoing   Patient denied SI/HI A/V hallucinations- stating no AH+ now. He is asked to come to the staff if he begins to have AH+.    Problem: Depressive Behavior With or Without Suicide Precautions:  Goal: Able to verbalize acceptance of life and situations over which he or she has no control  Description  Able to verbalize acceptance of life and situations over which he or she has no control  Outcome: Ongoing  Goal: Able to verbalize and/or display a decrease in depressive symptoms  Description  Able to verbalize and/or display a decrease in depressive symptoms  Outcome: Ongoing  Goal: Ability to disclose and discuss suicidal ideas will improve  Description  Ability to disclose and discuss suicidal ideas will improve  2/28/2020 2231 by Caleen Denver, RN  Outcome: Ongoing  Goal: Able to verbalize support systems  Description  Able to verbalize support systems  Outcome: Ongoing  Goal: Absence of self-harm  Description  Absence of self-harm  Outcome: Ongoing   Patient is pessimistic and negative about majority of care provided to him. He is reluctantly compliant with medications, \"They won't do anything. \" Encouraged to be compliant with medication to help decrease hallucinations anad promote functioning.

## 2020-02-29 NOTE — PLAN OF CARE
Problem: Altered Mood, Psychotic Behavior:  Goal: Ability to interact with others will improve  Description  Ability to interact with others will improve  2/29/2020 0906 by Atiya Benjamin LPN  Outcome: Ongoing  Note:   Out of room for meal. Not social in milieu. When approached in assigned to admin medication sat at side of bed then walked around the bed and requested this writer to stand outside of room. Did come to team station and compliant with med admin.   2/28/2020 2231 by Sammie Peterson RN  Outcome: Ongoing

## 2020-03-01 LAB
ESTIMATED AVERAGE GLUCOSE: 114 MG/DL
HBA1C MFR BLD: 5.6 %

## 2020-03-01 PROCEDURE — 6370000000 HC RX 637 (ALT 250 FOR IP): Performed by: PSYCHIATRY & NEUROLOGY

## 2020-03-01 PROCEDURE — 1240000000 HC EMOTIONAL WELLNESS R&B

## 2020-03-01 PROCEDURE — 6370000000 HC RX 637 (ALT 250 FOR IP): Performed by: NURSE PRACTITIONER

## 2020-03-01 PROCEDURE — 99232 SBSQ HOSP IP/OBS MODERATE 35: CPT | Performed by: PSYCHIATRY & NEUROLOGY

## 2020-03-01 RX ADMIN — PROPRANOLOL HYDROCHLORIDE 20 MG: 10 TABLET ORAL at 08:19

## 2020-03-01 RX ADMIN — OLANZAPINE 10 MG: 10 TABLET, FILM COATED ORAL at 08:19

## 2020-03-01 RX ADMIN — GABAPENTIN 600 MG: 300 CAPSULE ORAL at 15:51

## 2020-03-01 RX ADMIN — TAMSULOSIN HYDROCHLORIDE 0.4 MG: 0.4 CAPSULE ORAL at 08:19

## 2020-03-01 RX ADMIN — BENZTROPINE MESYLATE 2 MG: 1 TABLET ORAL at 08:20

## 2020-03-01 RX ADMIN — GABAPENTIN 600 MG: 300 CAPSULE ORAL at 08:19

## 2020-03-01 RX ADMIN — CETIRIZINE HYDROCHLORIDE 5 MG: 10 TABLET ORAL at 08:20

## 2020-03-01 RX ADMIN — FENOFIBRATE 160 MG: 160 TABLET ORAL at 08:19

## 2020-03-01 NOTE — BH NOTE
[facility]  Day 3 Interdisciplinary Treatment Plan NOTE    Review Date & Time: 3/1/20 1400    Patient was not in treatment team    Admission Type:   Admission Type: Involuntary    Reason for admission:     Estimated Length of Stay Update:  5-7 days  Estimated Discharge Date Update: 3/6/20-3/8/20    PATIENT STRENGTHS:  Patient Strengths Strengths: Connection to output provider(per chart review)  Patient Strengths and Limitations:Limitations: Tendency to isolate self, Difficult relationships / poor social skills, Multiple barriers to leisure interests, Difficulty problem solving/relies on others to help solve problems, Inappropriate/potentially harmful leisure interests(Per chart review)  Addictive Behavior:Addictive Behavior  In the past 3 months, have you felt or has someone told you that you have a problem with:  : (MARKELL)  Do you have a history of Chemical Use?: Yes(per chart review)  Do you have a history of Alcohol Use?: Yes(per chart review)  Do you have a history of Street Drug Abuse?: Yes(per chart review)  Histroy of Prescripton Drug Abuse?: Yes(per chart review)  Medical Problems:  Past Medical History:   Diagnosis Date    Anxiety     Arthritis     Clostridium difficile diarrhea 1/10/15    Depression     Difficulty urinating     DM [facility]diabetes mellitus) ([facility] [address]     Hematoma     pt unsure where it was    Hx of blood clots     pt unsure where    Hyperlipidemia     Hypertension     Pneumonia     Psychosis ([facility] [address]        Risk:  Fall RiskTotal: 81  Danny Scale Danny Scale Score: 22  BVC Total: 1  Change in scores none.  Changes to plan of Care none    Status EXAM:   Status and Exam  Normal: No  Facial Expression: Flat, Exaggerated  Affect: Blunt  Level of Consciousness: Alert  Mood:Normal: No  Mood: Depressed, Labile  Motor Activity:Normal: No  Motor Activity: Decreased  Interview Behavior: Evasive, Impulsive  Preception: [place] to Person, [place] to Place  Attention:Normal: No  Attention: Distractible, Unable to Concentrate  Thought Processes: Blocking, Loose Assoc. Thought Content:Normal: No  Thought Content: Paranoia, Poverty of Content  Hallucinations: Auditory (Comment), Command(Comment)  Delusions: Yes  Delusions: Other(See Comment), Persecution(Paranoia)  Memory:Normal: No  Memory: Poor Recent, Poor Remote, Confabulation  Insight and Judgment: No  Insight and Judgment: Poor Judgment, Poor Insight, Unrealistic  Present Suicidal Ideation: No  Present Homicidal Ideation: No    Daily Assessment Last Entry:   Daily Sleep (WDL): Exceptions to WDL(interrupted sleep)         Patient Currently in Pain: No  Daily Nutrition (WDL): Within Defined Limits  Appetite Change: Decreased  Barriers to Nutrition: None  Level of Assistance: Independent/Self    Patient Monitoring:  Frequency of Checks: 4 times per hour, close    Psychiatric Symptoms:   Depression Symptoms  Depression Symptoms: Isolative  Anxiety Symptoms  Anxiety Symptoms: Ritualistic behavior  Sheree Symptoms  Sheree Symptoms: Poor judgment, Pressured speech     Psychosis Symptoms  Delusion Type: Paranoid    Suicide Risk CSSR-S:  1) Within the past month, have you wished you were dead or wished you could go to sleep and not wake up? : Yes  2) Have you actually had any thoughts of killing yourself? : Yes  3) Have you been thinking about how you might kill yourself? : Yes  5) Have you started to work out or worked out the details of how to kill yourself?  Do you intend to carry out this plan? : No(not at this time)  6) Have you ever done anything, started to do anything, or prepared to do anything to end your life?: Yes  Change in Result none Change in Plan of care none      EDUCATION:   Learner Progress Toward Treatment Goals: Reviewed results and recommendations of this team, Reviewed group plan and strategies and Reviewed goals and plan of care    Method: Individual    Outcome: Refused Education    PATIENT GOALS: none    PLAN/TREATMENT

## 2020-03-02 LAB
ANION GAP SERPL CALCULATED.3IONS-SCNC: 14 MMOL/L (ref 3–16)
BUN BLDV-MCNC: 23 MG/DL (ref 7–20)
CALCIUM SERPL-MCNC: 9 MG/DL (ref 8.3–10.6)
CHLORIDE BLD-SCNC: 101 MMOL/L (ref 99–110)
CO2: 21 MMOL/L (ref 21–32)
CREAT SERPL-MCNC: 1.7 MG/DL (ref 0.9–1.3)
GFR AFRICAN AMERICAN: 51
GFR NON-AFRICAN AMERICAN: 42
GLUCOSE BLD-MCNC: 176 MG/DL (ref 70–99)
HCT VFR BLD CALC: 32.6 % (ref 40.5–52.5)
HEMOGLOBIN: 10.7 G/DL (ref 13.5–17.5)
MCH RBC QN AUTO: 31.2 PG (ref 26–34)
MCHC RBC AUTO-ENTMCNC: 32.8 G/DL (ref 31–36)
MCV RBC AUTO: 95.2 FL (ref 80–100)
PDW BLD-RTO: 13.6 % (ref 12.4–15.4)
PLATELET # BLD: 302 K/UL (ref 135–450)
PMV BLD AUTO: 7.2 FL (ref 5–10.5)
POTASSIUM SERPL-SCNC: 4.4 MMOL/L (ref 3.5–5.1)
RBC # BLD: 3.43 M/UL (ref 4.2–5.9)
SODIUM BLD-SCNC: 136 MMOL/L (ref 136–145)
WBC # BLD: 8.6 K/UL (ref 4–11)

## 2020-03-02 PROCEDURE — 36415 COLL VENOUS BLD VENIPUNCTURE: CPT

## 2020-03-02 PROCEDURE — 6370000000 HC RX 637 (ALT 250 FOR IP): Performed by: PSYCHIATRY & NEUROLOGY

## 2020-03-02 PROCEDURE — 85027 COMPLETE CBC AUTOMATED: CPT

## 2020-03-02 PROCEDURE — 99233 SBSQ HOSP IP/OBS HIGH 50: CPT | Performed by: PSYCHIATRY & NEUROLOGY

## 2020-03-02 PROCEDURE — 1240000000 HC EMOTIONAL WELLNESS R&B

## 2020-03-02 PROCEDURE — 80048 BASIC METABOLIC PNL TOTAL CA: CPT

## 2020-03-02 RX ORDER — LOPERAMIDE HYDROCHLORIDE 2 MG/1
2 CAPSULE ORAL 4 TIMES DAILY PRN
Status: DISCONTINUED | OUTPATIENT
Start: 2020-03-02 | End: 2020-03-11 | Stop reason: HOSPADM

## 2020-03-02 RX ORDER — FLUPHENAZINE HYDROCHLORIDE 5 MG/1
5 TABLET ORAL 2 TIMES DAILY
Status: DISCONTINUED | OUTPATIENT
Start: 2020-03-02 | End: 2020-03-06

## 2020-03-02 RX ADMIN — PANTOPRAZOLE SODIUM 40 MG: 40 TABLET, DELAYED RELEASE ORAL at 06:31

## 2020-03-02 RX ADMIN — OLANZAPINE 10 MG: 10 TABLET, FILM COATED ORAL at 09:27

## 2020-03-02 RX ADMIN — FLUPHENAZINE HYDROCHLORIDE 5 MG: 5 TABLET, FILM COATED ORAL at 13:00

## 2020-03-02 RX ADMIN — GABAPENTIN 600 MG: 300 CAPSULE ORAL at 13:00

## 2020-03-02 ASSESSMENT — PAIN DESCRIPTION - PROGRESSION: CLINICAL_PROGRESSION_2: NOT CHANGED

## 2020-03-02 ASSESSMENT — PAIN DESCRIPTION - LOCATION
LOCATION: ABDOMEN
LOCATION_2: GENERALIZED

## 2020-03-02 ASSESSMENT — PAIN DESCRIPTION - DESCRIPTORS
DESCRIPTORS_2: ACHING
DESCRIPTORS: ACHING

## 2020-03-02 ASSESSMENT — PAIN DESCRIPTION - DURATION: DURATION_2: CONTINUOUS

## 2020-03-02 ASSESSMENT — PAIN DESCRIPTION - PAIN TYPE
TYPE_2: CHRONIC PAIN
TYPE: ACUTE PAIN

## 2020-03-02 ASSESSMENT — PAIN DESCRIPTION - ONSET: ONSET_2: ON-GOING

## 2020-03-02 NOTE — PROGRESS NOTES
Department of Psychiatry  Attending Progress Note  Chief Complaint: follow-up Pt incontinent of stools and refusing to clean up bed and laying down in it. Pt cont to be irritable and stated that he does not want to talk. We discussed that he is not doing well but can not verbalized what is going on. I explained that I am concerned about his ability of caring for self and explained that OT will come and evaluate him for possibility of nursing home. We discussed adding medications to his regimen to treat psychosis prolixin or trilafon. Patient's chart was reviewed and collaborated with  about the treatment plan. SUBJECTIVE:    Patient is feeling unchanged. Suicidal ideation:  denies suicidal ideation. Patient does not have medication side effects. ROS: Patient has new complaints: no  Sleeping adequately:  Yes   Appetite adequate: Yes  Attending groups: No: isolative in room due to psychosis and illness  Visitors:No    OBJECTIVE    Physical  VITALS:  BP (!) 147/70   Pulse 109   Temp 98.2 °F (36.8 °C) (Oral)   Resp 16   Ht 5' 4\" (1.626 m)   Wt 142 lb (64.4 kg)   SpO2 97%   BMI 24.37 kg/m²     Mental Status Examination:  Patients appearance was ill-appearing, lying in bed, poor grooming and poor hygiene. Thoughts are Blocking and Paucity of Ideas. Homicidal ideations voices telling him to hurt others no one specific. No abnormal movements, tics or mannerisms. Memory hank Aims 0. Concentration Poor. Alert and oriented X 4. Insight and Judgement poor. Patient was uncooperative. Patient gait shuffling.  Mood come back later, affect easily irritable Hallucinations Present - Command Hallucinations to hurt others, suicidal ideations no specific plan to harm self Speech delayed and increased latency of response  Data  Labs:   Admission on 02/27/2020   Component Date Value Ref Range Status    WBC 02/27/2020 8.8  4.0 - 11.0 K/uL Final    RBC 02/27/2020 3.59* 4.20 - 5.90 M/uL Final    Hemoglobin resolved hospital problems. *       1. Patient s symptoms   show no change  2. Probable discharge is next week  3. Discharge planning is incomplete  4 Suicidal ideation is unchanged  5 I spent 35 minutes face to face and more than 50 % was spent coordinating care

## 2020-03-02 NOTE — PLAN OF CARE
Problem: Altered Mood, Deterioration in Function:  Goal: Ability to perform activities of daily living will improve  Description  Ability to perform activities of daily living will improve  Outcome: Ongoing  Note:   Per report, pt was incontinent of bowel over night. Pt continues with incontinence this AM, x2. Medical PA and Psychiatrist made aware. Pt has taken two showers this AM d/t incontinence with staff encouragement, when stool was covering his legs and feet. Currently pt is resting in bed, stool is on his sheets but became aggressive when writer approached him to try to change the linen, yelling \"Get out of my room now! \"

## 2020-03-02 NOTE — PROGRESS NOTES
Irritable  Thought processes:  +TB and derailment  Thought Content: +paranoid delusions   Perceptions: + AH, + RTIS  Attention: impaired  Abstraction: concrete  Cognition: Average FLORENCIA, Alert and oriented to person, place, time, and situation, recall influenced by delusions  Insight: Impaired insight   Judgment: Poor judgment      LAB: Reviewed labs from last 24 hours      Dx:   Primary Psychiatric (DSM V) Diagnosis: Schizophrenia, paranoid type, acute exacerbation    All conditions detailed above are being treated while patient is hospitalized. Tx plan: Generally: prevent self injury/aggression towards others, stabilize mood/anxiety/psychotic/behavioral disturbance, establish/maintain aftercare, increase coping mechanisms, improve medication compliance. All conditions present on admission are being treated while pt is hospitalized. Legal Status: Involuntary    Primary Psychiatric Issues:   1.  Schizophrenia:  Continue olanzapine augmentation of haldol  Encourage groups  q15 minute checks  Patient's symptoms   show no change    Medical Problems:  Internal medicine has been consulted. Appreciate recs. Code Status: Full    Disposition:    -Discharge planning is incomplete    Estimated length of stay: 7-10 days    Criteria for Discharge:  Not suicidal, not homicidal, not grossly psychotic, behavioral disturbance improved, sleeping well, mood/affect stable, eating well, aftercare arranged. Total face to face time with patient was 30 minutes and more than 50 % of that time was spent counseling the patient on their symptoms, treatment and expected goals.     Vimal Summers MD  Staff Psychiatrist

## 2020-03-02 NOTE — BH NOTE
Pt allowed writer to change his sheet and take out clothing which had stool on it. Pt at first refused then started helping put a sheet on the bed. No more loose stool at this time. Currently resting in bed, awake.

## 2020-03-02 NOTE — PROGRESS NOTES
Occupational Therapy      Hold OT eval today, per nsg, secondary to pt's agitation. Plan to re-attempt tomorrow.     Danilo Strong, OTR/L  #[id number]

## 2020-03-02 NOTE — PLAN OF CARE
Problem: Depressive Behavior With or Without Suicide Precautions:  Goal: Able to verbalize and/or display a decrease in depressive symptoms  Description  Able to verbalize and/or display a decrease in depressive symptoms  Outcome: Ongoing   Romario Valdes has been in his room all evening. He did not attend group or come out to the desk for a snack. Romario Valdes refused his hs medications this evening, stating that \"I will come and get it in a little bit\". Romario Valdes was very short with his answers and did not want to talk to staff. He refused to answer any other questions.

## 2020-03-02 NOTE — BH NOTE
Pt does report abdominal cramping. Refusing to have temperature checked despite multiple attempts. Michael Godoy CNP aware of refusal and symptoms.

## 2020-03-03 PROCEDURE — 99233 SBSQ HOSP IP/OBS HIGH 50: CPT | Performed by: PSYCHIATRY & NEUROLOGY

## 2020-03-03 PROCEDURE — 1240000000 HC EMOTIONAL WELLNESS R&B

## 2020-03-03 PROCEDURE — 99231 SBSQ HOSP IP/OBS SF/LOW 25: CPT | Performed by: NURSE PRACTITIONER

## 2020-03-03 PROCEDURE — 97165 OT EVAL LOW COMPLEX 30 MIN: CPT

## 2020-03-03 PROCEDURE — 6370000000 HC RX 637 (ALT 250 FOR IP): Performed by: PSYCHIATRY & NEUROLOGY

## 2020-03-03 PROCEDURE — 6370000000 HC RX 637 (ALT 250 FOR IP): Performed by: NURSE PRACTITIONER

## 2020-03-03 RX ORDER — CHLORHEXIDINE GLUCONATE 4 G/100ML
SOLUTION TOPICAL DAILY PRN
Status: DISCONTINUED | OUTPATIENT
Start: 2020-03-03 | End: 2020-03-09

## 2020-03-03 RX ORDER — CEPHALEXIN 500 MG/1
500 CAPSULE ORAL EVERY 12 HOURS SCHEDULED
Status: DISCONTINUED | OUTPATIENT
Start: 2020-03-03 | End: 2020-03-06

## 2020-03-03 RX ORDER — LACTOBACILLUS RHAMNOSUS GG 10B CELL
1 CAPSULE ORAL
Status: DISCONTINUED | OUTPATIENT
Start: 2020-03-04 | End: 2020-03-11 | Stop reason: HOSPADM

## 2020-03-03 RX ADMIN — PROPRANOLOL HYDROCHLORIDE 20 MG: 10 TABLET ORAL at 08:53

## 2020-03-03 RX ADMIN — OLANZAPINE 10 MG: 10 TABLET, FILM COATED ORAL at 08:53

## 2020-03-03 RX ADMIN — PANTOPRAZOLE SODIUM 40 MG: 40 TABLET, DELAYED RELEASE ORAL at 06:41

## 2020-03-03 RX ADMIN — FLUPHENAZINE HYDROCHLORIDE 5 MG: 5 TABLET, FILM COATED ORAL at 08:53

## 2020-03-03 ASSESSMENT — PAIN DESCRIPTION - LOCATION: LOCATION: TEETH

## 2020-03-03 ASSESSMENT — PAIN SCALES - GENERAL: PAINLEVEL_OUTOF10: 5

## 2020-03-03 ASSESSMENT — PAIN DESCRIPTION - PAIN TYPE: TYPE: ACUTE PAIN

## 2020-03-03 NOTE — BH NOTE
Patient refused all vitals and medications. Patient isolative to room and self. Denies all. Says he has problems sleeping but does not want any help. Will continue to monitor.

## 2020-03-03 NOTE — PROGRESS NOTES
Department of Psychiatry  Attending Progress Note  Chief Complaint: follow-up Pt not incontinent of stools today but required a lot of encouragement to take shower and get clean up but he did. Pt stated that he is not feeling well physically because he is ugly and not feeling well emotionally but could not elaborate. I discussed the possibility of nursing home or ecf and he stated that is not possible to do but he would like me to try. Pt cont to be irritable and stated that he does not want to talk. I once again  explained that I am concerned about his ability of caring for self and explained that OT will come and evaluate him for possibility of nursing home/ECF. We discussed cont medications at this time  As prescribed. Patient's chart was reviewed and collaborated with  about the treatment plan. SUBJECTIVE:    Patient is feeling unchanged. Suicidal ideation:  denies suicidal ideation. Patient does not have medication side effects. ROS: Patient has new complaints: no  Sleeping adequately:  Yes   Appetite adequate: Yes  Attending groups: No: isolative in room due to psychosis and illness  Visitors:No    OBJECTIVE    Physical  VITALS:  BP (!) 150/78   Pulse 94   Temp 97 °F (36.1 °C) (Oral)   Resp 16   Ht 5' 4\" (1.626 m)   Wt 142 lb (64.4 kg)   SpO2 97%   BMI 24.37 kg/m²     Mental Status Examination:  Patients appearance was ill-appearing, lying in bed, poor grooming and poor hygiene. Thoughts are Blocking and Paucity of Ideas. Homicidal ideations voices telling him to hurt others no one specific. No abnormal movements, tics or mannerisms. Memory hank Aims 0. Concentration Poor. Alert and oriented X 4. Insight and Judgement poor. Patient was uncooperative. Patient gait shuffling.  Mood come back later, affect easily irritable Hallucinations Present - Command Hallucinations to hurt others, suicidal ideations no specific plan to harm self Speech delayed and increased latency of Final    WBC, UA 02/27/2020 0-2  0 - 5 /HPF Final    RBC, UA 02/27/2020 None seen  0 - 4 /HPF Final    Bacteria, UA 02/27/2020 Rare* None Seen /HPF Final    TSH 02/27/2020 0.29  0.27 - 4.20 uIU/mL Final    Hemoglobin A1C 02/29/2020 5.6  See comment % Final    Comment: Comment:  Diagnosis of Diabetes: > or = 6.5%  Increased risk of diabetes (Prediabetes): 5.7-6.4%  Glycemic Control: Nonpregnant Adults: <7.0%                    Pregnant: <6.0%        eAG 02/29/2020 114.0  mg/dL Final    Cholesterol, Total 02/29/2020 135  0 - 199 mg/dL Final    Triglycerides 02/29/2020 70  0 - 150 mg/dL Final    HDL 02/29/2020 46  40 - 60 mg/dL Final    LDL Calculated 02/29/2020 75  <100 mg/dL Final    VLDL Cholesterol Calculated 02/29/2020 14  Not Established mg/dL Final    Sodium 03/02/2020 136  136 - 145 mmol/L Final    Potassium 03/02/2020 4.4  3.5 - 5.1 mmol/L Final    Chloride 03/02/2020 101  99 - 110 mmol/L Final    CO2 03/02/2020 21  21 - 32 mmol/L Final    Anion Gap 03/02/2020 14  3 - 16 Final    Glucose 03/02/2020 176* 70 - 99 mg/dL Final    BUN 03/02/2020 23* 7 - 20 mg/dL Final    CREATININE 03/02/2020 1.7* 0.9 - 1.3 mg/dL Final    GFR Non- 03/02/2020 42* >60 Final    Comment: >60 mL/min/1.73m2 EGFR, calc. for ages 25 and older using the  MDRD formula (not corrected for weight), is valid for stable  renal function.  GFR  03/02/2020 51* >60 Final    Comment: Chronic Kidney Disease: less than 60 ml/min/1.73 sq.m. Kidney Failure: less than 15 ml/min/1.73 sq.m. Results valid for patients 18 years and older.       Calcium 03/02/2020 9.0  8.3 - 10.6 mg/dL Final    WBC 03/02/2020 8.6  4.0 - 11.0 K/uL Final    RBC 03/02/2020 3.43* 4.20 - 5.90 M/uL Final    Hemoglobin 03/02/2020 10.7* 13.5 - 17.5 g/dL Final    Hematocrit 03/02/2020 32.6* 40.5 - 52.5 % Final    MCV 03/02/2020 95.2  80.0 - 100.0 fL Final    MCH 03/02/2020 31.2  26.0 - 34.0 pg Final    MCHC 03/02/2020 32.8  31.0 - 36.0 g/dL Final    RDW 03/02/2020 13.6  12.4 - 15.4 % Final    Platelets 42/68/6734 302  135 - 450 K/uL Final    MPV 03/02/2020 7.2  5.0 - 10.5 fL Final            Medications  Current Facility-Administered Medications: chlorhexidine (HIBICLENS) 4 % liquid, , Topical, Daily PRN  cephALEXin (KEFLEX) capsule 500 mg, 500 mg, Oral, 2 times per day  [START ON 3/4/2020] lactobacillus (CULTURELLE) capsule 1 capsule, 1 capsule, Oral, Daily with breakfast  fluPHENAZine HCl (PROLIXIN) tablet 5 mg, 5 mg, Oral, BID  loperamide (IMODIUM) capsule 2 mg, 2 mg, Oral, 4x Daily PRN  gabapentin (NEURONTIN) capsule 600 mg, 600 mg, Oral, TID  cetirizine (ZYRTEC) tablet 5 mg, 5 mg, Oral, Daily  pantoprazole (PROTONIX) tablet 40 mg, 40 mg, Oral, QAM AC  tamsulosin (FLOMAX) capsule 0.4 mg, 0.4 mg, Oral, Daily  fenofibrate tablet 160 mg, 160 mg, Oral, Daily  OLANZapine (ZYPREXA) tablet 10 mg, 10 mg, Oral, BID  benztropine (COGENTIN) tablet 2 mg, 2 mg, Oral, BID  propranolol (INDERAL) tablet 20 mg, 20 mg, Oral, BID  acetaminophen (TYLENOL) tablet 650 mg, 650 mg, Oral, Q4H PRN  ibuprofen (ADVIL;MOTRIN) tablet 400 mg, 400 mg, Oral, Q6H PRN  sterile water injection 2.1 mL, 2.1 mL, Intramuscular, Q4H PRN  benztropine mesylate (COGENTIN) injection 2 mg, 2 mg, Intramuscular, BID PRN  magnesium hydroxide (MILK OF MAGNESIA) 400 MG/5ML suspension 30 mL, 30 mL, Oral, Daily PRN  aluminum & magnesium hydroxide-simethicone (MAALOX) 200-200-20 MG/5ML suspension 30 mL, 30 mL, Oral, Q6H PRN  nicotine (NICODERM CQ) 21 MG/24HR 1 patch, 1 patch, Transdermal, Daily  traZODone (DESYREL) tablet 50 mg, 50 mg, Oral, Nightly PRN    ASSESSMENT AND PLAN    Active Problems:    CKD (chronic kidney disease) stage 3, GFR 30-59 ml/min (HCC)    Hypertension, essential    Schizo affective schizophrenia (HCC)    Hypokalemia    Paronychia of left thumb  Resolved Problems:    * No resolved hospital problems. *       1. Patient s symptoms   show no change  2. Probable discharge is next week  3. Discharge planning is incomplete  4 Suicidal ideation is unchanged  5 I spent 35 minutes face to face and more than 50 % was spent coordinating care

## 2020-03-03 NOTE — BH NOTE
PASSR completed and filed for evaluation for ECF. Pt will be awaiting a Level II assessment from [facility].     Joeline Runner MSW, LSW

## 2020-03-03 NOTE — PROGRESS NOTES
Inpatient Occupational Therapy  Evaluation and Treatment    Unit:  [facility]  Date:  3/3/2020  Patient Name:    Gary Desai  Admitting diagnosis:  Schizo affective schizophrenia [facility]) [F25.0]  Admit Date:  2020  Precautions/Restrictions/WB Status/ Lines/ Wounds/ Oxygen:  Up as tolerated  Treatment Time:  15:12-15:29  Treatment Number:  1    Patient Goals for Therapy:  \" How to sleep. \"      Discharge Recommendations:   [x] ECF     DME needs for discharge:   NT     AM-PAC Score:  18     Home Health S4 Level: [x] NA   [] Level 1- Standard  []  Level 2- Social  [] Level 3- Safety  []  Level 4- Sick    ACLS:  TBA      Preadmission Environment:    Pt. Currently homeless. Preadmission Status / PLOF:  History of falls   []Yes  []No  \"I don't remember. \"  Pt. Able to drive   []Yes  [x]No    Pt Fully independent for ADLs/IADLs. [x]Yes  []No  Per pt report. Pt. Required assistance from family for:  []Bathing []Dressing []Cooking []Cleaning  []Laundry  []Other :   Pt. Fully independent for transfers and gait and walked with: [x]No Device  []Walker   []Cane  Sleep Hygiene:  None  Income:  [employer]  Financial Management:  Payee through [employer]. Leisure Interests:  Fish and hunt, minh  Medication Management:  [facility] helps. Pt. Reports that he dosen't know if he takes his medicine or not. Health Management:  Pt. Reports that he dose not have a PCP, Psychiatrist  Or therapist.  Social Network:  [employer]  Stressors:  \"Not knowing where I'm going or what I'm doing. \"  Coping Skills:  \"Nothing makes me feel better. \"    Pain  [x]Yes  []No  Ratin:10  Location:  Lower back  Pain Medicine Status: [] Denies need  [] Pain med requested  [x] RN notified. Cognition    A&Ox person, date. Pt. disorieted to situation and place. Patient presents as agitated and needed much encouragement to participate. Follows []1 step and [x] 2 step commands.   Decreased awareness of errors, decreased short term memory, decreased safety judgement, decreased problem solving, and decreased insight into deficits. Upper Extremity ROM:    WFL, pt able to perform all bed mobility, transfers, and gait without ROM limitation. Upper Extremity Strength:    Numbness/tingling in B hands. All other joints:  WFL, pt able to perform all bed mobility, transfers, and gait without strength limitation. Upper Extremity Sensation:    No apparent deficits. Upper Extremity Proprioception:    No apparent deficits. Skin Integrity:  redness swelling at base of nail on R thumb     Coordination and Tone:  WFL    Balance  Static Sitting:  [x] Good [] Fair [] Poor  Dynamic Sitting:  [x] Good [] Fair [] Poor  Static Standing: [x] Good [] Fair [] Poor  Dynamic Standing: [x] Good [] Fair [] Poor    Bed mobility:  Independent  Supine to sit:  Sit to supine:  Scooting to head of bed:  Scooting in sitting:  Rolling:  Bridging:    Transfers:  Independent  Sit to stand:  Stand to sit:  Bed/Chair to/from toilet:    Self Care: Toileting:  Supervision for errors  Grooming:  Supervision for encouragement, decreased cognition see above  Dressing:  Supervision for encouragement, decreased cognition see above    Exercise / Activities Initiated:   Pt. Educated on role of OT. Pt. Participated in:  Eval, ADL retraining, sleep hygiene and safety. Assessment of Deficits:   Pt demonstrated decreased activity tolerance, decreased safety awareness, decreased strength, decreased cognition and decreased ADL/IADL status. Pt. Limited during evaluation by agitation, decreased cognition, decreased participation. At end of evaluation, pt. In room. Goal(s) : To be met in 3 Visits:  1). Pt. To complete ACLS. 2). Pt. To verbalize understanding of sleep hygiene education. To be met in 5 Visits:  1). Pt. To complete 1 SMART long term goal and 2 SMART short term goals with mod assist.  2). Pt. To verbalize 3 new coping skills. 3). Pt. To complete interest check list.    4).  Pt. To verbalize understanding of 3 communication styles. 5). Pt. To complete wellness plan. 6). Pt. To complete a daily schedule of healthy activities/routines with mod assist.   7). Pt. To identify 2 memory strategies to take medications as prescribed. Rehabilitation Potential:  Good for goals listed above. Strengths for achieving goals include:  PLOF  Barriers to achieving goals include:  Decreased Cognition     Plan: To be seen 2-5x/week while in acute care setting for therapeutic exercises/act, ADL retraining, NMR and patient/caregiver ed/instruction.      Timed Code Treatment Minutes:   0  minutes    Total Treatment Time:    17  minutes    Signature and License Number      Bharath Robison OTR/L  #[id number]      If patient discharges from this facility prior to next visit, this note will serve as the Discharge Summary

## 2020-03-04 PROCEDURE — 6370000000 HC RX 637 (ALT 250 FOR IP): Performed by: NURSE PRACTITIONER

## 2020-03-04 PROCEDURE — 6370000000 HC RX 637 (ALT 250 FOR IP): Performed by: PSYCHIATRY & NEUROLOGY

## 2020-03-04 PROCEDURE — 1240000000 HC EMOTIONAL WELLNESS R&B

## 2020-03-04 PROCEDURE — 99233 SBSQ HOSP IP/OBS HIGH 50: CPT | Performed by: PSYCHIATRY & NEUROLOGY

## 2020-03-04 RX ORDER — CLONAZEPAM 0.5 MG/1
0.5 TABLET ORAL 2 TIMES DAILY
Status: DISCONTINUED | OUTPATIENT
Start: 2020-03-04 | End: 2020-03-11 | Stop reason: HOSPADM

## 2020-03-04 RX ADMIN — GABAPENTIN 600 MG: 300 CAPSULE ORAL at 08:15

## 2020-03-04 RX ADMIN — FLUPHENAZINE HYDROCHLORIDE 5 MG: 5 TABLET, FILM COATED ORAL at 08:15

## 2020-03-04 RX ADMIN — CEPHALEXIN 500 MG: 500 CAPSULE ORAL at 08:15

## 2020-03-04 RX ADMIN — FENOFIBRATE 160 MG: 160 TABLET ORAL at 08:15

## 2020-03-04 RX ADMIN — Medication 1 CAPSULE: at 08:16

## 2020-03-04 RX ADMIN — GABAPENTIN 600 MG: 300 CAPSULE ORAL at 13:43

## 2020-03-04 RX ADMIN — CLONAZEPAM 0.5 MG: 0.5 TABLET ORAL at 13:43

## 2020-03-04 RX ADMIN — BENZTROPINE MESYLATE 2 MG: 1 TABLET ORAL at 08:15

## 2020-03-04 RX ADMIN — CETIRIZINE HYDROCHLORIDE 5 MG: 10 TABLET ORAL at 08:16

## 2020-03-04 RX ADMIN — TAMSULOSIN HYDROCHLORIDE 0.4 MG: 0.4 CAPSULE ORAL at 08:15

## 2020-03-04 RX ADMIN — PROPRANOLOL HYDROCHLORIDE 20 MG: 10 TABLET ORAL at 08:15

## 2020-03-04 RX ADMIN — OLANZAPINE 10 MG: 10 TABLET, FILM COATED ORAL at 08:15

## 2020-03-04 NOTE — PLAN OF CARE
Problem: Altered Mood, Psychotic Behavior:  Goal: Ability to interact with others will improve  Description  Ability to interact with others will improve  3/3/2020 2210 by Dinesh Rocha LPN  Outcome: Not Met This Shift  Client remains withdrawn to room. Appears restless, did change clothes and writer assisted in putting a clean gown on patient. Affect is flat. Continues to report \" I dont need meds. \"

## 2020-03-04 NOTE — BH NOTE
Client is restless and irritable. Evasive with staff. Client is noted to rock back and forth and walk away from writer during interaction. Client did eventualy take medications after writer made  multiple attempts, \" I dont need meds. im fine. Fina Poe I will take them. \"

## 2020-03-04 NOTE — PROGRESS NOTES
Occupational Therapy      Attempted OT follow up. Pt. Received in bed. Pt. Refused stating he didn't want to participate today. Plan to re-attempt tomorrow.     Nevaeh Britt, OTR/L  #[id number]

## 2020-03-04 NOTE — PROGRESS NOTES
Albumin/Globulin Ratio 02/27/2020 1.3  1.1 - 2.2 Final    Total Bilirubin 02/27/2020 <0.2  0.0 - 1.0 mg/dL Final    Alkaline Phosphatase 02/27/2020 83  40 - 129 U/L Final    ALT 02/27/2020 8* 10 - 40 U/L Final    AST 02/27/2020 12* 15 - 37 U/L Final    Globulin 02/27/2020 3.0  g/dL Final    Troponin 02/27/2020 <0.01  <0.01 ng/mL Final    Methodology by Troponin T    Total CK 02/27/2020 167  39 - 308 U/L Final    Ventricular Rate 02/27/2020 92  BPM Final    Atrial Rate 02/27/2020 92  BPM Final    P-R Interval 02/27/2020 124  ms Final    QRS Duration 02/27/2020 100  ms Final    Q-T Interval 02/27/2020 364  ms Final    QTc Calculation (Bazett) 02/27/2020 450  ms Final    P Axis 02/27/2020 69  degrees Final    R Axis 02/27/2020 61  degrees Final    T Axis 02/27/2020 66  degrees Final    Diagnosis 02/27/2020 Normal sinus rhythmNormal ECGWhen compared with ECG of 08-JAN-2015 14:17,No significant change was foundConfirmed by JAXON Chatman MD ([id number]) on 2/27/2020 4:47:24 PM   Final    Magnesium 02/27/2020 2.00  1.80 - 2.40 mg/dL Final    Salicylate, Serum 93/77/6444 <0.3* 15.0 - 30.0 mg/dL Final    Comment: Therapeutic Range: 15.0-30.0 mg/dL  Toxic: >30.0 mg/dL      Ethanol Lvl 02/27/2020 None Detected  mg/dL Final    Comment:    None Detected  Conversion factor:  100 mg/dl = .100 g/dl  For Medical Purposes Only      Acetaminophen Level 02/27/2020 <5* 10 - 30 ug/mL Final    Comment: Therapeutic Range: 10.0-30.0 ug/mL  Toxic: >=150 ug/mL      Color, UA 02/27/2020 Yellow  Straw/Yellow Final    Clarity, UA 02/27/2020 Clear  Clear Final    Glucose, Ur 02/27/2020 Negative  Negative mg/dL Final    Bilirubin Urine 02/27/2020 Negative  Negative Final    Ketones, Urine 02/27/2020 Negative  Negative mg/dL Final    Specific Teller, UA 02/27/2020 1.020  1.005 - 1.030 Final    Blood, Urine 02/27/2020 Negative  Negative Final    pH, UA 02/27/2020 6.0  5.0 - 8.0 Final    Protein, UA 02/27/2020 TRACE* Negative mg/dL Final    Urobilinogen, Urine 02/27/2020 0.2  <2.0 E.U./dL Final    Nitrite, Urine 02/27/2020 Negative  Negative Final    Leukocyte Esterase, Urine 02/27/2020 Negative  Negative Final    Microscopic Examination 02/27/2020 YES   Final    Urine Type 02/27/2020 NotGiven   Final    Urine received in a container without preservatives.  Urine Reflex to Culture 02/27/2020 Not Indicated   Final    Amphetamine Screen, Urine 02/27/2020 Neg  Negative <1000ng/mL Final    Barbiturate Screen, Ur 02/27/2020 Neg  Negative <200 ng/mL Final    Benzodiazepine Screen, Urine 02/27/2020 Neg  Negative <200 ng/mL Final    Cannabinoid Scrn, Ur 02/27/2020 Neg  Negative <50 ng/mL Final    Cocaine Metabolite Screen, Urine 02/27/2020 Neg  Negative <300 ng/mL Final    Opiate Scrn, Ur 02/27/2020 Neg  Negative <300 ng/mL Final    Comment: \"Therapeutic levels of pain medication, especially oxycontin and synthetic  opioids, may not be detected by this Methodology. Pain management screen  panel  Drug panel-PM-Hi Res Ur, Interp (PAIN) should be considered for drug  monitoring \".  PCP Screen, Urine 02/27/2020 Neg  Negative <25 ng/mL Final    Methadone Screen, Urine 02/27/2020 Neg  Negative <300 ng/mL Final    Propoxyphene Scrn, Ur 02/27/2020 Neg  Negative <300 ng/mL Final    Oxycodone Urine 02/27/2020 Neg  Negative <100 ng/ml Final    pH, UA 02/27/2020 6.0   Final    Comment: Urine pH less than 5.0 or greater than 8.0 may indicate sample adulteration. Another sample should be collected if clinically  indicated.  Drug Screen Comment: 02/27/2020 see below   Final    Comment: This method is a screening test to detect only these drug  classes as part of a medical workup. Confirmatory testing  by another method should be ordered if clinically indicated.       Mucus, UA 02/27/2020 Rare* None Seen /LPF Final    WBC, UA 02/27/2020 0-2  0 - 5 /HPF Final    RBC, UA 02/27/2020 None seen  0 - 4 /HPF Final    Bacteria, face and more than 50 % was spent coordinating care

## 2020-03-05 PROCEDURE — 99233 SBSQ HOSP IP/OBS HIGH 50: CPT | Performed by: PSYCHIATRY & NEUROLOGY

## 2020-03-05 PROCEDURE — 6370000000 HC RX 637 (ALT 250 FOR IP): Performed by: NURSE PRACTITIONER

## 2020-03-05 PROCEDURE — 6370000000 HC RX 637 (ALT 250 FOR IP): Performed by: PSYCHIATRY & NEUROLOGY

## 2020-03-05 PROCEDURE — 1240000000 HC EMOTIONAL WELLNESS R&B

## 2020-03-05 RX ADMIN — FENOFIBRATE 160 MG: 160 TABLET ORAL at 08:40

## 2020-03-05 RX ADMIN — BENZTROPINE MESYLATE 2 MG: 1 TABLET ORAL at 20:54

## 2020-03-05 RX ADMIN — CEPHALEXIN 500 MG: 500 CAPSULE ORAL at 20:55

## 2020-03-05 RX ADMIN — CETIRIZINE HYDROCHLORIDE 5 MG: 10 TABLET ORAL at 08:40

## 2020-03-05 RX ADMIN — GABAPENTIN 600 MG: 300 CAPSULE ORAL at 08:40

## 2020-03-05 RX ADMIN — CLONAZEPAM 0.5 MG: 0.5 TABLET ORAL at 08:40

## 2020-03-05 RX ADMIN — CEPHALEXIN 500 MG: 500 CAPSULE ORAL at 08:40

## 2020-03-05 RX ADMIN — GABAPENTIN 600 MG: 300 CAPSULE ORAL at 20:54

## 2020-03-05 RX ADMIN — GABAPENTIN 600 MG: 300 CAPSULE ORAL at 13:30

## 2020-03-05 RX ADMIN — CLONAZEPAM 0.5 MG: 0.5 TABLET ORAL at 20:54

## 2020-03-05 RX ADMIN — PROPRANOLOL HYDROCHLORIDE 20 MG: 10 TABLET ORAL at 20:55

## 2020-03-05 RX ADMIN — FLUPHENAZINE HYDROCHLORIDE 5 MG: 5 TABLET, FILM COATED ORAL at 20:55

## 2020-03-05 RX ADMIN — Medication 1 CAPSULE: at 08:41

## 2020-03-05 RX ADMIN — TAMSULOSIN HYDROCHLORIDE 0.4 MG: 0.4 CAPSULE ORAL at 08:40

## 2020-03-05 RX ADMIN — FLUPHENAZINE HYDROCHLORIDE 5 MG: 5 TABLET, FILM COATED ORAL at 08:40

## 2020-03-05 RX ADMIN — OLANZAPINE 10 MG: 10 TABLET, FILM COATED ORAL at 20:54

## 2020-03-05 RX ADMIN — BENZTROPINE MESYLATE 2 MG: 1 TABLET ORAL at 08:40

## 2020-03-05 RX ADMIN — PROPRANOLOL HYDROCHLORIDE 20 MG: 10 TABLET ORAL at 08:40

## 2020-03-05 RX ADMIN — OLANZAPINE 10 MG: 10 TABLET, FILM COATED ORAL at 08:40

## 2020-03-05 NOTE — BH NOTE
Writer was informed by nursing staff that pt declined the PASSR evaluation despite encouragement. Writer contacted pt's ACT , Alisa Tapiadominick [phone], to inform her of this and to discuss next steps for discharge planning. A VM was left for a return call. Writer received a call from pt's ACT RN Catia Dailey and ACT psychiatrist Dr. Baron Thurston. Writer attempted to speak to pt's progress while inpatient and lack of acute criteria with anticipated discharge. ACT psychiatrist was concerned about this and about pt's housing and services. Writer shared that pt had declined PASSR assessment and was not currently under guardianship and that community group home may need to be explored with pt, but that pt is still the determiner of his follow up and ongoing care right now. ACT psychiatrist was dismissive of writer and asked to speak with pt's attending psychiatrist. She provided her number and hung up the phone. UPDATE: Writer exchanged communications with [address] [place] [address] and [facility]  Kirby Hernandez as well as pt's [address] [place] [address]) ACT CM Supervisor Kiah Martin in discussion of options and anticipated discharge needs for pt. Writer again spoke to lack of guardianship and pt's current preference to discharge to the community when cleared by attending physician. ACT supervisor asked for pt to be given a chest x-ray or TB test in anticipation of application to group home if possible and asked if there was any way that pt's medication regimen could be simplified to one administration per day so that ACT could more closely supervise medication administration with pt. Physician will be notified of requests to be discussed with pt.     [facility] MSW, LSW

## 2020-03-05 NOTE — PROGRESS NOTES
Oral Daily    pantoprazole  40 mg Oral QAM AC    tamsulosin  0.4 mg Oral Daily    fenofibrate  160 mg Oral Daily    OLANZapine  10 mg Oral BID    benztropine  2 mg Oral BID    propranolol  20 mg Oral BID    nicotine  1 patch Transdermal Daily        PRN:  chlorhexidine, loperamide, acetaminophen, ibuprofen, sterile water, benztropine mesylate, magnesium hydroxide, aluminum & magnesium hydroxide-simethicone, traZODone     ASSESSMENT AND PLAN:    Active Problems:    CKD (chronic kidney disease) stage 3, GFR 30-59 ml/min ([facility])    Hypertension, essential    Schizo affective schizophrenia ([facility])    Hypokalemia    Paronychia of left thumb  Resolved Problems:    * No resolved hospital problems. *       1. Patient s symptoms   Minimal improvements  2. Probable discharge is 1-3 days  3. Discharge planning is incomplete  4. Suicidal ideation is unchanged    Total time with patient was 35 minutes and more than 50 % of that time was spent counseling the patient on their symptoms, treatment, and expected goals.

## 2020-03-05 NOTE — PROGRESS NOTES
Occupational Therapy      Attempted OT follow up. Pt. Received in bed. Pt. Refused stating \"leave me alone please\". Plan to re-attempt tomorrow.     Berta Pantoja, OTR/L  #[id number]

## 2020-03-05 NOTE — BH NOTE
[facility] Interdisciplinary Treatment Plan Note     Review Date & Time: 3/5/20 0268    Patient was not in treatment team.    Admission Type:   Admission Type: Involuntary    Reason for admission:       Estimated Length of Stay Update:  todAY   Estimated Discharge Date Update: 3/5/20    PATIENT STRENGTHS:  Patient Strengths:Strengths: Connection to output provider(per chart review)  Patient Strengths and Limitations:Limitations: Tendency to isolate self, Difficult relationships / poor social skills, Multiple barriers to leisure interests, Difficulty problem solving/relies on others to help solve problems, Inappropriate/potentially harmful leisure interests(Per chart review)  Addictive Behavior:Addictive Behavior  In the past 3 months, have you felt or has someone told you that you have a problem with:  : (MARKELL)  Do you have a history of Chemical Use?: Yes(per chart review)  Do you have a history of Alcohol Use?: Yes(per chart review)  Do you have a history of Street Drug Abuse?: Yes(per chart review)  Histroy of Prescripton Drug Abuse?: Yes(per chart review)  Medical Problems:   Past Medical History:   Diagnosis Date    Anxiety     Arthritis     Clostridium difficile diarrhea 1/10/15    Depression     Difficulty urinating     DM [facility]diabetes mellitus) [facility] [address]     Hematoma     pt unsure where it was    Hx of blood clots     pt unsure where    Hyperlipidemia     Hypertension     Pneumonia     Psychosis ([facility] [address]        Risk:  Fall RiskTotal: 77  Danny Scale Danny Scale Score: 21  BVC Total: 1  Change in scores NONE.  Changes to plan of Care NONE    Status EXAM:   Status and Exam  Normal: No  Facial Expression: Flat  Affect: Unstable  Level of Consciousness: Alert  Mood:Normal: No  Mood: Labile, Anxious, Depressed  Motor Activity:Normal: No  Motor Activity: Increased  Interview Behavior: Evasive, Irritable  Preception: [place] to Person  Attention:Normal: No  Attention: Distractible  Thought Processes: Blocking  Thought Content:Normal: No  Thought Content: Poverty of Content  Hallucinations: Auditory (Comment)  Delusions: Yes  Delusions: Persecution  Memory:Normal: No  Memory: Poor Recent, Poor Remote  Insight and Judgment: No  Insight and Judgment: Poor Judgment, Poor Insight  Present Suicidal Ideation: No  Present Homicidal Ideation: No    Daily Assessment Last Entry:   Daily Sleep (WDL): Within Defined Limits         Patient Currently in Pain: Denies  Daily Nutrition (WDL): Within Defined Limits  Appetite Change: Normal for patient  Barriers to Nutrition: None  Level of Assistance: Independent/Self    Patient Monitoring:  Frequency of Checks: 4 times per hour, close    Psychiatric Symptoms:   Depression Symptoms  Depression Symptoms: Isolative, Impaired concentration  Anxiety Symptoms  Anxiety Symptoms: Generalized  Sheree Symptoms  Sheree Symptoms: No problems reported or observed. Psychosis Symptoms  Delusion Type: Paranoid, Persecutory    Suicide Risk CSSR-S:  1) Within the past month, have you wished you were dead or wished you could go to sleep and not wake up? : Yes  2) Have you actually had any thoughts of killing yourself? : Yes  3) Have you been thinking about how you might kill yourself? : Yes  5) Have you started to work out or worked out the details of how to kill yourself?  Do you intend to carry out this plan? : No(not at this time)  6) Have you ever done anything, started to do anything, or prepared to do anything to end your life?: Yes  Change in [facility] in Plan of carenone    EDUCATION:   Learner Progress Toward Treatment Goals: Reviewed goals and plan of care    Method: Individual    Outcome: Verbalized understanding    PATIENT GOALS: refused group    PLAN/TREATMENT RECOMMENDATIONS UPDATE: plan for discharge    GOALS UPDATE:  Time frame for Short-Term Goals: 1 day      Rickie Chacon RN

## 2020-03-05 NOTE — PLAN OF CARE
PT a&o,gait steady free from falls. Pt isolative to self except for getting his meal tray and getting drinks from pt refrigerator. Pt complies with medication,but refuses to attend group or community meeting.

## 2020-03-06 ENCOUNTER — APPOINTMENT (OUTPATIENT)
Dept: GENERAL RADIOLOGY | Age: 55
DRG: 885 | End: 2020-03-06
Payer: MEDICARE

## 2020-03-06 LAB
A/G RATIO: 1.2 (ref 1.1–2.2)
ALBUMIN SERPL-MCNC: 3.5 G/DL (ref 3.4–5)
ALP BLD-CCNC: 92 U/L (ref 40–129)
ALT SERPL-CCNC: 10 U/L (ref 10–40)
ANION GAP SERPL CALCULATED.3IONS-SCNC: 13 MMOL/L (ref 3–16)
AST SERPL-CCNC: 8 U/L (ref 15–37)
BILIRUB SERPL-MCNC: <0.2 MG/DL (ref 0–1)
BUN BLDV-MCNC: 50 MG/DL (ref 7–20)
CALCIUM SERPL-MCNC: 8.8 MG/DL (ref 8.3–10.6)
CHLORIDE BLD-SCNC: 100 MMOL/L (ref 99–110)
CO2: 25 MMOL/L (ref 21–32)
CREAT SERPL-MCNC: 2.7 MG/DL (ref 0.9–1.3)
GFR AFRICAN AMERICAN: 30
GFR NON-AFRICAN AMERICAN: 25
GLOBULIN: 3 G/DL
GLUCOSE BLD-MCNC: 168 MG/DL (ref 70–99)
POTASSIUM SERPL-SCNC: 5 MMOL/L (ref 3.5–5.1)
SODIUM BLD-SCNC: 138 MMOL/L (ref 136–145)
TOTAL PROTEIN: 6.5 G/DL (ref 6.4–8.2)

## 2020-03-06 PROCEDURE — 1240000000 HC EMOTIONAL WELLNESS R&B

## 2020-03-06 PROCEDURE — 36415 COLL VENOUS BLD VENIPUNCTURE: CPT

## 2020-03-06 PROCEDURE — 6370000000 HC RX 637 (ALT 250 FOR IP): Performed by: PSYCHIATRY & NEUROLOGY

## 2020-03-06 PROCEDURE — 6370000000 HC RX 637 (ALT 250 FOR IP): Performed by: NURSE PRACTITIONER

## 2020-03-06 PROCEDURE — 71045 X-RAY EXAM CHEST 1 VIEW: CPT

## 2020-03-06 PROCEDURE — 80053 COMPREHEN METABOLIC PANEL: CPT

## 2020-03-06 PROCEDURE — 99233 SBSQ HOSP IP/OBS HIGH 50: CPT | Performed by: PSYCHIATRY & NEUROLOGY

## 2020-03-06 RX ORDER — OLANZAPINE 10 MG/1
10 TABLET ORAL DAILY
Status: DISCONTINUED | OUTPATIENT
Start: 2020-03-07 | End: 2020-03-11 | Stop reason: HOSPADM

## 2020-03-06 RX ORDER — SODIUM CHLORIDE 9 MG/ML
INJECTION, SOLUTION INTRAVENOUS ONCE
Status: DISCONTINUED | OUTPATIENT
Start: 2020-03-06 | End: 2020-03-06

## 2020-03-06 RX ORDER — GABAPENTIN 100 MG/1
200 CAPSULE ORAL 3 TIMES DAILY
Status: DISCONTINUED | OUTPATIENT
Start: 2020-03-06 | End: 2020-03-09

## 2020-03-06 RX ORDER — SODIUM CHLORIDE 9 MG/ML
INJECTION, SOLUTION INTRAVENOUS CONTINUOUS
Status: DISCONTINUED | OUTPATIENT
Start: 2020-03-06 | End: 2020-03-09

## 2020-03-06 RX ORDER — CEPHALEXIN 250 MG/1
250 CAPSULE ORAL EVERY 12 HOURS SCHEDULED
Status: COMPLETED | OUTPATIENT
Start: 2020-03-06 | End: 2020-03-08

## 2020-03-06 RX ORDER — FLUPHENAZINE HYDROCHLORIDE 10 MG/1
10 TABLET ORAL DAILY
Status: DISCONTINUED | OUTPATIENT
Start: 2020-03-07 | End: 2020-03-11 | Stop reason: HOSPADM

## 2020-03-06 RX ADMIN — BENZTROPINE MESYLATE 2 MG: 1 TABLET ORAL at 21:24

## 2020-03-06 RX ADMIN — CEPHALEXIN 250 MG: 250 CAPSULE ORAL at 21:24

## 2020-03-06 RX ADMIN — FENOFIBRATE 160 MG: 160 TABLET ORAL at 10:05

## 2020-03-06 RX ADMIN — PANTOPRAZOLE SODIUM 40 MG: 40 TABLET, DELAYED RELEASE ORAL at 10:06

## 2020-03-06 RX ADMIN — CLONAZEPAM 0.5 MG: 0.5 TABLET ORAL at 10:05

## 2020-03-06 RX ADMIN — GABAPENTIN 600 MG: 300 CAPSULE ORAL at 15:23

## 2020-03-06 RX ADMIN — TAMSULOSIN HYDROCHLORIDE 0.4 MG: 0.4 CAPSULE ORAL at 10:05

## 2020-03-06 RX ADMIN — FLUPHENAZINE HYDROCHLORIDE 5 MG: 5 TABLET, FILM COATED ORAL at 10:05

## 2020-03-06 RX ADMIN — PROPRANOLOL HYDROCHLORIDE 20 MG: 10 TABLET ORAL at 13:06

## 2020-03-06 RX ADMIN — Medication 1 CAPSULE: at 10:06

## 2020-03-06 RX ADMIN — GABAPENTIN 200 MG: 100 CAPSULE ORAL at 21:24

## 2020-03-06 RX ADMIN — CEPHALEXIN 500 MG: 500 CAPSULE ORAL at 10:05

## 2020-03-06 RX ADMIN — CLONAZEPAM 0.5 MG: 0.5 TABLET ORAL at 21:24

## 2020-03-06 RX ADMIN — BENZTROPINE MESYLATE 2 MG: 1 TABLET ORAL at 10:05

## 2020-03-06 RX ADMIN — CETIRIZINE HYDROCHLORIDE 5 MG: 10 TABLET ORAL at 10:05

## 2020-03-06 NOTE — BH NOTE
Dr. Veronica Bustamante notified about sepsis flag. Patient current presentation not displaying septic signs, flag was based off hx of illness mostly. No further action needed at this time. Verified Results  XR CHEST PA, LATERAL 2V [id number] 04:19PM [place]   [May 3, 2017 8:38AM JOE PADILLA]  Your chest xray is stable. Test Name Result Flag Reference   XR CHEST PA, LATERAL 2V (Report)     Accession #    [id number]    EXAM: XR CHEST PA, LATERAL 2V    CLINICAL INDICATION: Cough. Tobacco user. COMPARISON: CV chest 07/24/2016. Chest radiographic series 07/23/2016. TECHNIQUE: PA and lateral views. FINDINGS:  The lungs are clear and fully expanded. Costophrenic angles are sharp. No mediastinal or hilar   enlargement. Heart size and pulmonary vasculature are within normal limits. The aorta is mildly elongated and   calcified. [place] right hemidiaphragm elevation is unchanged, an air-filled transverse colon resting beneath   it, similar in appearance. 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 Vestigial variant left 1st rib. Dental dextroscoliotic thoracic curvature. Spondylitic disc disease thoracic spine. IMPRESSION: Stable chest radiographic series. No active cardiac or pulmonary disease.     **** F I N A L ****    Transcribed By: JUSTIN   05/03/17 7:55 am    Dictated By:      Savannah Cope MD    Electronically Reviewed and Approved By:      Savannah Cope MD 05/03/17 7:58   am

## 2020-03-07 LAB
ANION GAP SERPL CALCULATED.3IONS-SCNC: 16 MMOL/L (ref 3–16)
BUN BLDV-MCNC: 45 MG/DL (ref 7–20)
CALCIUM SERPL-MCNC: 8.4 MG/DL (ref 8.3–10.6)
CHLORIDE BLD-SCNC: 102 MMOL/L (ref 99–110)
CO2: 20 MMOL/L (ref 21–32)
CREAT SERPL-MCNC: 2.6 MG/DL (ref 0.9–1.3)
GFR AFRICAN AMERICAN: 31
GFR NON-AFRICAN AMERICAN: 26
GLUCOSE BLD-MCNC: 170 MG/DL (ref 70–99)
POTASSIUM REFLEX MAGNESIUM: 4.9 MMOL/L (ref 3.5–5.1)
SODIUM BLD-SCNC: 138 MMOL/L (ref 136–145)

## 2020-03-07 PROCEDURE — 1240000000 HC EMOTIONAL WELLNESS R&B

## 2020-03-07 PROCEDURE — 6370000000 HC RX 637 (ALT 250 FOR IP): Performed by: PSYCHIATRY & NEUROLOGY

## 2020-03-07 PROCEDURE — 6370000000 HC RX 637 (ALT 250 FOR IP): Performed by: NURSE PRACTITIONER

## 2020-03-07 PROCEDURE — 80048 BASIC METABOLIC PNL TOTAL CA: CPT

## 2020-03-07 PROCEDURE — 99233 SBSQ HOSP IP/OBS HIGH 50: CPT | Performed by: PSYCHIATRY & NEUROLOGY

## 2020-03-07 PROCEDURE — 36415 COLL VENOUS BLD VENIPUNCTURE: CPT

## 2020-03-07 RX ORDER — CLONAZEPAM 1 MG/1
1 TABLET ORAL ONCE
Status: COMPLETED | OUTPATIENT
Start: 2020-03-07 | End: 2020-03-07

## 2020-03-07 RX ADMIN — Medication 1 CAPSULE: at 08:56

## 2020-03-07 RX ADMIN — PANTOPRAZOLE SODIUM 40 MG: 40 TABLET, DELAYED RELEASE ORAL at 06:03

## 2020-03-07 RX ADMIN — CLONAZEPAM 0.5 MG: 0.5 TABLET ORAL at 08:55

## 2020-03-07 RX ADMIN — TAMSULOSIN HYDROCHLORIDE 0.4 MG: 0.4 CAPSULE ORAL at 08:55

## 2020-03-07 RX ADMIN — PROPRANOLOL HYDROCHLORIDE 20 MG: 10 TABLET ORAL at 20:54

## 2020-03-07 RX ADMIN — OLANZAPINE 10 MG: 10 TABLET, FILM COATED ORAL at 08:56

## 2020-03-07 RX ADMIN — FLUPHENAZINE HYDROCHLORIDE 10 MG: 10 TABLET, FILM COATED ORAL at 08:56

## 2020-03-07 RX ADMIN — CEPHALEXIN 250 MG: 250 CAPSULE ORAL at 20:54

## 2020-03-07 RX ADMIN — CETIRIZINE HYDROCHLORIDE 5 MG: 10 TABLET ORAL at 08:55

## 2020-03-07 RX ADMIN — PROPRANOLOL HYDROCHLORIDE 20 MG: 10 TABLET ORAL at 08:55

## 2020-03-07 RX ADMIN — CLONAZEPAM 1 MG: 1 TABLET ORAL at 16:30

## 2020-03-07 RX ADMIN — GABAPENTIN 200 MG: 100 CAPSULE ORAL at 08:55

## 2020-03-07 RX ADMIN — GABAPENTIN 200 MG: 100 CAPSULE ORAL at 13:47

## 2020-03-07 RX ADMIN — CLONAZEPAM 0.5 MG: 0.5 TABLET ORAL at 20:54

## 2020-03-07 RX ADMIN — BENZTROPINE MESYLATE 2 MG: 1 TABLET ORAL at 08:56

## 2020-03-07 RX ADMIN — BENZTROPINE MESYLATE 2 MG: 1 TABLET ORAL at 20:54

## 2020-03-07 RX ADMIN — GABAPENTIN 200 MG: 100 CAPSULE ORAL at 20:54

## 2020-03-07 RX ADMIN — FENOFIBRATE 160 MG: 160 TABLET ORAL at 08:55

## 2020-03-07 RX ADMIN — CEPHALEXIN 250 MG: 250 CAPSULE ORAL at 08:55

## 2020-03-07 NOTE — PLAN OF CARE
Problem: Pain:  Goal: Pain level will decrease  Description  Pain level will decrease  Outcome: Ongoing     Problem: Anger Management/Homicidal Ideation:  Goal: Able to display appropriate communication and problem solving  Description  Able to display appropriate communication and problem solving  Outcome: Ongoing     Problem: Anger Management/Homicidal Ideation:  Goal: Ability to verbalize frustrations and anger appropriately will improve  Description  Ability to verbalize frustrations and anger appropriately will improve  Outcome: Ongoing     Problem: Depressive Behavior With or Without Suicide Precautions:  Goal: Able to verbalize and/or display a decrease in depressive symptoms  Description  Able to verbalize and/or display a decrease in depressive symptoms  3/6/2020 2246 by Ash Isabel RN  Outcome: Ongoing    Problem: Altered Mood, Deterioration in Function:  Goal: Ability to perform activities of daily living will improve  Description  Ability to perform activities of daily living will improve  Outcome: Ongoing  Patient has been isolative to room this shift. Alert and oriented x 3, cooperative with interview but irritable at times. During interview writer asked patient how he was doing and patient responded \"you wouldn't understand if I try to explain. \" Encouraged patient that he could share his thoughts but he didn't wish too. Patient withdrawn with flat affect. Currently denies SI/HI or AVH. He reports that he does feel anxious and made him aware that he receives routine Klonopin with hs medications and he responded \"it probably won't do anything. \" Discussed IV fluids with patient that were discussed with previous shift and patient continues to refuse at this time. Accepted night medications without issues but did hold Inderal due to BP of 102/56. Currently resting in bed at this time, denies any issues.

## 2020-03-07 NOTE — PROGRESS NOTES
% Final    Eosinophils % 02/27/2020 7.4  % Final    Basophils % 02/27/2020 0.7  % Final    Neutrophils Absolute 02/27/2020 4.4  1.7 - 7.7 K/uL Final    Lymphocytes Absolute 02/27/2020 3.0  1.0 - 5.1 K/uL Final    Monocytes Absolute 02/27/2020 0.7  0.0 - 1.3 K/uL Final    Eosinophils Absolute 02/27/2020 0.7* 0.0 - 0.6 K/uL Final    Basophils Absolute 02/27/2020 0.1  0.0 - 0.2 K/uL Final    Sodium 02/27/2020 140  136 - 145 mmol/L Final    Potassium 02/27/2020 3.2* 3.5 - 5.1 mmol/L Final    Chloride 02/27/2020 102  99 - 110 mmol/L Final    CO2 02/27/2020 22  21 - 32 mmol/L Final    Anion Gap 02/27/2020 16  3 - 16 Final    Glucose 02/27/2020 117* 70 - 99 mg/dL Final    BUN 02/27/2020 14  7 - 20 mg/dL Final    CREATININE 02/27/2020 1.4* 0.9 - 1.3 mg/dL Final    GFR Non- 02/27/2020 52* >60 Final    Comment: >60 mL/min/1.73m2 EGFR, calc. for ages 25 and older using the  MDRD formula (not corrected for weight), is valid for stable  renal function.  GFR  02/27/2020 >60  >60 Final    Comment: Chronic Kidney Disease: less than 60 ml/min/1.73 sq.m. Kidney Failure: less than 15 ml/min/1.73 sq.m. Results valid for patients 18 years and older.       Calcium 02/27/2020 8.7  8.3 - 10.6 mg/dL Final    Total Protein 02/27/2020 6.8  6.4 - 8.2 g/dL Final    Alb 02/27/2020 3.8  3.4 - 5.0 g/dL Final    Albumin/Globulin Ratio 02/27/2020 1.3  1.1 - 2.2 Final    Total Bilirubin 02/27/2020 <0.2  0.0 - 1.0 mg/dL Final    Alkaline Phosphatase 02/27/2020 83  40 - 129 U/L Final    ALT 02/27/2020 8* 10 - 40 U/L Final    AST 02/27/2020 12* 15 - 37 U/L Final    Globulin 02/27/2020 3.0  g/dL Final    Troponin 02/27/2020 <0.01  <0.01 ng/mL Final    Methodology by Troponin T    Total CK 02/27/2020 167  39 - 308 U/L Final    Ventricular Rate 02/27/2020 92  BPM Final    Atrial Rate 02/27/2020 92  BPM Final    P-R Interval 02/27/2020 124  ms Final    QRS Duration 02/27/2020 100 BID PRN  magnesium hydroxide (MILK OF MAGNESIA) 400 MG/5ML suspension 30 mL, 30 mL, Oral, Daily PRN  aluminum & magnesium hydroxide-simethicone (MAALOX) 200-200-20 MG/5ML suspension 30 mL, 30 mL, Oral, Q6H PRN  nicotine (NICODERM CQ) 21 MG/24HR 1 patch, 1 patch, Transdermal, Daily  traZODone (DESYREL) tablet 50 mg, 50 mg, Oral, Nightly PRN    ASSESSMENT AND PLAN    Active Problems:    CKD (chronic kidney disease) stage 3, GFR 30-59 ml/min ([facility])    Hypertension, essential    Schizo affective schizophrenia ([facility])    Hypokalemia    Paronychia of left thumb  Resolved Problems:    * No resolved hospital problems. *       1. Patient s symptoms   show no change  2. Probable discharge is next week  3. Discharge planning is incomplete  4. Suicidal ideation is better  5. Total time with patient was 40 minutes and more than 50 % of that time was spent counseling the patient on their symptoms, treatment and expected goals.

## 2020-03-08 LAB
A/G RATIO: 1.2 (ref 1.1–2.2)
ALBUMIN SERPL-MCNC: 3.5 G/DL (ref 3.4–5)
ALP BLD-CCNC: 83 U/L (ref 40–129)
ALT SERPL-CCNC: 8 U/L (ref 10–40)
ANION GAP SERPL CALCULATED.3IONS-SCNC: 12 MMOL/L (ref 3–16)
AST SERPL-CCNC: 7 U/L (ref 15–37)
BILIRUB SERPL-MCNC: <0.2 MG/DL (ref 0–1)
BUN BLDV-MCNC: 49 MG/DL (ref 7–20)
CALCIUM SERPL-MCNC: 8.9 MG/DL (ref 8.3–10.6)
CHLORIDE BLD-SCNC: 104 MMOL/L (ref 99–110)
CO2: 21 MMOL/L (ref 21–32)
CREAT SERPL-MCNC: 2.7 MG/DL (ref 0.9–1.3)
GFR AFRICAN AMERICAN: 30
GFR NON-AFRICAN AMERICAN: 25
GLOBULIN: 3 G/DL
GLUCOSE BLD-MCNC: 146 MG/DL (ref 70–99)
POTASSIUM SERPL-SCNC: 4.8 MMOL/L (ref 3.5–5.1)
SODIUM BLD-SCNC: 137 MMOL/L (ref 136–145)
TOTAL PROTEIN: 6.5 G/DL (ref 6.4–8.2)

## 2020-03-08 PROCEDURE — 80053 COMPREHEN METABOLIC PANEL: CPT

## 2020-03-08 PROCEDURE — 6370000000 HC RX 637 (ALT 250 FOR IP): Performed by: NURSE PRACTITIONER

## 2020-03-08 PROCEDURE — 36415 COLL VENOUS BLD VENIPUNCTURE: CPT

## 2020-03-08 PROCEDURE — 1240000000 HC EMOTIONAL WELLNESS R&B

## 2020-03-08 PROCEDURE — 6370000000 HC RX 637 (ALT 250 FOR IP): Performed by: PSYCHIATRY & NEUROLOGY

## 2020-03-08 RX ADMIN — GABAPENTIN 200 MG: 100 CAPSULE ORAL at 09:38

## 2020-03-08 RX ADMIN — CEPHALEXIN 250 MG: 250 CAPSULE ORAL at 09:38

## 2020-03-08 RX ADMIN — CLONAZEPAM 0.5 MG: 0.5 TABLET ORAL at 09:38

## 2020-03-08 RX ADMIN — BENZTROPINE MESYLATE 2 MG: 1 TABLET ORAL at 20:37

## 2020-03-08 RX ADMIN — PROPRANOLOL HYDROCHLORIDE 20 MG: 10 TABLET ORAL at 20:37

## 2020-03-08 RX ADMIN — Medication 1 CAPSULE: at 09:39

## 2020-03-08 RX ADMIN — FLUPHENAZINE HYDROCHLORIDE 10 MG: 10 TABLET, FILM COATED ORAL at 09:39

## 2020-03-08 RX ADMIN — GABAPENTIN 200 MG: 100 CAPSULE ORAL at 13:41

## 2020-03-08 RX ADMIN — TAMSULOSIN HYDROCHLORIDE 0.4 MG: 0.4 CAPSULE ORAL at 09:39

## 2020-03-08 RX ADMIN — CLONAZEPAM 0.5 MG: 0.5 TABLET ORAL at 20:37

## 2020-03-08 RX ADMIN — GABAPENTIN 200 MG: 100 CAPSULE ORAL at 20:37

## 2020-03-08 RX ADMIN — FENOFIBRATE 160 MG: 160 TABLET ORAL at 09:37

## 2020-03-08 RX ADMIN — BENZTROPINE MESYLATE 2 MG: 1 TABLET ORAL at 09:39

## 2020-03-08 RX ADMIN — TRAZODONE HYDROCHLORIDE 50 MG: 50 TABLET ORAL at 22:16

## 2020-03-08 RX ADMIN — CETIRIZINE HYDROCHLORIDE 5 MG: 10 TABLET ORAL at 09:38

## 2020-03-08 RX ADMIN — PROPRANOLOL HYDROCHLORIDE 20 MG: 10 TABLET ORAL at 09:37

## 2020-03-08 RX ADMIN — OLANZAPINE 10 MG: 10 TABLET, FILM COATED ORAL at 09:38

## 2020-03-08 NOTE — PLAN OF CARE
Pt alert, isolative to room, refuses SI, HI, and AVH, PT irritable at times, but cooperative with care.

## 2020-03-08 NOTE — PLAN OF CARE
Pt has been isolative to bed all herman. Did not attend group. Allowed vitals and took hs meds with [employer]. Denied SI/HI. Denied hallucinations, paranoia. Knew date. Asked for/ate snack. Went back to sleep. Did not wish to talk but did answer questions with yes or no responses.

## 2020-03-09 LAB
ANION GAP SERPL CALCULATED.3IONS-SCNC: 15 MMOL/L (ref 3–16)
BUN BLDV-MCNC: 46 MG/DL (ref 7–20)
CALCIUM SERPL-MCNC: 9.3 MG/DL (ref 8.3–10.6)
CHLORIDE BLD-SCNC: 103 MMOL/L (ref 99–110)
CO2: 22 MMOL/L (ref 21–32)
CREAT SERPL-MCNC: 2.4 MG/DL (ref 0.9–1.3)
GFR AFRICAN AMERICAN: 34
GFR NON-AFRICAN AMERICAN: 28
GLUCOSE BLD-MCNC: 113 MG/DL (ref 70–99)
POTASSIUM SERPL-SCNC: 5.3 MMOL/L (ref 3.5–5.1)
SODIUM BLD-SCNC: 140 MMOL/L (ref 136–145)

## 2020-03-09 PROCEDURE — 99233 SBSQ HOSP IP/OBS HIGH 50: CPT | Performed by: PSYCHIATRY & NEUROLOGY

## 2020-03-09 PROCEDURE — 6370000000 HC RX 637 (ALT 250 FOR IP): Performed by: NURSE PRACTITIONER

## 2020-03-09 PROCEDURE — 80048 BASIC METABOLIC PNL TOTAL CA: CPT

## 2020-03-09 PROCEDURE — 1240000000 HC EMOTIONAL WELLNESS R&B

## 2020-03-09 PROCEDURE — 36415 COLL VENOUS BLD VENIPUNCTURE: CPT

## 2020-03-09 PROCEDURE — 6370000000 HC RX 637 (ALT 250 FOR IP): Performed by: PSYCHIATRY & NEUROLOGY

## 2020-03-09 PROCEDURE — 99232 SBSQ HOSP IP/OBS MODERATE 35: CPT | Performed by: NURSE PRACTITIONER

## 2020-03-09 RX ORDER — SODIUM CHLORIDE 9 MG/ML
INJECTION, SOLUTION INTRAVENOUS CONTINUOUS
Status: DISPENSED | OUTPATIENT
Start: 2020-03-09 | End: 2020-03-09

## 2020-03-09 RX ADMIN — FENOFIBRATE 160 MG: 160 TABLET ORAL at 08:20

## 2020-03-09 RX ADMIN — Medication 1 CAPSULE: at 08:25

## 2020-03-09 RX ADMIN — TAMSULOSIN HYDROCHLORIDE 0.4 MG: 0.4 CAPSULE ORAL at 08:19

## 2020-03-09 RX ADMIN — BENZTROPINE MESYLATE 2 MG: 1 TABLET ORAL at 21:40

## 2020-03-09 RX ADMIN — FLUPHENAZINE HYDROCHLORIDE 10 MG: 10 TABLET, FILM COATED ORAL at 08:20

## 2020-03-09 RX ADMIN — GABAPENTIN 200 MG: 100 CAPSULE ORAL at 08:20

## 2020-03-09 RX ADMIN — PROPRANOLOL HYDROCHLORIDE 20 MG: 10 TABLET ORAL at 08:24

## 2020-03-09 RX ADMIN — CLONAZEPAM 0.5 MG: 0.5 TABLET ORAL at 21:40

## 2020-03-09 RX ADMIN — CETIRIZINE HYDROCHLORIDE 5 MG: 10 TABLET ORAL at 08:25

## 2020-03-09 RX ADMIN — CLONAZEPAM 0.5 MG: 0.5 TABLET ORAL at 08:19

## 2020-03-09 RX ADMIN — BENZTROPINE MESYLATE 2 MG: 1 TABLET ORAL at 08:19

## 2020-03-09 RX ADMIN — PROPRANOLOL HYDROCHLORIDE 20 MG: 10 TABLET ORAL at 21:40

## 2020-03-09 RX ADMIN — OLANZAPINE 10 MG: 10 TABLET, FILM COATED ORAL at 08:20

## 2020-03-09 ASSESSMENT — PAIN SCALES - GENERAL: PAINLEVEL_OUTOF10: 0

## 2020-03-09 NOTE — BH NOTE
Per Dr. Hawa Borrego, Perfect Serve sent to Lorie DORADO regarding patient's worsening kidney function.

## 2020-03-09 NOTE — CONSULTS
of clubs or organizations: None     Relationship status: None    Intimate partner violence     Fear of current or ex partner: None     Emotionally abused: None     Physically abused: None     Forced sexual activity: None   Other Topics Concern    None   Social History Narrative    None       Physical Exam     Blood pressure 131/72, pulse 109, temperature 98 °F (36.7 °C), temperature source Oral, resp. rate 16, height 5' 4\" (1.626 m), weight 142 lb (64.4 kg), SpO2 95 %. General:  NAD  HEENT:  PERRL, EOMI  Neck:  Supple, normal range of movement  Chest:  CTAB, good respiratory effort, good air movement  CV:  Regular, no rub   Abdomen:  NTND, soft, +BS, no hepatosplenomegaly  Extremities:  No peripheral edema  Neurological:  Moving all four extremities, CN II-XII grossly intact  Lymphatics:  No palpable lymph nodes  Skin:  No rash, no jaundice  Psychiatric:  Awake, odd affect, paranoid     Data     No results for input(s): WBC, HGB, HCT, MCV, PLT in the last 72 hours. Recent Labs     03/07/20  1604 03/08/20  0959 03/09/20  1045    137 140   K 4.9 4.8 5.3*    104 103   CO2 20* 21 22   GLUCOSE 170* 146* 113*   BUN 45* 49* 46*   CREATININE 2.6* 2.7* 2.4*   LABGLOM 26* 25* 28*   GFRAA 31* 30* 34*       Assessment:    Acute Kidney Injury:  KDIGO stage 1  - Etiology:  Most likely volume depletion  - Baseline:  1.4 - 2.0 since 2015. Renal ultrasound reviewed. He denies issues with urination     Chronic Kidney Disease:  Stage 3/4  - Baseline 1.4 - 2.0     Hyperkalemia:  Likely related to volume depletion and worsening of renal function with limited potassium excretion     Psychosis:  Admitted to behavioral unit.   Not wanting to get IV fluids or take treatments for potassium     Plan:    Follow labs as able  Discussed renal function with patient  Advised drinking water and staying hydrated would be helpful to his kidney function  He seems concerned about kidneys but yet refused IV fluids or additional testing  Will follow along and help as able. Medications reviewed. Aside from volume depletion his UA was bland and his underlying CKD should not be this progressive.     Gabapentin has increased risk for side effects with CKD but typically low dose up to 300 mg daily is tolerated in CKD 4 patients or patients on dialysis     Thank you for asking us to participate in the management of your patient, please do not hesitate to contact me for any concerns regarding my recommendations as outlined above.    -----------------------------  Pradeep Obando M.D.   [facility]

## 2020-03-09 NOTE — PLAN OF CARE
Problem: Altered Mood, Psychotic Behavior:  Goal: Ability to interact with others will improve  Description: Ability to interact with others will improve  Outcome: Not Met This Shift   Patient has been mostly regressed to room since start of shift, except to obtain meal trays, snacks, and to make phone calls. He has been reclusive to self since admission. He was compliant with taking medications when offered snack or beverage. He continues to refuse vitals and refused IV fluids. Patient encouraged to shower, but refused. He as not attended any groups since admission. He continues to be evasive and non-compliant with staff directions. Will continue to monitor for safety.

## 2020-03-10 LAB
ANION GAP SERPL CALCULATED.3IONS-SCNC: 13 MMOL/L (ref 3–16)
BUN BLDV-MCNC: 43 MG/DL (ref 7–20)
CALCIUM SERPL-MCNC: 9.2 MG/DL (ref 8.3–10.6)
CHLORIDE BLD-SCNC: 104 MMOL/L (ref 99–110)
CO2: 19 MMOL/L (ref 21–32)
CREAT SERPL-MCNC: 2.6 MG/DL (ref 0.9–1.3)
GFR AFRICAN AMERICAN: 31
GFR NON-AFRICAN AMERICAN: 26
GLUCOSE BLD-MCNC: 177 MG/DL (ref 70–99)
POTASSIUM REFLEX MAGNESIUM: 5.3 MMOL/L (ref 3.5–5.1)
SODIUM BLD-SCNC: 136 MMOL/L (ref 136–145)

## 2020-03-10 PROCEDURE — 6370000000 HC RX 637 (ALT 250 FOR IP): Performed by: NURSE PRACTITIONER

## 2020-03-10 PROCEDURE — 6370000000 HC RX 637 (ALT 250 FOR IP): Performed by: INTERNAL MEDICINE

## 2020-03-10 PROCEDURE — 6370000000 HC RX 637 (ALT 250 FOR IP): Performed by: PSYCHIATRY & NEUROLOGY

## 2020-03-10 PROCEDURE — 80048 BASIC METABOLIC PNL TOTAL CA: CPT

## 2020-03-10 PROCEDURE — 1240000000 HC EMOTIONAL WELLNESS R&B

## 2020-03-10 PROCEDURE — 99233 SBSQ HOSP IP/OBS HIGH 50: CPT | Performed by: PSYCHIATRY & NEUROLOGY

## 2020-03-10 PROCEDURE — 36415 COLL VENOUS BLD VENIPUNCTURE: CPT

## 2020-03-10 RX ADMIN — OLANZAPINE 10 MG: 10 TABLET, FILM COATED ORAL at 09:24

## 2020-03-10 RX ADMIN — CLONAZEPAM 0.5 MG: 0.5 TABLET ORAL at 09:25

## 2020-03-10 RX ADMIN — CLONAZEPAM 0.5 MG: 0.5 TABLET ORAL at 20:54

## 2020-03-10 RX ADMIN — PROPRANOLOL HYDROCHLORIDE 20 MG: 10 TABLET ORAL at 20:54

## 2020-03-10 RX ADMIN — SODIUM ZIRCONIUM CYCLOSILICATE 10 G: 10 POWDER, FOR SUSPENSION ORAL at 20:54

## 2020-03-10 RX ADMIN — PANTOPRAZOLE SODIUM 40 MG: 40 TABLET, DELAYED RELEASE ORAL at 06:18

## 2020-03-10 RX ADMIN — ALUMINUM HYDROXIDE, MAGNESIUM HYDROXIDE, AND SIMETHICONE 30 ML: 200; 200; 20 SUSPENSION ORAL at 12:53

## 2020-03-10 RX ADMIN — Medication 1 CAPSULE: at 09:24

## 2020-03-10 RX ADMIN — BENZTROPINE MESYLATE 2 MG: 1 TABLET ORAL at 20:55

## 2020-03-10 RX ADMIN — FLUPHENAZINE HYDROCHLORIDE 10 MG: 10 TABLET, FILM COATED ORAL at 09:24

## 2020-03-10 RX ADMIN — PROPRANOLOL HYDROCHLORIDE 20 MG: 10 TABLET ORAL at 09:25

## 2020-03-10 RX ADMIN — FENOFIBRATE 160 MG: 160 TABLET ORAL at 09:24

## 2020-03-10 RX ADMIN — ALUMINUM HYDROXIDE, MAGNESIUM HYDROXIDE, AND SIMETHICONE 30 ML: 200; 200; 20 SUSPENSION ORAL at 21:48

## 2020-03-10 RX ADMIN — SODIUM ZIRCONIUM CYCLOSILICATE 10 G: 10 POWDER, FOR SUSPENSION ORAL at 15:03

## 2020-03-10 RX ADMIN — CETIRIZINE HYDROCHLORIDE 5 MG: 10 TABLET ORAL at 09:24

## 2020-03-10 RX ADMIN — ALUMINUM HYDROXIDE, MAGNESIUM HYDROXIDE, AND SIMETHICONE 30 ML: 200; 200; 20 SUSPENSION ORAL at 00:34

## 2020-03-10 RX ADMIN — TAMSULOSIN HYDROCHLORIDE 0.4 MG: 0.4 CAPSULE ORAL at 09:24

## 2020-03-10 RX ADMIN — BENZTROPINE MESYLATE 2 MG: 1 TABLET ORAL at 09:24

## 2020-03-10 NOTE — BH NOTE
Patient given Debera Pean per order with encouragement. Patient stated I just want to die when I go home\" and return home. Talked to patient in room. Patient denies SI/HI or wanting to die \" I was angry.

## 2020-03-10 NOTE — PROGRESS NOTES
Department of Psychiatry  Progress Note     Patient's chart was reviewed. Discussed with treatment team. Met with patient.      Chief Complaint: follow-up    Pt stated that he is not feeling well in general but could not elaborate. Pt repeated several times \"I want to go. I want to leave now. Please let me go. \"     Passr has been denied. Will discuss to see if we can appeal. We cont to discussed the possibility of nursing home or ecf. CM should present to day to meet with pt regarding [place] [facility] [place]. Pt is difficult to engage but he will if push a little. He reports voices are better and no longer commanding in nature. Pt refuse LE. Refused IV fluids yesterday and not willing today. Discussed that having the conversation with his CM today will help move forward his discharge. Asked pt to please engage when CM arrives. With several explanations pt verbalized he would speak with CM when they arrived to help facilitate discharge.      SUBJECTIVE:      Patient is feeling unchanged. Suicidal ideation:  denies suicidal ideation.    Patient does not have medication side effects.    ROS: Patient has new complaints: no  Sleeping adequately:  Yes   Appetite adequate: Yes  Attending groups: No: isolative in room due to psychosis and illness  Visitors: No     VITALS:  /84   Pulse 67   Temp 97.5 °F (36.4 °C) (Oral)   Resp 16   Ht 5' 4\" (1.626 m)   Wt 142 lb (64.4 kg)   SpO2 95%   BMI 24.37 kg/m²   TEMPERATURE:  Current - Temp: 97.5 °F (36.4 °C);  Max - Temp  Av.5 °F (36.4 °C)  Min: 97.5 °F (36.4 °C)  Max: 97.5 °F (36.4 °C)  RESPIRATIONS RANGE: Resp  Av  Min: 16  Max: 16  PULSE RANGE: Pulse  Av.3  Min: 67  Max: 91  BLOOD PRESSURE RANGE:  Systolic (16CMW), MVJ:751 , Min:122 , STOKES:790   ; Diastolic (37VHH), MNT:24, Min:59, Max:84    Mental Status Examination:  Level of consciousness:  Mild dysattention (reduced clarity of awareness with impaired ability to focus, sustain, or shift attention)  Appearance:  ill-appearing, hospital attire, poor grooming, poor hygiene and standing in room shifting weight from one foot to other, inability to stand still, quickly lay down in bed and quickly get up   Behavior/Motor:  psychomotor agitation  Attitude toward examiner:  good eye contact, guarded and argumentative  Speech:  spontaneous, normal rate and normal volume  Mood:  \"I want to go. I want to go now. I want to go now. \"  Affect:  anxious  Thought processes:  goal directed, coherent and poverty of thought  Thought content:  Preoccupied with \"I want to go home now. \"  Homocidal ideation denies  Suicidal Ideation:  denies suicidal ideation  Delusions:  no evidence of delusions  Perceptual Disturbance:  denies any perceptual disturbance  Cognition:  Insight:  poor  Judgment:  poor      Labs:    CMP:    Lab Results   Component Value Date     03/09/2020    K 5.3 03/09/2020    K 4.9 03/07/2020     03/09/2020    CO2 22 03/09/2020    BUN 46 03/09/2020    CREATININE 2.4 03/09/2020    GFRAA 34 03/09/2020    GFRAA >60 02/09/2013    AGRATIO 1.2 03/08/2020    LABGLOM 28 03/09/2020    GLUCOSE 113 03/09/2020    PROT 6.5 03/08/2020    PROT 6.4 02/09/2013    LABALBU 3.5 03/08/2020    CALCIUM 9.3 03/09/2020    BILITOT <0.2 03/08/2020    ALKPHOS 83 03/08/2020    AST 7 03/08/2020    ALT 8 03/08/2020     BMP:    Lab Results   Component Value Date     03/09/2020    K 5.3 03/09/2020    K 4.9 03/07/2020     03/09/2020    CO2 22 03/09/2020    BUN 46 03/09/2020    LABALBU 3.5 03/08/2020    CREATININE 2.4 03/09/2020    CALCIUM 9.3 03/09/2020    GFRAA 34 03/09/2020    GFRAA >60 02/09/2013    LABGLOM 28 03/09/2020    GLUCOSE 113 03/09/2020     Medication:    Scheduled Medications    fluPHENAZine HCl  10 mg Oral Daily    OLANZapine  10 mg Oral Daily    clonazePAM  0.5 mg Oral BID    lactobacillus  1 capsule Oral Daily with breakfast    cetirizine  5 mg Oral Daily    pantoprazole  40 mg Oral QAM AC   

## 2020-03-10 NOTE — PLAN OF CARE
Patient visible on unit this am, focused on anxiety and needing meds that work. Accepted po meds with encouragement. Denies SI/HI/AVH. Frequently asking when he can d/c. Patient reassured that we are working on d/c and encouraged to discuss discharge plans with  when they come in. Cooperative with blood draw with encouragement. Patient out eating lunch. Will monitor.

## 2020-03-10 NOTE — PROGRESS NOTES
Occupational Therapy  Pt refused skilled OT services on this date even with coaxing and encouragement, \"I don't want to talk right now,\" Will follow-up when time permits and/or pending on level of participation.     Thank you,  Yesica Koroma, OTR/L  #[id number]

## 2020-03-11 VITALS
HEART RATE: 92 BPM | SYSTOLIC BLOOD PRESSURE: 122 MMHG | HEIGHT: 64 IN | RESPIRATION RATE: 16 BRPM | WEIGHT: 142 LBS | DIASTOLIC BLOOD PRESSURE: 72 MMHG | TEMPERATURE: 97.6 F | BODY MASS INDEX: 24.24 KG/M2 | OXYGEN SATURATION: 97 %

## 2020-03-11 PROCEDURE — 6370000000 HC RX 637 (ALT 250 FOR IP): Performed by: NURSE PRACTITIONER

## 2020-03-11 PROCEDURE — 6370000000 HC RX 637 (ALT 250 FOR IP): Performed by: PSYCHIATRY & NEUROLOGY

## 2020-03-11 PROCEDURE — 99239 HOSP IP/OBS DSCHRG MGMT >30: CPT | Performed by: PSYCHIATRY & NEUROLOGY

## 2020-03-11 PROCEDURE — 5130000000 HC BRIDGE APPOINTMENT

## 2020-03-11 PROCEDURE — 6370000000 HC RX 637 (ALT 250 FOR IP): Performed by: INTERNAL MEDICINE

## 2020-03-11 RX ORDER — CLONAZEPAM 0.5 MG/1
0.5 TABLET ORAL 2 TIMES DAILY
Qty: 30 TABLET | Refills: 0 | Status: ON HOLD | OUTPATIENT
Start: 2020-03-11 | End: 2022-01-01

## 2020-03-11 RX ORDER — FLUPHENAZINE HYDROCHLORIDE 10 MG/1
10 TABLET ORAL DAILY
Qty: 30 TABLET | Refills: 0 | Status: ON HOLD | OUTPATIENT
Start: 2020-03-12 | End: 2022-01-01

## 2020-03-11 RX ORDER — OLANZAPINE 10 MG/1
10 TABLET ORAL DAILY
Qty: 30 TABLET | Refills: 0 | Status: ON HOLD | OUTPATIENT
Start: 2020-03-12 | End: 2022-01-01

## 2020-03-11 RX ORDER — TAMSULOSIN HYDROCHLORIDE 0.4 MG/1
0.4 CAPSULE ORAL DAILY
Qty: 30 CAPSULE | Refills: 0 | Status: ON HOLD | OUTPATIENT
Start: 2020-03-12 | End: 2022-01-01

## 2020-03-11 RX ORDER — PROPRANOLOL HYDROCHLORIDE 20 MG/1
20 TABLET ORAL 2 TIMES DAILY
Qty: 60 TABLET | Refills: 0 | Status: ON HOLD | OUTPATIENT
Start: 2020-03-11 | End: 2022-01-01

## 2020-03-11 RX ORDER — FENOFIBRATE 160 MG/1
160 TABLET ORAL DAILY
Qty: 30 TABLET | Refills: 0 | Status: ON HOLD | OUTPATIENT
Start: 2020-03-12 | End: 2022-01-01

## 2020-03-11 RX ORDER — LACTOBACILLUS RHAMNOSUS GG 10B CELL
1 CAPSULE ORAL
Qty: 30 CAPSULE | Refills: 0 | Status: ON HOLD | OUTPATIENT
Start: 2020-03-12 | End: 2022-01-01

## 2020-03-11 RX ORDER — PANTOPRAZOLE SODIUM 40 MG/1
40 TABLET, DELAYED RELEASE ORAL
Qty: 30 TABLET | Refills: 0 | Status: ON HOLD | OUTPATIENT
Start: 2020-03-12 | End: 2022-01-01

## 2020-03-11 RX ADMIN — OLANZAPINE 10 MG: 10 TABLET, FILM COATED ORAL at 08:29

## 2020-03-11 RX ADMIN — ALUMINUM HYDROXIDE, MAGNESIUM HYDROXIDE, AND SIMETHICONE 30 ML: 200; 200; 20 SUSPENSION ORAL at 13:31

## 2020-03-11 RX ADMIN — FENOFIBRATE 160 MG: 160 TABLET ORAL at 08:29

## 2020-03-11 RX ADMIN — PANTOPRAZOLE SODIUM 40 MG: 40 TABLET, DELAYED RELEASE ORAL at 06:15

## 2020-03-11 RX ADMIN — Medication 1 CAPSULE: at 08:29

## 2020-03-11 RX ADMIN — TAMSULOSIN HYDROCHLORIDE 0.4 MG: 0.4 CAPSULE ORAL at 08:29

## 2020-03-11 RX ADMIN — CLONAZEPAM 0.5 MG: 0.5 TABLET ORAL at 08:29

## 2020-03-11 RX ADMIN — FLUPHENAZINE HYDROCHLORIDE 10 MG: 10 TABLET, FILM COATED ORAL at 08:29

## 2020-03-11 RX ADMIN — CETIRIZINE HYDROCHLORIDE 5 MG: 10 TABLET ORAL at 08:29

## 2020-03-11 RX ADMIN — SODIUM ZIRCONIUM CYCLOSILICATE 10 G: 10 POWDER, FOR SUSPENSION ORAL at 08:29

## 2020-03-11 RX ADMIN — BENZTROPINE MESYLATE 2 MG: 1 TABLET ORAL at 08:30

## 2020-03-11 RX ADMIN — PROPRANOLOL HYDROCHLORIDE 20 MG: 10 TABLET ORAL at 08:29

## 2020-03-11 RX ADMIN — SODIUM ZIRCONIUM CYCLOSILICATE 10 G: 10 POWDER, FOR SUSPENSION ORAL at 13:32

## 2020-03-11 NOTE — BH NOTE
plan using the teachback method.      Referral for Outpatient Tobacco Cessation Counseling, upon discharge (luis X if applicable and completed):    ( )  Hospital staff assisted patient to call Quit Line or faxed referral                                   during hospitalization                  ( )  Recognizing danger situations (included triggers and roadblocks), if not completed on admission                    ( )  Coping skills (new ways to manage stress, exercise, relaxation techniques, changing routine, distraction), if not completed on admission                                                           ( x)  Basic information about quitting (benefits of quitting, techniques in how to quit, available resources, if not completed on admission  ( ) Referral for counseling faxed to [facility]   ( ) Patient refused referral  (x ) Patient refused counseling  ( ) Patient refused smoking cessation medication upon discharge    Vaccinations (luis X if applicable and completed):  ( ) Patient states already received influenza vaccine elsewhere  ( ) Patient received influenza vaccine during this hospitalization  (x ) Patient refused influenza vaccine at this time    Eladia Duque RN

## 2020-03-11 NOTE — DISCHARGE SUMMARY
Negative Final    Leukocyte Esterase, Urine 02/27/2020 Negative  Negative Final    Microscopic Examination 02/27/2020 YES   Final    Urine Type 02/27/2020 NotGiven   Final    Urine received in a container without preservatives.  Urine Reflex to Culture 02/27/2020 Not Indicated   Final    Amphetamine Screen, Urine 02/27/2020 Neg  Negative <1000ng/mL Final    Barbiturate Screen, Ur 02/27/2020 Neg  Negative <200 ng/mL Final    Benzodiazepine Screen, Urine 02/27/2020 Neg  Negative <200 ng/mL Final    Cannabinoid Scrn, Ur 02/27/2020 Neg  Negative <50 ng/mL Final    Cocaine Metabolite Screen, Urine 02/27/2020 Neg  Negative <300 ng/mL Final    Opiate Scrn, Ur 02/27/2020 Neg  Negative <300 ng/mL Final    Comment: \"Therapeutic levels of pain medication, especially oxycontin and synthetic  opioids, may not be detected by this Methodology. Pain management screen  panel  Drug panel-PM-Hi Res Ur, Interp (PAIN) should be considered for drug  monitoring \".  PCP Screen, Urine 02/27/2020 Neg  Negative <25 ng/mL Final    Methadone Screen, Urine 02/27/2020 Neg  Negative <300 ng/mL Final    Propoxyphene Scrn, Ur 02/27/2020 Neg  Negative <300 ng/mL Final    Oxycodone Urine 02/27/2020 Neg  Negative <100 ng/ml Final    pH, UA 02/27/2020 6.0   Final    Comment: Urine pH less than 5.0 or greater than 8.0 may indicate sample adulteration. Another sample should be collected if clinically  indicated.  Drug Screen Comment: 02/27/2020 see below   Final    Comment: This method is a screening test to detect only these drug  classes as part of a medical workup. Confirmatory testing  by another method should be ordered if clinically indicated.       Mucus, UA 02/27/2020 Rare* None Seen /LPF Final    WBC, UA 02/27/2020 0-2  0 - 5 /HPF Final    RBC, UA 02/27/2020 None seen  0 - 4 /HPF Final    Bacteria, UA 02/27/2020 Rare* None Seen /HPF Final    TSH 02/27/2020 0.29  0.27 - 4.20 uIU/mL Final    Hemoglobin A1C 02/29/2020 5.6  See comment % Final    Comment: Comment:  Diagnosis of Diabetes: > or = 6.5%  Increased risk of diabetes (Prediabetes): 5.7-6.4%  Glycemic Control: Nonpregnant Adults: <7.0%                    Pregnant: <6.0%        eAG 02/29/2020 114.0  mg/dL Final    Cholesterol, Total 02/29/2020 135  0 - 199 mg/dL Final    Triglycerides 02/29/2020 70  0 - 150 mg/dL Final    HDL 02/29/2020 46  40 - 60 mg/dL Final    LDL Calculated 02/29/2020 75  <100 mg/dL Final    VLDL Cholesterol Calculated 02/29/2020 14  Not Established mg/dL Final    Sodium 03/02/2020 136  136 - 145 mmol/L Final    Potassium 03/02/2020 4.4  3.5 - 5.1 mmol/L Final    Chloride 03/02/2020 101  99 - 110 mmol/L Final    CO2 03/02/2020 21  21 - 32 mmol/L Final    Anion Gap 03/02/2020 14  3 - 16 Final    Glucose 03/02/2020 176* 70 - 99 mg/dL Final    BUN 03/02/2020 23* 7 - 20 mg/dL Final    CREATININE 03/02/2020 1.7* 0.9 - 1.3 mg/dL Final    GFR Non- 03/02/2020 42* >60 Final    Comment: >60 mL/min/1.73m2 EGFR, calc. for ages 25 and older using the  MDRD formula (not corrected for weight), is valid for stable  renal function.  GFR  03/02/2020 51* >60 Final    Comment: Chronic Kidney Disease: less than 60 ml/min/1.73 sq.m. Kidney Failure: less than 15 ml/min/1.73 sq.m. Results valid for patients 18 years and older.       Calcium 03/02/2020 9.0  8.3 - 10.6 mg/dL Final    WBC 03/02/2020 8.6  4.0 - 11.0 K/uL Final    RBC 03/02/2020 3.43* 4.20 - 5.90 M/uL Final    Hemoglobin 03/02/2020 10.7* 13.5 - 17.5 g/dL Final    Hematocrit 03/02/2020 32.6* 40.5 - 52.5 % Final    MCV 03/02/2020 95.2  80.0 - 100.0 fL Final    MCH 03/02/2020 31.2  26.0 - 34.0 pg Final    MCHC 03/02/2020 32.8  31.0 - 36.0 g/dL Final    RDW 03/02/2020 13.6  12.4 - 15.4 % Final    Platelets 90/22/0347 302  135 - 450 K/uL Final    MPV 03/02/2020 7.2  5.0 - 10.5 fL Final    Sodium 03/06/2020 138  136 - 145 mmol/L Final  Potassium 03/06/2020 5.0  3.5 - 5.1 mmol/L Final    Chloride 03/06/2020 100  99 - 110 mmol/L Final    CO2 03/06/2020 25  21 - 32 mmol/L Final    Anion Gap 03/06/2020 13  3 - 16 Final    Glucose 03/06/2020 168* 70 - 99 mg/dL Final    BUN 03/06/2020 50* 7 - 20 mg/dL Final    CREATININE 03/06/2020 2.7* 0.9 - 1.3 mg/dL Final    GFR Non- 03/06/2020 25* >60 Final    Comment: >60 mL/min/1.73m2 EGFR, calc. for ages 25 and older using the  MDRD formula (not corrected for weight), is valid for stable  renal function.  GFR  03/06/2020 30* >60 Final    Comment: Chronic Kidney Disease: less than 60 ml/min/1.73 sq.m. Kidney Failure: less than 15 ml/min/1.73 sq.m. Results valid for patients 18 years and older.  Calcium 03/06/2020 8.8  8.3 - 10.6 mg/dL Final    Total Protein 03/06/2020 6.5  6.4 - 8.2 g/dL Final    Alb 03/06/2020 3.5  3.4 - 5.0 g/dL Final    Albumin/Globulin Ratio 03/06/2020 1.2  1.1 - 2.2 Final    Total Bilirubin 03/06/2020 <0.2  0.0 - 1.0 mg/dL Final    Alkaline Phosphatase 03/06/2020 92  40 - 129 U/L Final    ALT 03/06/2020 10  10 - 40 U/L Final    AST 03/06/2020 8* 15 - 37 U/L Final    Globulin 03/06/2020 3.0  g/dL Final    Sodium 03/07/2020 138  136 - 145 mmol/L Final    Potassium reflex Magnesium 03/07/2020 4.9  3.5 - 5.1 mmol/L Final    Chloride 03/07/2020 102  99 - 110 mmol/L Final    CO2 03/07/2020 20* 21 - 32 mmol/L Final    Anion Gap 03/07/2020 16  3 - 16 Final    Glucose 03/07/2020 170* 70 - 99 mg/dL Final    BUN 03/07/2020 45* 7 - 20 mg/dL Final    CREATININE 03/07/2020 2.6* 0.9 - 1.3 mg/dL Final    GFR Non- 03/07/2020 26* >60 Final    Comment: >60 mL/min/1.73m2 EGFR, calc. for ages 25 and older using the  MDRD formula (not corrected for weight), is valid for stable  renal function.  GFR  03/07/2020 31* >60 Final    Comment: Chronic Kidney Disease: less than 60 ml/min/1.73 sq.m. Kidney Failure: less than 15 ml/min/1.73 sq.m. Results valid for patients 18 years and older.  Calcium 03/07/2020 8.4  8.3 - 10.6 mg/dL Final    Sodium 03/08/2020 137  136 - 145 mmol/L Final    Potassium 03/08/2020 4.8  3.5 - 5.1 mmol/L Final    Chloride 03/08/2020 104  99 - 110 mmol/L Final    CO2 03/08/2020 21  21 - 32 mmol/L Final    Anion Gap 03/08/2020 12  3 - 16 Final    Glucose 03/08/2020 146* 70 - 99 mg/dL Final    BUN 03/08/2020 49* 7 - 20 mg/dL Final    CREATININE 03/08/2020 2.7* 0.9 - 1.3 mg/dL Final    GFR Non- 03/08/2020 25* >60 Final    Comment: >60 mL/min/1.73m2 EGFR, calc. for ages 25 and older using the  MDRD formula (not corrected for weight), is valid for stable  renal function.  GFR  03/08/2020 30* >60 Final    Comment: Chronic Kidney Disease: less than 60 ml/min/1.73 sq.m. Kidney Failure: less than 15 ml/min/1.73 sq.m. Results valid for patients 18 years and older.       Calcium 03/08/2020 8.9  8.3 - 10.6 mg/dL Final    Total Protein 03/08/2020 6.5  6.4 - 8.2 g/dL Final    Alb 03/08/2020 3.5  3.4 - 5.0 g/dL Final    Albumin/Globulin Ratio 03/08/2020 1.2  1.1 - 2.2 Final    Total Bilirubin 03/08/2020 <0.2  0.0 - 1.0 mg/dL Final    Alkaline Phosphatase 03/08/2020 83  40 - 129 U/L Final    ALT 03/08/2020 8* 10 - 40 U/L Final    AST 03/08/2020 7* 15 - 37 U/L Final    Globulin 03/08/2020 3.0  g/dL Final    Sodium 03/09/2020 140  136 - 145 mmol/L Final    Potassium 03/09/2020 5.3* 3.5 - 5.1 mmol/L Final    Chloride 03/09/2020 103  99 - 110 mmol/L Final    CO2 03/09/2020 22  21 - 32 mmol/L Final    Anion Gap 03/09/2020 15  3 - 16 Final    Glucose 03/09/2020 113* 70 - 99 mg/dL Final    BUN 03/09/2020 46* 7 - 20 mg/dL Final    CREATININE 03/09/2020 2.4* 0.9 - 1.3 mg/dL Final    GFR Non- 03/09/2020 28* >60 Final    Comment: >60 mL/min/1.73m2 EGFR, calc. for ages 25 and older using the  MDRD formula (not corrected for weight), is valid for stable  renal function.  GFR  03/09/2020 34* >60 Final    Comment: Chronic Kidney Disease: less than 60 ml/min/1.73 sq.m. Kidney Failure: less than 15 ml/min/1.73 sq.m. Results valid for patients 18 years and older.  Calcium 03/09/2020 9.3  8.3 - 10.6 mg/dL Final    Sodium 03/10/2020 136  136 - 145 mmol/L Final    Potassium reflex Magnesium 03/10/2020 5.3* 3.5 - 5.1 mmol/L Final    Chloride 03/10/2020 104  99 - 110 mmol/L Final    CO2 03/10/2020 19* 21 - 32 mmol/L Final    Anion Gap 03/10/2020 13  3 - 16 Final    Glucose 03/10/2020 177* 70 - 99 mg/dL Final    BUN 03/10/2020 43* 7 - 20 mg/dL Final    CREATININE 03/10/2020 2.6* 0.9 - 1.3 mg/dL Final    GFR Non- 03/10/2020 26* >60 Final    Comment: >60 mL/min/1.73m2 EGFR, calc. for ages 25 and older using the  MDRD formula (not corrected for weight), is valid for stable  renal function.  GFR  03/10/2020 31* >60 Final    Comment: Chronic Kidney Disease: less than 60 ml/min/1.73 sq.m. Kidney Failure: less than 15 ml/min/1.73 sq.m. Results valid for patients 18 years and older.       Calcium 03/10/2020 9.2  8.3 - 10.6 mg/dL Final        Mental Status Exam at Discharge:  Level of consciousness:  awake  Appearance:  well-appearing, in chair, good grooming and good hygiene well-developed, well-nourished  Behavior/Motor:  no abnormalities noted normal gait and station AIMS: 0  Attitude toward examiner:  cooperative, attentive and good eye contact  Speech:  spontaneous, normal rate, normal volume and well articulated  Mood:  I want to leave  Affect:  anxious Anxiety: mild  Hallucinations: not rtis  Thought processes:  coherent Attention span, Concentration & Attention:  attention span and concentration were age appropriate  Thought content:  Reality based no evidence of delusions OCD: none    Insight:limited insight and judgment Cognition:  oriented

## 2020-03-11 NOTE — PLAN OF CARE
Pt is irritable and labile when interacting with staff. He refuses to comply with medications at first but then came back later and took them. He stated \" was my Klonopin in there\" when nurse told him already so then he went to room and shut the door.   Andree Mcrae BSN, RN-BC

## 2020-03-13 ENCOUNTER — HOSPITAL ENCOUNTER (EMERGENCY)
Age: 55
Discharge: HOME | End: 2020-03-13
Attending: EMERGENCY MEDICINE
Payer: MEDICARE

## 2020-03-13 VITALS
SYSTOLIC BLOOD PRESSURE: 118 MMHG | RESPIRATION RATE: 15 BRPM | DIASTOLIC BLOOD PRESSURE: 78 MMHG | OXYGEN SATURATION: 98 % | TEMPERATURE: 97 F | HEART RATE: 95 BPM

## 2020-03-13 LAB
ACETAMINOPHEN LEVEL: <5 UG/ML (ref 10–30)
AMPHETAMINE SCREEN, URINE: NORMAL
ANION GAP SERPL CALCULATED.3IONS-SCNC: 15 MMOL/L (ref 3–16)
BARBITURATE SCREEN URINE: NORMAL
BASOPHILS ABSOLUTE: 0.1 K/UL (ref 0–0.2)
BASOPHILS RELATIVE PERCENT: 1.3 %
BENZODIAZEPINE SCREEN, URINE: NORMAL
BUN BLDV-MCNC: 40 MG/DL (ref 7–20)
CALCIUM SERPL-MCNC: 8.7 MG/DL (ref 8.3–10.6)
CANNABINOID SCREEN URINE: NORMAL
CHLORIDE BLD-SCNC: 102 MMOL/L (ref 99–110)
CO2: 21 MMOL/L (ref 21–32)
COCAINE METABOLITE SCREEN URINE: NORMAL
CREAT SERPL-MCNC: 2.9 MG/DL (ref 0.9–1.3)
EOSINOPHILS ABSOLUTE: 0.6 K/UL (ref 0–0.6)
EOSINOPHILS RELATIVE PERCENT: 6.5 %
ETHANOL: NORMAL MG/DL (ref 0–0.08)
GFR AFRICAN AMERICAN: 27
GFR NON-AFRICAN AMERICAN: 23
GLUCOSE BLD-MCNC: 135 MG/DL (ref 70–99)
HCT VFR BLD CALC: 34.6 % (ref 40.5–52.5)
HEMOGLOBIN: 11.5 G/DL (ref 13.5–17.5)
LYMPHOCYTES ABSOLUTE: 2.8 K/UL (ref 1–5.1)
LYMPHOCYTES RELATIVE PERCENT: 30.1 %
Lab: NORMAL
MCH RBC QN AUTO: 32.1 PG (ref 26–34)
MCHC RBC AUTO-ENTMCNC: 33.1 G/DL (ref 31–36)
MCV RBC AUTO: 97 FL (ref 80–100)
METHADONE SCREEN, URINE: NORMAL
MONOCYTES ABSOLUTE: 0.6 K/UL (ref 0–1.3)
MONOCYTES RELATIVE PERCENT: 7 %
NEUTROPHILS ABSOLUTE: 5.1 K/UL (ref 1.7–7.7)
NEUTROPHILS RELATIVE PERCENT: 55.1 %
OPIATE SCREEN URINE: NORMAL
OXYCODONE URINE: NORMAL
PDW BLD-RTO: 13.8 % (ref 12.4–15.4)
PH UA: 5
PHENCYCLIDINE SCREEN URINE: NORMAL
PLATELET # BLD: 387 K/UL (ref 135–450)
PMV BLD AUTO: 8.2 FL (ref 5–10.5)
POTASSIUM REFLEX MAGNESIUM: 4.5 MMOL/L (ref 3.5–5.1)
PROPOXYPHENE SCREEN: NORMAL
RBC # BLD: 3.57 M/UL (ref 4.2–5.9)
SALICYLATE, SERUM: <0.3 MG/DL (ref 15–30)
SODIUM BLD-SCNC: 138 MMOL/L (ref 136–145)
WBC # BLD: 9.2 K/UL (ref 4–11)

## 2020-03-13 PROCEDURE — 99285 EMERGENCY DEPT VISIT HI MDM: CPT

## 2020-03-13 PROCEDURE — G0480 DRUG TEST DEF 1-7 CLASSES: HCPCS

## 2020-03-13 PROCEDURE — 85025 COMPLETE CBC W/AUTO DIFF WBC: CPT

## 2020-03-13 PROCEDURE — 80307 DRUG TEST PRSMV CHEM ANLYZR: CPT

## 2020-03-13 PROCEDURE — 80048 BASIC METABOLIC PNL TOTAL CA: CPT

## 2020-03-13 NOTE — ED PROVIDER NOTES
tablet 0    tamsulosin (FLOMAX) 0.4 MG capsule Take 1 capsule by mouth daily Medi set 30 capsule 0    pantoprazole (PROTONIX) 40 MG tablet Take 1 tablet by mouth every morning (before breakfast) mediset 30 tablet 0    sodium zirconium cyclosilicate (LOKELMA) 10 g PACK oral suspension Take 10 g by mouth 3 times daily 3 packet 0     No Known Allergies    REVIEW OF SYSTEMS  10 systems reviewed, pertinent positives per HPI otherwise noted to be negative. PHYSICAL EXAM  /78   Pulse 95   Temp 97 °F (36.1 °C) (Oral)   Resp 15   SpO2 98%    Physical exam:  General appearance: awake and cooperative. No distress. Non toxic appearing. Skin: Warm and dry. No rashes or lesions. HENT: Normocephalic. Atraumatic. Neck: supple  Eyes: KARUNA. EOM intact. Heart: RRR. No murmurs. Lungs: Respirations unlabored. CTAB. No wheezes, rales, or rhonchi. Good air exchange  Abdomen: No tenderness. Soft. Non distended. No peritoneal signs. Musculoskeletal: No extremity edema. Compartments soft. No deformity. No tenderness in the extremities. All extremities neurovascularly intact. Radial, Dp, and PT pulses +2/4 bilaterally  Neurological: Alert and oriented. No focal deficits. No aphasia or dysarthria. No gait ataxia. Psychiatric: mildly agitated mood and affect but re-directable and responds well to direction. Cooperative here but repeatedly states that he needs to leave hospital. Denies SI or HI. Does not exhibit flight of ideas. Does not appear internally stimulated but has poor focus and insight       LABS  I have reviewed all labs for this visit.    Results for orders placed or performed during the hospital encounter of 03/13/20   Ethanol   Result Value Ref Range    Ethanol Lvl None Detected mg/dL   Drug screen multi urine   Result Value Ref Range    Amphetamine Screen, Urine Neg Negative <1000ng/mL    Barbiturate Screen, Ur Neg Negative <200 ng/mL    Benzodiazepine Screen, Urine Neg Negative <200 ng/mL Cannabinoid Scrn, Ur Neg Negative <50 ng/mL    Cocaine Metabolite Screen, Urine Neg Negative <300 ng/mL    Opiate Scrn, Ur Neg Negative <300 ng/mL    PCP Screen, Urine Neg Negative <25 ng/mL    Methadone Screen, Urine Neg Negative <300 ng/mL    Propoxyphene Scrn, Ur Neg Negative <300 ng/mL    Oxycodone Urine Neg Negative <100 ng/ml    pH, UA 5.0     Drug Screen Comment: see below    Acetaminophen level   Result Value Ref Range    Acetaminophen Level <5 (L) 10 - 30 ug/mL   Salicylate   Result Value Ref Range    Salicylate, Serum <8.9 (L) 15.0 - 30.0 mg/dL   CBC Auto Differential   Result Value Ref Range    WBC 9.2 4.0 - 11.0 K/uL    RBC 3.57 (L) 4.20 - 5.90 M/uL    Hemoglobin 11.5 (L) 13.5 - 17.5 g/dL    Hematocrit 34.6 (L) 40.5 - 52.5 %    MCV 97.0 80.0 - 100.0 fL    MCH 32.1 26.0 - 34.0 pg    MCHC 33.1 31.0 - 36.0 g/dL    RDW 13.8 12.4 - 15.4 %    Platelets 237 211 - 313 K/uL    MPV 8.2 5.0 - 10.5 fL    Neutrophils % 55.1 %    Lymphocytes % 30.1 %    Monocytes % 7.0 %    Eosinophils % 6.5 %    Basophils % 1.3 %    Neutrophils Absolute 5.1 1.7 - 7.7 K/uL    Lymphocytes Absolute 2.8 1.0 - 5.1 K/uL    Monocytes Absolute 0.6 0.0 - 1.3 K/uL    Eosinophils Absolute 0.6 0.0 - 0.6 K/uL    Basophils Absolute 0.1 0.0 - 0.2 K/uL   Basic Metabolic Panel w/ Reflex to MG   Result Value Ref Range    Sodium 138 136 - 145 mmol/L    Potassium reflex Magnesium 4.5 3.5 - 5.1 mmol/L    Chloride 102 99 - 110 mmol/L    CO2 21 21 - 32 mmol/L    Anion Gap 15 3 - 16    Glucose 135 (H) 70 - 99 mg/dL    BUN 40 (H) 7 - 20 mg/dL    CREATININE 2.9 (H) 0.9 - 1.3 mg/dL    GFR Non-African American 23 (A) >60    GFR  27 (A) >60    Calcium 8.7 8.3 - 10.6 mg/dL       ED COURSE/MDM  Patient seen and evaluated. Old records reviewed. Labs and imaging reviewed and results discussed with patient. Patient has CKD and at his most recent admission had nephrology consult.   His creatinine today is stable at 2.9, no other electrolyte derangements. Potassium is normal.  He is denying any complaints. He is told to follow-up regarding his kidney function with his primary care doctor. I have performed a medical clearance examination on this patient. It is my opinion that no medical conditions were discovered that would preclude admission to a behavioral health unit or discharge home. I feel that the patient is medically stable for disposition by the behavioral health team at this time. Patient is deemed appropriate for discharge per BRIAN. Apparently he is enrolled in [facility] and they see him twice daily. The patient has had chronic suicidal ideation since December, and denies any concern for active plan here today and denied suicidal ideation on my evaluation. He is well followed in the community and did not really benefit from outpatient treatment during last admission. He will be discharged and someone from [facility] team is here to pick the patient up. During the patient's ED course, the patient was given:  Medications - No data to display     CLINICAL IMPRESSION  1. Mood disorder ([address]    2. Chronic kidney disease, unspecified CKD stage        Blood pressure 118/78, pulse 95, temperature 97 °F (36.1 °C), temperature source Oral, resp. rate 15, SpO2 98 %. Patient was given scripts for the following medications. I counseled patient how to take these medications. New Prescriptions    No medications on file       Follow-up with:  MOIZ Bowens CNP    Call in 1 day  regarding your kidney function      DISCLAIMER: This chart was created using Dragon dictation software. Efforts were made by me to ensure accuracy, however some errors may be present due to limitations of this technology and occasionally words are not transcribed correctly.        Reid [place]  03/13/20 [id number]

## 2020-03-13 NOTE — DISCHARGE INSTR - COC
Continuity of Care Form    Patient Name: Faustino Riggs   :  1965  MRN:  [id number]    Admit date:  3/13/2020  Discharge date:  ***    Code Status Order: Prior   Advance Directives:     Admitting Physician:  No admitting provider for patient encounter.   PCP: MOIZ Quick CNP    Discharging Nurse: [facility] Unit/Room#: B4/B4  Discharging Unit Phone Number: ***    Emergency Contact:   Extended Emergency Contact Information  Primary Emergency Contact: Babar Dawkins [address] Phone: [phone]  Mobile Phone: [phone]  Relation: Brother/Sister    Past Surgical History:  Past Surgical History:   Procedure Laterality Date    COLONOSCOPY      COLONOSCOPY  2016    colon polyp    TONSILLECTOMY         Immunization History:   Immunization History   Administered Date(s) Administered    Influenza Virus Vaccine 10/31/2012    Tdap (Boostrix, Adacel) 2019, 2020       Active Problems:  Patient Active Problem List   Diagnosis Code    Anxiety F41.9    Psychosis ([address] F29    Malingering Z76.5    Pulmonary emboli ([address] I26.99    Major depression F32.9    Acute kidney injury superimposed on CKD ([address] N17.9, N18.9    MDD (major depressive disorder), single episode F32.9    Mood disorder ([address] F39    Anemia D64.9    CKD (chronic kidney disease) stage 3, GFR 30-59 ml/min ([facility]) N18.3    Hypertension, essential I10    Type 2 diabetes mellitus without complication, without long-term current use of insulin (HCC) E11.9    Hyperlipidemia E78.5    Tobacco abuse Z72.0    Marijuana use F12.90    Protein-calorie malnutrition, moderate (HCC) E44.0    Schizoaffective disorder, bipolar type (HCC) F25.0    Schizo affective schizophrenia ([address] F25.0    Hypokalemia E87.6    Paronychia of left thumb L03.012       Isolation/Infection:   Isolation          No Isolation        Patient Infection Status     None to display          Nurse Assessment:  Last Vital Signs: /78   Pulse 95   Temp 97 °F (36.1 °C) (Oral)   Resp 15   SpO2 98%     Last documented pain score (0-10 scale):    Last Weight:   Wt Readings from Last 1 Encounters:   20 142 lb (64.4 kg)     Mental Status:  {IP PT MENTAL STATUS:}    IV Access:  { LUKE IV ACCESS:713902973}    Nursing Mobility/ADLs:  Walking   {CHP DME SFTS:358064896}  Transfer  {CHP DME OJIP:787504681}  Bathing  {CHP DME UUNT:015469848}  Dressing  {CHP DME ICAZ:459191444}  Toileting  {CHP DME QFB}  Feeding  {CHP DME ECI}  Med Admin  {CHP DME SXAA:538404751}  Med Delivery   { LUKE MED Delivery:151039542}    Wound Care Documentation and Therapy:        Elimination:  Continence:   · Bowel: {YES / FN:46730}  · Bladder: {YES / NA:87380}  Urinary Catheter: {Urinary Catheter:567353991}   Colostomy/Ileostomy/Ileal Conduit: {YES / AT:61956}       Date of Last BM: ***  No intake or output data in the 24 hours ending 20 1729  No intake/output data recorded.     Safety Concerns:     [address] Safety Concerns:597836629}    Impairments/Disabilities:      [address] Impairments/Disabilities:277185394}    Nutrition Therapy:  Current Nutrition Therapy:   [address] Diet List:043746088}    Routes of Feeding: {CHP DME Other Feedings:221608490}  Liquids: {Slp liquid thickness:39789}  Daily Fluid Restriction: {CHP DME Yes amt example:793712017}  Last Modified Barium Swallow with Video (Video Swallowing Test): {Done Not Done NWRK:928361998}    Treatments at the Time of Hospital Discharge:   Respiratory Treatments: ***  Oxygen Therapy:  {Therapy; copd oxygen:05879}  Ventilator:    { CC Vent FNQJ:334621387}    Rehab Therapies: {THERAPEUTIC INTERVENTION:6108014844}  Weight Bearing Status/Restrictions: [address]  Weight Bearin}  Other Medical Equipment (for information only, NOT a DME order):  {EQUIPMENT:755344728}  Other Treatments: ***    Patient's personal belongings (please select all that are sent with patient):  {CHP DME Belongings:296935639}    RN SIGNATURE:  {Esignature:082938311}    CASE MANAGEMENT/SOCIAL WORK SECTION    Inpatient Status Date: ***    Readmission Risk Assessment Score:  Readmission Risk              Risk of Unplanned Readmission:        0           Discharging to Facility/ Agency   · Name:   · Address:  · Phone:  · Fax:    Dialysis Facility (if applicable)   · Name:  · Address:  · Dialysis Schedule:  · Phone:  · Fax:    / signature: {Esignature:112881740}    PHYSICIAN SECTION    Prognosis: {Prognosis:9439066128}    Condition at Discharge: [address] [place] [address] Patient Condition:849490324}    Rehab Potential (if transferring to Rehab): {Prognosis:9910696520}    Recommended Labs or Other Treatments After Discharge: ***    Physician Certification: I certify the above information and transfer of Ritta Agent  is necessary for the continuing treatment of the diagnosis listed and that he requires {Admit to Appropriate Level of Care:83485} for {GREATER/LESS:957080552} 30 days.      Update Admission H&P: {CHP DME Changes in QVGTQ:429010519}    PHYSICIAN SIGNATURE:  {Esignature:164061552}
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
## 2020-03-31 ENCOUNTER — HOSPITAL ENCOUNTER (EMERGENCY)
Age: 55
Discharge: HOME | End: 2020-03-31
Attending: EMERGENCY MEDICINE
Payer: MEDICARE

## 2020-03-31 VITALS
OXYGEN SATURATION: 98 % | TEMPERATURE: 97.8 F | SYSTOLIC BLOOD PRESSURE: 145 MMHG | HEART RATE: 112 BPM | RESPIRATION RATE: 16 BRPM | DIASTOLIC BLOOD PRESSURE: 75 MMHG

## 2020-03-31 LAB
A/G RATIO: 1.3 (ref 1.1–2.2)
ALBUMIN SERPL-MCNC: 4.4 G/DL (ref 3.4–5)
ALP BLD-CCNC: 62 U/L (ref 40–129)
ALT SERPL-CCNC: 11 U/L (ref 10–40)
AMPHETAMINE SCREEN, URINE: ABNORMAL
ANION GAP SERPL CALCULATED.3IONS-SCNC: 15 MMOL/L (ref 3–16)
AST SERPL-CCNC: 15 U/L (ref 15–37)
BARBITURATE SCREEN URINE: ABNORMAL
BASOPHILS ABSOLUTE: 0.1 K/UL (ref 0–0.2)
BASOPHILS RELATIVE PERCENT: 1.2 %
BENZODIAZEPINE SCREEN, URINE: POSITIVE
BILIRUB SERPL-MCNC: 0.4 MG/DL (ref 0–1)
BUN BLDV-MCNC: 27 MG/DL (ref 7–20)
CALCIUM SERPL-MCNC: 9.8 MG/DL (ref 8.3–10.6)
CANNABINOID SCREEN URINE: ABNORMAL
CHLORIDE BLD-SCNC: 105 MMOL/L (ref 99–110)
CO2: 19 MMOL/L (ref 21–32)
COCAINE METABOLITE SCREEN URINE: ABNORMAL
CREAT SERPL-MCNC: 2.2 MG/DL (ref 0.9–1.3)
EOSINOPHILS ABSOLUTE: 0.5 K/UL (ref 0–0.6)
EOSINOPHILS RELATIVE PERCENT: 7.2 %
ETHANOL: NORMAL MG/DL (ref 0–0.08)
GFR AFRICAN AMERICAN: 38
GFR NON-AFRICAN AMERICAN: 31
GLOBULIN: 3.5 G/DL
GLUCOSE BLD-MCNC: 129 MG/DL (ref 70–99)
HCT VFR BLD CALC: 38.5 % (ref 40.5–52.5)
HEMOGLOBIN: 12.6 G/DL (ref 13.5–17.5)
LYMPHOCYTES ABSOLUTE: 2.3 K/UL (ref 1–5.1)
LYMPHOCYTES RELATIVE PERCENT: 31.1 %
Lab: ABNORMAL
MCH RBC QN AUTO: 30.1 PG (ref 26–34)
MCHC RBC AUTO-ENTMCNC: 32.8 G/DL (ref 31–36)
MCV RBC AUTO: 92 FL (ref 80–100)
METHADONE SCREEN, URINE: ABNORMAL
MONOCYTES ABSOLUTE: 0.6 K/UL (ref 0–1.3)
MONOCYTES RELATIVE PERCENT: 8.3 %
NEUTROPHILS ABSOLUTE: 3.9 K/UL (ref 1.7–7.7)
NEUTROPHILS RELATIVE PERCENT: 52.2 %
OPIATE SCREEN URINE: ABNORMAL
OXYCODONE URINE: ABNORMAL
PDW BLD-RTO: 13.6 % (ref 12.4–15.4)
PH UA: 5
PHENCYCLIDINE SCREEN URINE: ABNORMAL
PLATELET # BLD: 359 K/UL (ref 135–450)
PMV BLD AUTO: 7.5 FL (ref 5–10.5)
POTASSIUM SERPL-SCNC: 4 MMOL/L (ref 3.5–5.1)
PROPOXYPHENE SCREEN: ABNORMAL
RBC # BLD: 4.18 M/UL (ref 4.2–5.9)
SODIUM BLD-SCNC: 139 MMOL/L (ref 136–145)
TOTAL PROTEIN: 7.9 G/DL (ref 6.4–8.2)
WBC # BLD: 7.5 K/UL (ref 4–11)

## 2020-03-31 PROCEDURE — G0480 DRUG TEST DEF 1-7 CLASSES: HCPCS

## 2020-03-31 PROCEDURE — 6370000000 HC RX 637 (ALT 250 FOR IP): Performed by: EMERGENCY MEDICINE

## 2020-03-31 PROCEDURE — 99285 EMERGENCY DEPT VISIT HI MDM: CPT

## 2020-03-31 PROCEDURE — 80053 COMPREHEN METABOLIC PANEL: CPT

## 2020-03-31 PROCEDURE — 85025 COMPLETE CBC W/AUTO DIFF WBC: CPT

## 2020-03-31 PROCEDURE — 80307 DRUG TEST PRSMV CHEM ANLYZR: CPT

## 2020-03-31 RX ORDER — ROPINIROLE 0.25 MG/1
0.25 TABLET, FILM COATED ORAL NIGHTLY
Qty: 30 TABLET | Refills: 0 | Status: ON HOLD | OUTPATIENT
Start: 2020-03-31 | End: 2022-01-01

## 2020-03-31 RX ORDER — LORAZEPAM 1 MG/1
2 TABLET ORAL ONCE
Status: COMPLETED | OUTPATIENT
Start: 2020-03-31 | End: 2020-03-31

## 2020-03-31 RX ORDER — LORAZEPAM 1 MG/1
TABLET ORAL
Status: DISCONTINUED
Start: 2020-03-31 | End: 2020-03-31 | Stop reason: HOSPADM

## 2020-03-31 RX ADMIN — LORAZEPAM 2 MG: 1 TABLET ORAL at 09:25
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
## 2020-03-31 NOTE — ED PROVIDER NOTES
Social Needs    Financial resource strain: Not on file    Food insecurity     Worry: Not on file     Inability: Not on file    Transportation needs     Medical: Not on file     Non-medical: Not on file   Tobacco Use    Smoking status: Current Some Day Smoker     Packs/day: 0.50     Years: 40.00     Pack years: 20.00     Types: Cigarettes    Smokeless tobacco: Never Used    Tobacco comment: refused counseling; smokes less than a pack/day   Substance and Sexual Activity    Alcohol use: No     Comment: no alcohol in 2 months    Drug use: Not Currently     Types: Marijuana     Comment: hx of last used OCt 2011    Sexual activity: Not Currently   Lifestyle    Physical activity     Days per week: Not on file     Minutes per session: Not on file    Stress: Not on file   Relationships    Social connections     Talks on phone: Not on file     Gets together: Not on file     Attends [employer]ism service: Not on file     Active member of club or organization: Not on file     Attends meetings of clubs or organizations: Not on file     Relationship status: Not on file    Intimate partner violence     Fear of current or ex partner: Not on file     Emotionally abused: Not on file     Physically abused: Not on file     Forced sexual activity: Not on file   Other Topics Concern    Not on file   Social History Narrative    Not on file     Current Facility-Administered Medications   Medication Dose Route Frequency Provider Last Rate Last Dose    LORazepam (ATIVAN) 1 MG tablet              Current Outpatient Medications   Medication Sig Dispense Refill    rOPINIRole (REQUIP) 0.25 MG tablet Take 1 tablet by mouth nightly 30 tablet 0    clonazePAM (KLONOPIN) 0.5 MG tablet Take 1 tablet by mouth 2 times daily for 15 days.  Put on medi set 30 tablet 0    lactobacillus (CULTURELLE) capsule Take 1 capsule by mouth daily (with breakfast) Medi set 30 capsule 0    fenofibrate (TRIGLIDE) 160 MG tablet Take 1 tablet by mouth daily 30 tablet 0    fluPHENAZine HCl (PROLIXIN) 10 MG tablet Take 1 tablet by mouth daily Please put on medi set 30 tablet 0    OLANZapine (ZYPREXA) 10 MG tablet Take 1 tablet by mouth daily Medi set 30 tablet 0    propranolol (INDERAL) 20 MG tablet Take 1 tablet by mouth 2 times daily mediset 60 tablet 0    tamsulosin (FLOMAX) 0.4 MG capsule Take 1 capsule by mouth daily Medi set 30 capsule 0    pantoprazole (PROTONIX) 40 MG tablet Take 1 tablet by mouth every morning (before breakfast) mediset 30 tablet 0    sodium zirconium cyclosilicate (LOKELMA) 10 g PACK oral suspension Take 10 g by mouth 3 times daily 3 packet 0     No Known Allergies    REVIEW OF SYSTEMS  10 systems reviewed, pertinent positives per HPI otherwise noted to be negative. PHYSICAL EXAM  BP (!) 145/75   Pulse 112   Temp 97.8 °F (36.6 °C) (Oral)   Resp 16   SpO2 98%    GENERAL APPEARANCE: Awake and alert. Somewhat cooperative. No acute distress. HENT: Normocephalic. Atraumatic. Mucous membranes are moist.  No drooling or stridor. NECK: Supple. EYES: PERRL. EOM's grossly intact. HEART/CHEST: RRR. No murmurs. LUNGS: Respirations unlabored. CTAB. Good air exchange. Speaking comfortably in full sentences. ABDOMEN: No tenderness. Soft. Non-distended. No masses. No organomegaly. No guarding or rebound. MUSCULOSKELETAL: No extremity edema. Compartments soft. No deformity. No tenderness in the extremities. All extremities neurovascularly intact. SKIN: Warm and dry. No acute rashes. NEUROLOGICAL: Alert and oriented. CN's 2-12 intact. No gross facial drooping. Strength 5/5, sensation intact. Gait steady. No gross focal deficits. PSYCHIATRIC: Psychomotor agitation. States that he is endorsing suicidal ideation with a plan. LABS  I have reviewed all labs for this visit.    Results for orders placed or performed during the hospital encounter of 03/31/20   CBC auto differential   Result Value Ref Range    WBC 7.5 4.0 - 11.0 K/uL    RBC 4.18 (L) 4.20 - 5.90 M/uL    Hemoglobin 12.6 (L) 13.5 - 17.5 g/dL    Hematocrit 38.5 (L) 40.5 - 52.5 %    MCV 92.0 80.0 - 100.0 fL    MCH 30.1 26.0 - 34.0 pg    MCHC 32.8 31.0 - 36.0 g/dL    RDW 13.6 12.4 - 15.4 %    Platelets 952 316 - 783 K/uL    MPV 7.5 5.0 - 10.5 fL    Neutrophils % 52.2 %    Lymphocytes % 31.1 %    Monocytes % 8.3 %    Eosinophils % 7.2 %    Basophils % 1.2 %    Neutrophils Absolute 3.9 1.7 - 7.7 K/uL    Lymphocytes Absolute 2.3 1.0 - 5.1 K/uL    Monocytes Absolute 0.6 0.0 - 1.3 K/uL    Eosinophils Absolute 0.5 0.0 - 0.6 K/uL    Basophils Absolute 0.1 0.0 - 0.2 K/uL   Comprehensive metabolic panel   Result Value Ref Range    Sodium 139 136 - 145 mmol/L    Potassium 4.0 3.5 - 5.1 mmol/L    Chloride 105 99 - 110 mmol/L    CO2 19 (L) 21 - 32 mmol/L    Anion Gap 15 3 - 16    Glucose 129 (H) 70 - 99 mg/dL    BUN 27 (H) 7 - 20 mg/dL    CREATININE 2.2 (H) 0.9 - 1.3 mg/dL    GFR Non- 31 (A) >60    GFR  38 (A) >60    Calcium 9.8 8.3 - 10.6 mg/dL    Total Protein 7.9 6.4 - 8.2 g/dL    Alb 4.4 3.4 - 5.0 g/dL    Albumin/Globulin Ratio 1.3 1.1 - 2.2    Total Bilirubin 0.4 0.0 - 1.0 mg/dL    Alkaline Phosphatase 62 40 - 129 U/L    ALT 11 10 - 40 U/L    AST 15 15 - 37 U/L    Globulin 3.5 g/dL   Ethanol   Result Value Ref Range    Ethanol Lvl None Detected mg/dL   Drug screen multi urine   Result Value Ref Range    Amphetamine Screen, Urine Neg Negative <1000ng/mL    Barbiturate Screen, Ur Neg Negative <200 ng/mL    Benzodiazepine Screen, Urine POSITIVE (A) Negative <200 ng/mL    Cannabinoid Scrn, Ur Neg Negative <50 ng/mL    Cocaine Metabolite Screen, Urine Neg Negative <300 ng/mL    Opiate Scrn, Ur Neg Negative <300 ng/mL    PCP Screen, Urine Neg Negative <25 ng/mL    Methadone Screen, Urine Neg Negative <300 ng/mL    Propoxyphene Scrn, Ur Neg Negative <300 ng/mL    Oxycodone Urine Neg Negative <100 ng/ml    pH, UA 5.0     Drug Screen Comment: see below

## 2020-04-23 ENCOUNTER — HOSPITAL ENCOUNTER (EMERGENCY)
Age: 55
Discharge: HOME | End: 2020-04-23
Attending: EMERGENCY MEDICINE
Payer: COMMERCIAL

## 2020-04-23 VITALS
OXYGEN SATURATION: 100 % | HEART RATE: 101 BPM | BODY MASS INDEX: 25.61 KG/M2 | TEMPERATURE: 98.2 F | DIASTOLIC BLOOD PRESSURE: 89 MMHG | RESPIRATION RATE: 16 BRPM | WEIGHT: 150 LBS | SYSTOLIC BLOOD PRESSURE: 136 MMHG | HEIGHT: 64 IN

## 2020-04-23 LAB
A/G RATIO: 1.3 (ref 1.1–2.2)
ACETAMINOPHEN LEVEL: <5 UG/ML (ref 10–30)
ALBUMIN SERPL-MCNC: 4.5 G/DL (ref 3.4–5)
ALP BLD-CCNC: 73 U/L (ref 40–129)
ALT SERPL-CCNC: 14 U/L (ref 10–40)
AMPHETAMINE SCREEN, URINE: NORMAL
ANION GAP SERPL CALCULATED.3IONS-SCNC: 13 MMOL/L (ref 3–16)
ANION GAP SERPL CALCULATED.3IONS-SCNC: 18 MMOL/L (ref 3–16)
AST SERPL-CCNC: 20 U/L (ref 15–37)
BARBITURATE SCREEN URINE: NORMAL
BASOPHILS ABSOLUTE: 0 K/UL (ref 0–0.2)
BASOPHILS RELATIVE PERCENT: 0.7 %
BENZODIAZEPINE SCREEN, URINE: NORMAL
BILIRUB SERPL-MCNC: 0.4 MG/DL (ref 0–1)
BUN BLDV-MCNC: 54 MG/DL (ref 7–20)
BUN BLDV-MCNC: 60 MG/DL (ref 7–20)
CALCIUM SERPL-MCNC: 8.6 MG/DL (ref 8.3–10.6)
CALCIUM SERPL-MCNC: 9.4 MG/DL (ref 8.3–10.6)
CANNABINOID SCREEN URINE: NORMAL
CHLORIDE BLD-SCNC: 104 MMOL/L (ref 99–110)
CHLORIDE BLD-SCNC: 109 MMOL/L (ref 99–110)
CO2: 16 MMOL/L (ref 21–32)
CO2: 17 MMOL/L (ref 21–32)
COCAINE METABOLITE SCREEN URINE: NORMAL
CREAT SERPL-MCNC: 1.6 MG/DL (ref 0.9–1.3)
CREAT SERPL-MCNC: 1.8 MG/DL (ref 0.9–1.3)
EKG ATRIAL RATE: 124 BPM
EKG DIAGNOSIS: NORMAL
EKG P AXIS: 52 DEGREES
EKG P-R INTERVAL: 122 MS
EKG Q-T INTERVAL: 312 MS
EKG QRS DURATION: 78 MS
EKG QTC CALCULATION (BAZETT): 448 MS
EKG R AXIS: 33 DEGREES
EKG T AXIS: 58 DEGREES
EKG VENTRICULAR RATE: 124 BPM
EOSINOPHILS ABSOLUTE: 0.1 K/UL (ref 0–0.6)
EOSINOPHILS RELATIVE PERCENT: 1.1 %
ETHANOL: NORMAL MG/DL (ref 0–0.08)
GFR AFRICAN AMERICAN: 47
GFR AFRICAN AMERICAN: 54
GFR NON-AFRICAN AMERICAN: 39
GFR NON-AFRICAN AMERICAN: 45
GLOBULIN: 3.6 G/DL
GLUCOSE BLD-MCNC: 134 MG/DL (ref 70–99)
GLUCOSE BLD-MCNC: 86 MG/DL (ref 70–99)
HCT VFR BLD CALC: 45.5 % (ref 40.5–52.5)
HEMOGLOBIN: 15.2 G/DL (ref 13.5–17.5)
LYMPHOCYTES ABSOLUTE: 2 K/UL (ref 1–5.1)
LYMPHOCYTES RELATIVE PERCENT: 28.3 %
Lab: NORMAL
MCH RBC QN AUTO: 30.4 PG (ref 26–34)
MCHC RBC AUTO-ENTMCNC: 33.3 G/DL (ref 31–36)
MCV RBC AUTO: 91.1 FL (ref 80–100)
METHADONE SCREEN, URINE: NORMAL
MONOCYTES ABSOLUTE: 0.5 K/UL (ref 0–1.3)
MONOCYTES RELATIVE PERCENT: 7 %
NEUTROPHILS ABSOLUTE: 4.5 K/UL (ref 1.7–7.7)
NEUTROPHILS RELATIVE PERCENT: 62.9 %
OPIATE SCREEN URINE: NORMAL
OXYCODONE URINE: NORMAL
PDW BLD-RTO: 13.1 % (ref 12.4–15.4)
PH UA: 5
PHENCYCLIDINE SCREEN URINE: NORMAL
PLATELET # BLD: 298 K/UL (ref 135–450)
PMV BLD AUTO: 9.4 FL (ref 5–10.5)
POTASSIUM REFLEX MAGNESIUM: 4.6 MMOL/L (ref 3.5–5.1)
POTASSIUM SERPL-SCNC: 4.2 MMOL/L (ref 3.5–5.1)
PROPOXYPHENE SCREEN: NORMAL
RBC # BLD: 4.99 M/UL (ref 4.2–5.9)
SALICYLATE, SERUM: <0.3 MG/DL (ref 15–30)
SODIUM BLD-SCNC: 138 MMOL/L (ref 136–145)
SODIUM BLD-SCNC: 139 MMOL/L (ref 136–145)
TOTAL PROTEIN: 8.1 G/DL (ref 6.4–8.2)
TROPONIN: <0.01 NG/ML
WBC # BLD: 7.1 K/UL (ref 4–11)

## 2020-04-23 PROCEDURE — 2580000003 HC RX 258: Performed by: PHYSICIAN ASSISTANT

## 2020-04-23 PROCEDURE — G0480 DRUG TEST DEF 1-7 CLASSES: HCPCS

## 2020-04-23 PROCEDURE — 93005 ELECTROCARDIOGRAM TRACING: CPT | Performed by: EMERGENCY MEDICINE

## 2020-04-23 PROCEDURE — 80307 DRUG TEST PRSMV CHEM ANLYZR: CPT

## 2020-04-23 PROCEDURE — 99285 EMERGENCY DEPT VISIT HI MDM: CPT

## 2020-04-23 PROCEDURE — 93010 ELECTROCARDIOGRAM REPORT: CPT | Performed by: INTERNAL MEDICINE

## 2020-04-23 PROCEDURE — 84484 ASSAY OF TROPONIN QUANT: CPT

## 2020-04-23 PROCEDURE — 80053 COMPREHEN METABOLIC PANEL: CPT

## 2020-04-23 PROCEDURE — 85025 COMPLETE CBC W/AUTO DIFF WBC: CPT

## 2020-04-23 RX ORDER — 0.9 % SODIUM CHLORIDE 0.9 %
1000 INTRAVENOUS SOLUTION INTRAVENOUS ONCE
Status: COMPLETED | OUTPATIENT
Start: 2020-04-23 | End: 2020-04-23

## 2020-04-23 RX ADMIN — SODIUM CHLORIDE 1000 ML: 9 INJECTION, SOLUTION INTRAVENOUS at 12:37

## 2020-04-23 RX ADMIN — SODIUM CHLORIDE 1000 ML: 9 INJECTION, SOLUTION INTRAVENOUS at 14:36

## 2020-04-23 ASSESSMENT — PAIN DESCRIPTION - ORIENTATION: ORIENTATION: RIGHT;LEFT

## 2020-04-23 ASSESSMENT — PAIN DESCRIPTION - LOCATION: LOCATION: ARM

## 2020-04-23 NOTE — ED PROVIDER NOTES
I independently evaluated and obtained a history and physical on [facility]. All diagnostic, treatment, and disposition assistants were made to myself in conjunction the advanced practice provider. For further details of this patient's emergency department encounter, please see the advanced practice provider's documentation. History: 47-year-old male who presents in police custody. The patient has been in custodial and is prescribed multiple psychiatric medicines however he has not been taking them. Unfortunate officer reports that the patient has refused to eat or drink for 6 days and refused his medications. They report they attempted to get him sent to a psychiatric unit however outpatient test showed that his kidney function was poor and therefore the psychiatric unit refused him and wants medical clearance. The patient is minimally verbal, occasionally answers yes or no, but refuses to answer many questions. Physician Exam:  male in no acute distress. Appears dehydrated. Regular rhythm with tachycardic rate. Dry mucous membranes. .  Intact distal pulses. Moves all 4 extremity spontaneously. MDM: Creatinine of 1.8. Suspect likely acute kidney injury from prerenal dehydration. I feel the patient is appropriate for admission, IV fluids, and psychiatric placement once he is medically clear. I have expressed this to the patient he states he understands and agrees with this. Medicine team reports there has not admit the patient as his creatinine is not any worse than his chronic baseline is. BUN however is elevated. Patient has received 2 L of IV fluids in the emergency department. Plan to repeat BMP. If the patient's BUN significantly improves he will be discharged back into custodial custody as his labs will then be at his baseline. If his BUN does not improve he will be admitted to the medicine service for further care.     After IV fluids given the patient's creatinine and BUN does mildly improved. His creatinine is now better than what his chronic baseline has been. I have spoken to the hospitalist inpatient team about admitting the patient for IV hydration and psychiatric placement however based on his labs today compared to his chronic labs they do not think the patient meets criteria for admission from a medical perspective. I have also spoken to our [facility] psychiatric nursing team who has spoken to long for [employer]. The psychiatric team reports they can only admit the patient here if he is furloughed however after discussion with law enforcement this is not possible given his current legal troubles. I have discussed this all with the  which is with the patient. He reports he feels comfortable taking the patient back to [facility] where he will have access to healthcare and they will continue to place him in a psychiatric facility. Standard ER return precautions given. FINAL IMPRESSION      1.  Chronic kidney disease, unspecified CKD stage               Dania Cruz MD  04/25/20 6674

## 2020-04-23 NOTE — ED PROVIDER NOTES
[address] ED  EMERGENCY DEPARTMENT ENCOUNTER        Pt Name: Juwan Blandon  MRN: [id number]  Danielgfrose 1965  Date of evaluation: 4/23/2020  Provider: Francy Vail PA-C  PCP: MOIZ Boucher CNP     I have seen and evaluated this patient with my supervising physician Dr. Mary Agarwal. CHIEF COMPLAINT       Chief Complaint   Patient presents with    Tachycardia     pt sent from senior living for heart rate around 130, senior living staff stated pt has not been eating x 6 days and drinking very little, pt refusing to talk and uncooperative, has refused psych meds x 1 week       HISTORY OF PRESENT ILLNESS   (Location, Timing/Onset, Context/Setting, Quality, Duration, Modifying Factors, Severity, Associated Signs and Symptoms)  Note limiting factors. Juwan Blandon is a 62 y.o. male with a past medical history of anxiety, malingering, major depressive disorder, mood disorder, chronic kidney disease stage III, hypertension, type 2 diabetes, hyperlipidemia, tobacco use, marijuana use, schizoaffective disorder, bipolar type brought in from senior living for evaluation of tachycardia. Per senior living staff patient has not been eating for the past several days and has been drinking very little. Patient is refusing to talk and is uncooperative and therefore is a poor historian. Patient does take Klonopin, Zyprexa at senior living but has not taken them in several days. I am unable to obtain a history as patient is uncooperative for me. Nursing Notes were all reviewed and agreed with or any disagreements were addressed in the HPI. REVIEW OF SYSTEMS    (2-9 systems for level 4, 10 or more for level 5)     Review of Systems   Unable to perform ROS: Other       Positives and Pertinent negatives as per HPI. Except as noted above in the ROS, all other systems were reviewed and negative.        PAST MEDICAL HISTORY     Past Medical History:   Diagnosis Date    Anxiety     Arthritis     Clostridium difficile diarrhea Mental Illness Brother           SOCIAL HISTORY       Social History     Tobacco Use    Smoking status: Current Some Day Smoker     Packs/day: 0.50     Years: 40.00     Pack years: 20.00     Types: Cigarettes    Smokeless tobacco: Never Used    Tobacco comment: refused counseling; smokes less than a pack/day   Substance Use Topics    Alcohol use: No     Comment: no alcohol in 2 months    Drug use: Not Currently     Types: Marijuana     Comment: hx of last used OCt 2011       SCREENINGS    [place] Coma Scale  Eye Opening: Spontaneous  Best Verbal Response: Oriented  Best Motor Response: Obeys commands  Rui Coma Scale Score: 15        PHYSICAL EXAM    (up to 7 for level 4, 8 or more for level 5)     ED Triage Vitals   BP Temp Temp Source Pulse Resp SpO2 Height Weight   04/23/20 1136 04/23/20 1138 04/23/20 1138 04/23/20 1136 04/23/20 1136 04/23/20 1136 04/23/20 1136 04/23/20 1136   104/73 99.1 °F (37.3 °C) Oral 113 16 100 % 5' 4\" (1.626 m) 150 lb (68 kg)       Physical Exam  Vitals signs and nursing note reviewed. Constitutional:       General: He is awake. He is not in acute distress. Appearance: Normal appearance. He is well-developed and normal weight. He is not ill-appearing, toxic-appearing or diaphoretic. HENT:      Head: Normocephalic and atraumatic. Nose: Nose normal.   Eyes:      General:         Right eye: No discharge. Left eye: No discharge. Neck:      Musculoskeletal: Normal range of motion and neck supple. Cardiovascular:      Rate and Rhythm: Regular rhythm. Tachycardia present. Pulses:           Radial pulses are 2+ on the right side and 2+ on the left side. Heart sounds: Normal heart sounds. No murmur. No gallop. Pulmonary:      Effort: Pulmonary effort is normal. No respiratory distress. Breath sounds: Normal breath sounds. No decreased breath sounds, wheezing, rhonchi or rales. Chest:      Chest wall: No tenderness.    Musculoskeletal: Normal range of motion. General: No deformity. Skin:     General: Skin is warm and dry. Neurological:      General: No focal deficit present. Mental Status: He is alert and oriented to person, place, and time. GCS: GCS eye subscore is 4. GCS verbal subscore is 5. GCS motor subscore is 6. Cranial Nerves: Cranial nerves are intact. Psychiatric:         Behavior: Behavior normal. Behavior is cooperative.          DIAGNOSTIC RESULTS   LABS:    Labs Reviewed   COMPREHENSIVE METABOLIC PANEL W/ REFLEX TO MG FOR LOW K - Abnormal; Notable for the following components:       Result Value    CO2 16 (*)     Anion Gap 18 (*)     Glucose 134 (*)     BUN 60 (*)     CREATININE 1.8 (*)     GFR Non- 39 (*)     GFR  52 (*)     All other components within normal limits    Narrative:     Performed at:  [facility] [employer]  [place] [facility]   Phone [phone]   ACETAMINOPHEN LEVEL - Abnormal; Notable for the following components:    Acetaminophen Level <5 (*)     All other components within normal limits    Narrative:     Performed at:  [place] [id number],  [employer] [facility]   Phone [phone]   SALICYLATE LEVEL - Abnormal; Notable for the following components:    Salicylate, Serum <0.4 (*)     All other components within normal limits    Narrative:     Performed at:  [facility] [employer]  [place] [facility]   Phone [phone]   BASIC METABOLIC PANEL - Abnormal; Notable for the following components:    CO2 17 (*)     BUN 54 (*)     CREATININE 1.6 (*)     GFR Non- 45 (*)     GFR  54 (*)     All other components within normal limits    Narrative:     Performed at:  [facility] [employer]  [facility]   Phone [phone]   CBC WITH AUTO DIFFERENTIAL    Narrative:

## 2020-04-23 NOTE — ED NOTES
Pt refusing to answer questions. Unable to obtain some history and and screening questions.      Nereida Borden RN  04/23/20 3385

## 2020-04-23 NOTE — ED TRIAGE NOTES
Chief Complaint   Patient presents with    Tachycardia     pt sent from longterm for heart rate around 130, longterm staff stated pt has not been eating x 6 days and drinking very little, pt refusing to talk and uncooperative, has refused psych meds x 1 week

## 2020-04-23 NOTE — ED NOTES
Perfect serve message to Dr. Michelle Newman @ [phone]   04/23/20 [address] returned the call to Amber Patiño @ [phone]   04/23/20 9111

## 2021-01-01 ENCOUNTER — CARE COORDINATION (OUTPATIENT)
Dept: CARE COORDINATION | Age: 56
End: 2021-01-01

## 2021-01-01 ENCOUNTER — HOSPITAL ENCOUNTER (EMERGENCY)
Age: 56
Discharge: HOME | End: 2021-10-19
Attending: STUDENT IN AN ORGANIZED HEALTH CARE EDUCATION/TRAINING PROGRAM
Payer: COMMERCIAL

## 2021-01-01 VITALS
DIASTOLIC BLOOD PRESSURE: 92 MMHG | TEMPERATURE: 98.8 F | HEIGHT: 66 IN | WEIGHT: 120 LBS | BODY MASS INDEX: 19.29 KG/M2 | RESPIRATION RATE: 16 BRPM | OXYGEN SATURATION: 100 % | SYSTOLIC BLOOD PRESSURE: 135 MMHG | HEART RATE: 83 BPM

## 2021-01-01 DIAGNOSIS — G25.81 RESTLESS LEGS: ICD-10-CM

## 2021-01-01 DIAGNOSIS — F41.1 ANXIETY STATE: Primary | ICD-10-CM

## 2021-01-01 LAB
A/G RATIO: 1.4 (ref 1.1–2.2)
ACETAMINOPHEN LEVEL: <5 UG/ML (ref 10–30)
ALBUMIN SERPL-MCNC: 4.1 G/DL (ref 3.4–5)
ALP BLD-CCNC: 93 U/L (ref 40–129)
ALT SERPL-CCNC: 8 U/L (ref 10–40)
AMPHETAMINE SCREEN, URINE: ABNORMAL
ANION GAP SERPL CALCULATED.3IONS-SCNC: 17 MMOL/L (ref 3–16)
AST SERPL-CCNC: 11 U/L (ref 15–37)
BARBITURATE SCREEN URINE: ABNORMAL
BASOPHILS ABSOLUTE: 0.1 K/UL (ref 0–0.2)
BASOPHILS RELATIVE PERCENT: 0.9 %
BENZODIAZEPINE SCREEN, URINE: ABNORMAL
BILIRUB SERPL-MCNC: 0.5 MG/DL (ref 0–1)
BUN BLDV-MCNC: 18 MG/DL (ref 7–20)
CALCIUM SERPL-MCNC: 9 MG/DL (ref 8.3–10.6)
CANNABINOID SCREEN URINE: POSITIVE
CHLORIDE BLD-SCNC: 107 MMOL/L (ref 99–110)
CO2: 16 MMOL/L (ref 21–32)
COCAINE METABOLITE SCREEN URINE: ABNORMAL
CREAT SERPL-MCNC: 1.6 MG/DL (ref 0.9–1.3)
EKG ATRIAL RATE: 92 BPM
EKG DIAGNOSIS: NORMAL
EKG P AXIS: 52 DEGREES
EKG P-R INTERVAL: 132 MS
EKG Q-T INTERVAL: 360 MS
EKG QRS DURATION: 94 MS
EKG QTC CALCULATION (BAZETT): 445 MS
EKG R AXIS: 48 DEGREES
EKG T AXIS: 67 DEGREES
EKG VENTRICULAR RATE: 92 BPM
EOSINOPHILS ABSOLUTE: 0.2 K/UL (ref 0–0.6)
EOSINOPHILS RELATIVE PERCENT: 2 %
ETHANOL: NORMAL MG/DL (ref 0–0.08)
GFR AFRICAN AMERICAN: 54
GFR NON-AFRICAN AMERICAN: 45
GLOBULIN: 3 G/DL
GLUCOSE BLD-MCNC: 113 MG/DL (ref 70–99)
HCT VFR BLD CALC: 39.5 % (ref 40.5–52.5)
HEMOGLOBIN: 13.2 G/DL (ref 13.5–17.5)
LYMPHOCYTES ABSOLUTE: 2 K/UL (ref 1–5.1)
LYMPHOCYTES RELATIVE PERCENT: 25.5 %
Lab: ABNORMAL
MCH RBC QN AUTO: 31.8 PG (ref 26–34)
MCHC RBC AUTO-ENTMCNC: 33.3 G/DL (ref 31–36)
MCV RBC AUTO: 95.5 FL (ref 80–100)
METHADONE SCREEN, URINE: ABNORMAL
MONOCYTES ABSOLUTE: 0.5 K/UL (ref 0–1.3)
MONOCYTES RELATIVE PERCENT: 6.1 %
NEUTROPHILS ABSOLUTE: 5.1 K/UL (ref 1.7–7.7)
NEUTROPHILS RELATIVE PERCENT: 65.5 %
OPIATE SCREEN URINE: ABNORMAL
OXYCODONE URINE: ABNORMAL
PDW BLD-RTO: 14.4 % (ref 12.4–15.4)
PH UA: 5
PHENCYCLIDINE SCREEN URINE: ABNORMAL
PLATELET # BLD: 276 K/UL (ref 135–450)
PMV BLD AUTO: 7.8 FL (ref 5–10.5)
POTASSIUM REFLEX MAGNESIUM: 4.1 MMOL/L (ref 3.5–5.1)
PROPOXYPHENE SCREEN: ABNORMAL
RBC # BLD: 4.14 M/UL (ref 4.2–5.9)
SALICYLATE, SERUM: <0.3 MG/DL (ref 15–30)
SODIUM BLD-SCNC: 140 MMOL/L (ref 136–145)
TOTAL CK: 93 U/L (ref 39–308)
TOTAL PROTEIN: 7.1 G/DL (ref 6.4–8.2)
WBC # BLD: 7.7 K/UL (ref 4–11)

## 2021-01-01 PROCEDURE — 93010 ELECTROCARDIOGRAM REPORT: CPT | Performed by: INTERNAL MEDICINE

## 2021-01-01 PROCEDURE — 96372 THER/PROPH/DIAG INJ SC/IM: CPT

## 2021-01-01 PROCEDURE — 80053 COMPREHEN METABOLIC PANEL: CPT

## 2021-01-01 PROCEDURE — 82077 ASSAY SPEC XCP UR&BREATH IA: CPT

## 2021-01-01 PROCEDURE — 80143 DRUG ASSAY ACETAMINOPHEN: CPT

## 2021-01-01 PROCEDURE — 6370000000 HC RX 637 (ALT 250 FOR IP): Performed by: STUDENT IN AN ORGANIZED HEALTH CARE EDUCATION/TRAINING PROGRAM

## 2021-01-01 PROCEDURE — 99284 EMERGENCY DEPT VISIT MOD MDM: CPT

## 2021-01-01 PROCEDURE — 2580000003 HC RX 258: Performed by: STUDENT IN AN ORGANIZED HEALTH CARE EDUCATION/TRAINING PROGRAM

## 2021-01-01 PROCEDURE — 93005 ELECTROCARDIOGRAM TRACING: CPT | Performed by: STUDENT IN AN ORGANIZED HEALTH CARE EDUCATION/TRAINING PROGRAM

## 2021-01-01 PROCEDURE — 85025 COMPLETE CBC W/AUTO DIFF WBC: CPT

## 2021-01-01 PROCEDURE — 2580000003 HC RX 258

## 2021-01-01 PROCEDURE — 80307 DRUG TEST PRSMV CHEM ANLYZR: CPT

## 2021-01-01 PROCEDURE — 80179 DRUG ASSAY SALICYLATE: CPT

## 2021-01-01 PROCEDURE — 82550 ASSAY OF CK (CPK): CPT

## 2021-01-01 PROCEDURE — 2500000003 HC RX 250 WO HCPCS: Performed by: STUDENT IN AN ORGANIZED HEALTH CARE EDUCATION/TRAINING PROGRAM

## 2021-01-01 RX ORDER — 0.9 % SODIUM CHLORIDE 0.9 %
1000 INTRAVENOUS SOLUTION INTRAVENOUS ONCE
Status: COMPLETED | OUTPATIENT
Start: 2021-01-01 | End: 2021-01-01

## 2021-01-01 RX ORDER — NICOTINE 21 MG/24HR
1 PATCH, TRANSDERMAL 24 HOURS TRANSDERMAL DAILY
Status: DISCONTINUED | OUTPATIENT
Start: 2021-01-01 | End: 2021-01-01 | Stop reason: HOSPADM

## 2021-01-01 RX ORDER — OLANZAPINE 10 MG/1
10 INJECTION, POWDER, LYOPHILIZED, FOR SOLUTION INTRAMUSCULAR
Status: COMPLETED | OUTPATIENT
Start: 2021-01-01 | End: 2021-01-01

## 2021-01-01 RX ORDER — ROPINIROLE 0.25 MG/1
0.25 TABLET, FILM COATED ORAL ONCE
Status: COMPLETED | OUTPATIENT
Start: 2021-01-01 | End: 2021-01-01

## 2021-01-01 RX ADMIN — WATER 2.1 ML: 1 INJECTION INTRAMUSCULAR; INTRAVENOUS; SUBCUTANEOUS at 09:33

## 2021-01-01 RX ADMIN — ROPINIROLE HYDROCHLORIDE 0.25 MG: 0.25 TABLET, FILM COATED ORAL at 09:38

## 2021-01-01 RX ADMIN — OLANZAPINE 10 MG: 10 INJECTION, POWDER, FOR SOLUTION INTRAMUSCULAR at 09:33

## 2021-01-01 RX ADMIN — SODIUM CHLORIDE 1000 ML: 9 INJECTION, SOLUTION INTRAVENOUS at 09:25

## 2021-10-19 NOTE — ED NOTES
Revitalized pt. Pt will not leave blood pressure cuff and pulse ox on for any amount of time past when the blood pressure cuff deflates.      Lee Lam RN  10/19/21 1128

## 2021-10-19 NOTE — ED TRIAGE NOTES
Chief Complaint   Patient presents with    Psychiatric Evaluation     pt is paranoid. denies drug use.  has been here and admitted to psych in the past.  pt states that he has been paranoid for days but tosay is the worse. pt can not sit still.

## 2021-10-19 NOTE — ED TRIAGE NOTES
Patient needs transportation home. Patient given cab voucher. Placed a call to [employer]'s cab. P/U time is 1300.

## 2021-10-19 NOTE — ED NOTES
Presenting Problem: Patient presents to [facility] voluntarily after he was brought in by squad. Pt states he was at a neighbor's apartment and the neighbor called squad due to pt complaining of increased paranoia. Upon arrival to ED, pt reported he was feeling paranoid over the last few days. He denied recent drug use and states he has been compliant with his psychiatric medications. Upon arriving to [facility], he refused to change into safety gown and initially refused to participate in lethality assessment. Pt stated that he was fine and that he was, \"just having a panic attack earlier. \" Pt eventually agreed to participate in assessment with writer. At present time, pt is alert and oriented x4. His thoughts are organized and he has answered all questions appropriately. He admits to smoking numerous cigarettes in a row prior to the squad being called and he acknowledges that this played a role in the increase in anxiety. He denies current paranoia and states earlier, he was having thoughts that his family did not like him. He appears somewhat anxious and irritable. He does not appear to be RTIS and he denies all recent A/V/H. He denies all HI, SI, plan, and intent. He is a part of the ACT team at [facility] and states he has a meeting today at his home with his CM. He is requesting to leave at this time so he can be present for this appointment. Patient was clinically sober at the time of the evaluation. Appearance/Hygiene:  street clothes, in chair, good grooming and good hygiene   Motor Behavior: WNL   Attitude: currently cooperative  Affect: anxiety   Speech: normal pitch and normal volume  Mood: anxious and irritable   Thought Processes: Goal directed  Perceptions: Absent   Thought content: future oriented    Orientation: A&Ox4   Memory: intact  Concentration: Fair    Insight/ judgement: impaired insight      Psychosocial and contextual factors: Pt lives in a motel in [place] that was turned into an apartment complex.  He acquired this housing through [employer]. He is currently on the ACT team and has an appointment with his CM today in his home. He is currently unemployed and has SSDI. He states he has adequate support from his neighbor and that he gets to see his family every once in a while. C-SSRS flowsheet is complete. Psychiatric History (including current outpatient provider and past inpatient admissions): Previous dx of SAD, Bipolar type. Previous admissions here on [facility], at [facility], and [address] [place] [address] states he does not remember dates of admissions. Access to Firearms: Denies    ASSESSMENT FOR IMMINENT FUTURE DANGER:    RISK FACTORS:    [x]  Age <25 or >49   [x]  Male gender   []  Depressed mood   []  Active suicidal ideation   []  Suicide plan   []  Suicide attempt   []  Access to lethal means   [x]  Prior suicide attempt   []  Active substance abuse    []  Highly impulsive behaviors   []  Not attending to self-care/ADLs    []  Recent significant loss   []  Chronic pain or medical illness   []  Social isolation   []  History of violence    []  Active psychosis   []  Cognitive impairment    []  No outpatient services in place   []  Medication noncompliance   []  No collateral information to support safety  [] Self- injurious/ Self-harm behavior    PROTECTIVE FACTORS:  [] Age >25 and <55  [] Female gender   [x] Denies depression  [x] Denies suicidal ideation  [x] Does not have lethal plan   [x] Does not have access to guns or weapons  [x] Patient is verbally miguel angel for safety  [] No prior suicide attempts  [x] No active substance abuse  [x] Patient has social or family support  [x] No active psychosis or cognitive dysfunction  [x] Physically healthy  [x] Has outpatient services in place  [x] Compliant with recommended medications  [] Collateral information from. ..supports patient safety   [x] Patient is future oriented with plans to attend his case management appointment today           Whitney Chavez, [address]  10/19/21 5822

## 2021-10-19 NOTE — ED NOTES
Patient has order to discharge home. Patient given discharge instructions. Patient states he understands. Patient left to home via cab.       Charissa Cooks, RN  10/19/21 9934

## 2021-10-19 NOTE — ED NOTES
Level of Care Disposition: Discharge      Patient was seen by ED provider and [facility] staff. The case was presented to psychiatric provider on-call Dr. Muna Marino.   Based on the ED evaluation and information presented to the provider by this writer, it is the recommendation of the on call psychiatric provider that the patient be discharged from a psychiatric standpoint with the following referrals: [address], [address] [facility]  10/19/21 6074

## 2021-10-19 NOTE — ED PROVIDER NOTES
[address] ED      CHIEF COMPLAINT  Psychiatric evaluation      HISTORY OF PRESENT ILLNESS  Ana Paula Jin is a 60 y.o. male with a past medical history of schizoaffective disorder, bipolar type, psychosis, chronic kidney disease, hypertension diabetes, hyperlipidemia, and pulmonary embolism who presents to the ED complaining of anxiety. Patient was brought in by EMS. Patient reports feeling paranoia, states today it is worse and he is very anxious. Suicidal ideation: Denies  Plan: Denies  Homicidal ideation: Denies  Access to firearms: Denies  Audiovisual hallucinations: Denies  Psychiatric medications: Patient is supposed to be on Zyprexa and Klonopin. He indicates that he may be has not been taking this. Tobacco use: Yes  Alcohol use: Denies  Illicit drug use: Marijuana    Somatic complaints: Patient states that he cannot sit still due to anxiety. He denies chest pain, shortness of breath, abdominal pain, extremity pain, or any other new acute somatic complaints. Old records reviewed: patient has been seen multiple times last year for mood disorders. Also been noted to have chronic kidney disease in the past.    No other complaints, modifying factors or associated symptoms. I have reviewed the following from the nursing documentation.     Past Medical History:   Diagnosis Date    Anxiety     Arthritis     Clostridium difficile diarrhea 1/10/15    Depression     Difficulty urinating     DM (diabetes mellitus) ([address]     Hematoma     pt unsure where it was    Hx of blood clots     pt unsure where    Hyperlipidemia     Hypertension     Pneumonia     Psychosis ([address]      Past Surgical History:   Procedure Laterality Date    COLONOSCOPY      COLONOSCOPY  11/30/2016    colon polyp    TONSILLECTOMY       Family History   Problem Relation Age of Onset    Heart Disease Mother     Mental Illness Mother     Cancer Father     Heart Disease Brother     Mental Illness Brother Social History     Socioeconomic History    Marital status:      Spouse name: Not on file    Number of children: 1    Years of education: 6    Highest education level: Not on file   Occupational History    Not on file   Tobacco Use    Smoking status: Current Some Day Smoker     Packs/day: 0.50     Years: 40.00     Pack years: 20.00     Types: Cigarettes    Smokeless tobacco: Never Used    Tobacco comment: refused counseling; smokes less than a pack/day   Vaping Use    Vaping Use: Never assessed   Substance and Sexual Activity    Alcohol use: No     Comment: no alcohol in 2 months    Drug use: Not Currently     Types: Marijuana     Comment: hx of last used OCt 2011    Sexual activity: Not Currently   Other Topics Concern    Not on file   Social History Narrative    Not on file     Social Determinants of Health     Financial Resource Strain:     Difficulty of Paying Living Expenses:    Food Insecurity:     Worried About Running Out of Food in the Last Year:     Ran Out of Food in the Last Year:    Transportation Needs:     Lack of Transportation (Medical):  Lack of Transportation (Non-Medical):    Physical Activity:     Days of Exercise per Week:     Minutes of Exercise per Session:    Stress:     Feeling of Stress :    Social Connections:     Frequency of Communication with Friends and Family:     Frequency of Social Gatherings with Friends and Family:     Attends Church Services:     Active Member of Clubs or Organizations:     Attends Club or Organization Meetings:     Marital Status:    Intimate Partner Violence:     Fear of Current or Ex-Partner:     Emotionally Abused:     Physically Abused:     Sexually Abused:      No current facility-administered medications for this encounter.      Current Outpatient Medications   Medication Sig Dispense Refill    rOPINIRole (REQUIP) 0.25 MG tablet Take 1 tablet by mouth nightly 30 tablet 0    clonazePAM (KLONOPIN) 0.5 MG tablet Take 1 tablet by mouth 2 times daily for 15 days. Put on medi set 30 tablet 0    lactobacillus (CULTURELLE) capsule Take 1 capsule by mouth daily (with breakfast) Medi set 30 capsule 0    fenofibrate (TRIGLIDE) 160 MG tablet Take 1 tablet by mouth daily 30 tablet 0    fluPHENAZine HCl (PROLIXIN) 10 MG tablet Take 1 tablet by mouth daily Please put on medi set 30 tablet 0    OLANZapine (ZYPREXA) 10 MG tablet Take 1 tablet by mouth daily Medi set 30 tablet 0    propranolol (INDERAL) 20 MG tablet Take 1 tablet by mouth 2 times daily mediset 60 tablet 0    tamsulosin (FLOMAX) 0.4 MG capsule Take 1 capsule by mouth daily Medi set 30 capsule 0    pantoprazole (PROTONIX) 40 MG tablet Take 1 tablet by mouth every morning (before breakfast) mediset 30 tablet 0    sodium zirconium cyclosilicate (LOKELMA) 10 g PACK oral suspension Take 10 g by mouth 3 times daily 3 packet 0     No Known Allergies    REVIEW OF SYSTEMS  All systems reviewed, pertinent positives per HPI otherwise noted to be negative. PHYSICAL EXAM  BP (!) 132/90   Pulse 130   Temp 98.8 °F (37.1 °C) (Oral)   Resp 20   Ht 5' 6\" (1.676 m)   Wt 120 lb (54.4 kg)   SpO2 96%   BMI 19.37 kg/m²    GENERAL APPEARANCE: Awake and alert. Cooperative. in distress. HENT: Normocephalic. Atraumatic. Mucous membranes are moist  NECK: Supple. Full range of motion of the neck without stiffness or pain. EYES: PERRL. EOM's grossly intact. HEART/CHEST: Tachycardia, RR. No murmurs. LUNGS: Respirations unlabored. CTAB. Good air exchange. Speaking comfortably in full sentences. ABDOMEN: No tenderness. Soft. Non-distended. No masses. No organomegaly. No guarding or rebound. MUSCULOSKELETAL: No extremity edema. Compartments soft. No deformity. No tenderness in the extremities. All extremities neurovascularly intact. Patient is noted to be moving all extremities, in particular his legs.   He states that he cannot stop, however when asked to remain still so that I could examine him, patient was able to remain completely still while I listen to his heart and lungs  SKIN: Warm and dry. No acute rashes. NEUROLOGICAL: Alert and oriented x3. No gross facial drooping. Strength 5/5, sensation intact. PSYCHIATRIC: Appears anxious, denies suicidal ideation or homicidal ideation, does not appear to be responding to internal stimuli    LABS  I have reviewed all labs for this visit.    Results for orders placed or performed during the hospital encounter of 10/19/21   CBC Auto Differential   Result Value Ref Range    WBC 7.7 4.0 - 11.0 K/uL    RBC 4.14 (L) 4.20 - 5.90 M/uL    Hemoglobin 13.2 (L) 13.5 - 17.5 g/dL    Hematocrit 39.5 (L) 40.5 - 52.5 %    MCV 95.5 80.0 - 100.0 fL    MCH 31.8 26.0 - 34.0 pg    MCHC 33.3 31.0 - 36.0 g/dL    RDW 14.4 12.4 - 15.4 %    Platelets 358 506 - 107 K/uL    MPV 7.8 5.0 - 10.5 fL    Neutrophils % 65.5 %    Lymphocytes % 25.5 %    Monocytes % 6.1 %    Eosinophils % 2.0 %    Basophils % 0.9 %    Neutrophils Absolute 5.1 1.7 - 7.7 K/uL    Lymphocytes Absolute 2.0 1.0 - 5.1 K/uL    Monocytes Absolute 0.5 0.0 - 1.3 K/uL    Eosinophils Absolute 0.2 0.0 - 0.6 K/uL    Basophils Absolute 0.1 0.0 - 0.2 K/uL   Comprehensive Metabolic Panel w/ Reflex to MG   Result Value Ref Range    Sodium 140 136 - 145 mmol/L    Potassium reflex Magnesium 4.1 3.5 - 5.1 mmol/L    Chloride 107 99 - 110 mmol/L    CO2 16 (L) 21 - 32 mmol/L    Anion Gap 17 (H) 3 - 16    Glucose 113 (H) 70 - 99 mg/dL    BUN 18 7 - 20 mg/dL    CREATININE 1.6 (H) 0.9 - 1.3 mg/dL    GFR Non-African American 45 (A) >60    GFR  54 (A) >60    Calcium 9.0 8.3 - 10.6 mg/dL    Total Protein 7.1 6.4 - 8.2 g/dL    Albumin 4.1 3.4 - 5.0 g/dL    Albumin/Globulin Ratio 1.4 1.1 - 2.2    Total Bilirubin 0.5 0.0 - 1.0 mg/dL    Alkaline Phosphatase 93 40 - 129 U/L    ALT 8 (L) 10 - 40 U/L    AST 11 (L) 15 - 37 U/L    Globulin 3.0 Not Established g/dL   Acetaminophen level   Result Value Ref Range    Acetaminophen Level <5 (L) 10 - 30 ug/mL   Salicylate   Result Value Ref Range    Salicylate, Serum <8.4 (L) 15.0 - 30.0 mg/dL   Ethanol   Result Value Ref Range    Ethanol Lvl None Detected mg/dL   Drug screen multi urine   Result Value Ref Range    Amphetamine Screen, Urine Neg Negative <1000ng/mL    Barbiturate Screen, Ur Neg Negative <200 ng/mL    Benzodiazepine Screen, Urine Neg Negative <200 ng/mL    Cannabinoid Scrn, Ur POSITIVE (A) Negative <50 ng/mL    Cocaine Metabolite Screen, Urine Neg Negative <300 ng/mL    Opiate Scrn, Ur Neg Negative <300 ng/mL    PCP Screen, Urine Neg Negative <25 ng/mL    Methadone Screen, Urine Neg Negative <300 ng/mL    Propoxyphene Scrn, Ur Neg Negative <300 ng/mL    Oxycodone Urine Neg Negative <100 ng/ml    pH, UA 5.0     Drug Screen Comment: see below    CK   Result Value Ref Range    Total CK 93 39 - 308 U/L   EKG 12 Lead   Result Value Ref Range    Ventricular Rate 92 BPM    Atrial Rate 92 BPM    P-R Interval 132 ms    QRS Duration 94 ms    Q-T Interval 360 ms    QTc Calculation (Bazett) 445 ms    P Axis 52 degrees    R Axis 48 degrees    T Axis 67 degrees    Diagnosis       Normal sinus rhythmNormal ECGConfirmed by Maryanne Day MD, Zayra Galeas ([id number]) on 10/19/2021 5:24:30 PM           ED COURSE / MDM  Patient seen and evaluated. Old records reviewed. Labs and imaging reviewed and results discussed with patient. Overall, patient arrives in acute distress, presenting for anxiety, he states he also cannot stop his extremities from moving. Physical exam remarkable for tachycardia. Also notable that patient is able to remain completely still when asked by provider to be able to safely complete exam.     Laboratory studies obtained for medical clearance. Work-up showed:    ED Course as of Oct 19 2015   Tue Oct 19, 2021   1022 Acetaminophen, salicylate, and ethanol levels negative. [ER]   1022 Stable kidney dysfunction from 1 year ago.     [ER]   1023 Anion gap mildly elevated, bicarb low at 16, no other significant electrolyte abnormalities. [ER]   1023 Liver function testing unremarkable. [ER]   1023 Mild anemia at 13.2. No leukocytosis or thrombocytopenia. [ER]   7990 UDS positive for cannabinoids    [ER]   1048 CK within normal limits, low suspicion for rhabdomyolysis. [ER]   1119 The Ekg interpreted by me shows  normal sinus rhythm with a rate of 92  Axis is   Normal  QTc is  within an acceptable range  Intervals and Durations are unremarkable. ST Segments: no acute change  No significant change from prior EKG dated 4/23/20    [ER]   1122 Patient is cleared for psychiatric evaluation    [ER]      ED Course User Index  [ER] Alyse Morocho MD         During the patient's ED course, the patient was given:  Medications   0.9 % sodium chloride bolus (0 mLs IntraVENous Stopped 10/19/21 1027)   OLANZapine (ZYPREXA) injection 10 mg (10 mg IntraMUSCular Given 10/19/21 0933)   rOPINIRole (REQUIP) tablet 0.25 mg (0.25 mg Oral Given 10/19/21 0938)   sterile water injection (2.1 mLs  Given 10/19/21 0933)      Patient calmed down when given home dose of Zyprexa (though given IM). Also given Requip given restless movement of his legs. Patient reports that this movement could not be controlled, however even prior to receiving any medication he was able to control it when asked. Tachycardia improved with limiting activity, and completely resolved after fluid administration. Patient is now calm. I have performed a medical clearance examination on this patient. It is my opinion that no medical conditions were discovered that would preclude admission to a behavioral health unit or discharge home. I feel that the patient is medically stable for disposition by the behavioral health team at this time. Psychiatry social work evaluated the patient and staffed the case with attending psychiatrist Dr Mary Carmen Quesada. Decision made to discharge the patient.     CLINICAL IMPRESSION  1. Anxiety state    2. Restless legs        Blood pressure (!) 135/92, pulse 83, temperature 98.8 °F (37.1 °C), temperature source Oral, resp. rate 16, height 5' 6\" (1.676 m), weight 120 lb (54.4 kg), SpO2 100 %. DISPOSITION  Rob Obrien was discharged to home in stable condition. Patient was given scripts for the following medications. I counseled patient how to take these medications. Discharge Medication List as of 10/19/2021 12:34 PM          Follow-up with:  Wilma Thomas, APRN - [address] [id number] [facility]  [phone]    Call in 1 day      [facility] ED  [address] [facility]  [phone]  Go to   As needed, If symptoms worsen      DISCLAIMER: This chart was created using Dragon dictation software. Efforts were made by me to ensure accuracy, however some errors may be present due to limitations of this technology and occasionally words are not transcribed correctly.         Mehreen Ramos MD  10/19/21 2032

## 2021-10-19 NOTE — ED NOTES
Report given to BORA [facility], [address] [facility], RN  10/19/21 2686

## 2021-10-19 NOTE — ED NOTES
Pt requested meal tray. With provider approval, this nurse ordered pt food.      Miguel A Francis RN  10/19/21 1100

## 2021-10-19 NOTE — ED NOTES
Patient came back to [facility] accompanied by ED nurses. Patient refusing to change and states he does not need to be seen by Psych. Reports he came in for anxiety and panic attack. Reports he no longer needs to be here nor needs to be seen by us. Reports he came in on his own will and is not suicidal nor has thoughts of harming himself or others. Patient refusing treatment by us. Patient reports he wants to go home and was supposed to go to [facility] today. Reports he is on ACT team there. Dr. Tigist Rodriguez came to see patient. Patient continues to refuse however after talking with him for awhile agree 'd to be evaluated if we give him a cab ride home. Arlette PALACIO talking with patient while he is in chair eating lunch.       Catherine Ram, RN  10/19/21 6509

## 2021-10-20 NOTE — CARE COORDINATION
Attempted to make phone call to patient for the purpose of ED FU and there was no phone number listed just multiple zeros. He does not have a HIPAA form listing someone. Unable to reach patient. Plan  No further outreach is necessary    Gwyneth Dance.  Anthony Gardner RN, BSN, [address] [place] [address] Primary Care  [phone]

## 2022-01-01 ENCOUNTER — APPOINTMENT (OUTPATIENT)
Dept: GENERAL RADIOLOGY | Age: 57
End: 2022-01-01
Payer: COMMERCIAL

## 2022-01-01 ENCOUNTER — APPOINTMENT (OUTPATIENT)
Dept: GENERAL RADIOLOGY | Age: 57
DRG: 885 | End: 2022-01-01
Attending: PSYCHIATRY & NEUROLOGY
Payer: COMMERCIAL

## 2022-01-01 ENCOUNTER — APPOINTMENT (OUTPATIENT)
Dept: GENERAL RADIOLOGY | Age: 57
DRG: 682 | End: 2022-01-01
Payer: COMMERCIAL

## 2022-01-01 ENCOUNTER — APPOINTMENT (OUTPATIENT)
Dept: CT IMAGING | Age: 57
DRG: 682 | End: 2022-01-01
Payer: COMMERCIAL

## 2022-01-01 ENCOUNTER — HOSPITAL ENCOUNTER (INPATIENT)
Age: 57
LOS: 5 days | Discharge: ANOTHER HOSPITAL | DRG: 885 | End: 2022-09-25
Attending: PSYCHIATRY & NEUROLOGY | Admitting: PSYCHIATRY & NEUROLOGY
Payer: COMMERCIAL

## 2022-01-01 ENCOUNTER — APPOINTMENT (OUTPATIENT)
Dept: CT IMAGING | Age: 57
End: 2022-01-01
Payer: COMMERCIAL

## 2022-01-01 ENCOUNTER — HOSPITAL ENCOUNTER (EMERGENCY)
Age: 57
End: 2022-09-25
Attending: EMERGENCY MEDICINE | Admitting: INTERNAL MEDICINE
Payer: COMMERCIAL

## 2022-01-01 ENCOUNTER — HOSPITAL ENCOUNTER (INPATIENT)
Age: 57
LOS: 4 days | Discharge: PSYCHIATRIC OR SUBSTANCE USE FACILITY | DRG: 682 | End: 2022-09-20
Attending: STUDENT IN AN ORGANIZED HEALTH CARE EDUCATION/TRAINING PROGRAM | Admitting: INTERNAL MEDICINE
Payer: COMMERCIAL

## 2022-01-01 VITALS
HEIGHT: 66 IN | HEART RATE: 80 BPM | DIASTOLIC BLOOD PRESSURE: 32 MMHG | TEMPERATURE: 98.2 F | WEIGHT: 106.7 LBS | OXYGEN SATURATION: 100 % | BODY MASS INDEX: 17.15 KG/M2 | SYSTOLIC BLOOD PRESSURE: 78 MMHG | RESPIRATION RATE: 20 BRPM

## 2022-01-01 VITALS
TEMPERATURE: 94.8 F | HEART RATE: 139 BPM | DIASTOLIC BLOOD PRESSURE: 57 MMHG | OXYGEN SATURATION: 92 % | RESPIRATION RATE: 28 BRPM | SYSTOLIC BLOOD PRESSURE: 74 MMHG

## 2022-01-01 VITALS
OXYGEN SATURATION: 99 % | WEIGHT: 110 LBS | RESPIRATION RATE: 16 BRPM | HEART RATE: 111 BPM | BODY MASS INDEX: 17.68 KG/M2 | SYSTOLIC BLOOD PRESSURE: 131 MMHG | HEIGHT: 66 IN | TEMPERATURE: 97.5 F | DIASTOLIC BLOOD PRESSURE: 84 MMHG

## 2022-01-01 DIAGNOSIS — N19 UREMIA: ICD-10-CM

## 2022-01-01 DIAGNOSIS — A41.9 SEVERE SEPSIS (HCC): ICD-10-CM

## 2022-01-01 DIAGNOSIS — R65.20 SEVERE SEPSIS (HCC): ICD-10-CM

## 2022-01-01 DIAGNOSIS — R57.8 HEMORRHAGIC SHOCK (HCC): Primary | ICD-10-CM

## 2022-01-01 DIAGNOSIS — R62.7 FAILURE TO THRIVE IN ADULT: ICD-10-CM

## 2022-01-01 DIAGNOSIS — K92.0 HEMATEMESIS, PRESENCE OF NAUSEA NOT SPECIFIED: ICD-10-CM

## 2022-01-01 DIAGNOSIS — I46.9 CARDIAC ARREST (HCC): ICD-10-CM

## 2022-01-01 DIAGNOSIS — R77.8 ELEVATED TROPONIN: ICD-10-CM

## 2022-01-01 DIAGNOSIS — E87.1 HYPONATREMIA: ICD-10-CM

## 2022-01-01 DIAGNOSIS — E87.20 LACTIC ACIDOSIS: ICD-10-CM

## 2022-01-01 DIAGNOSIS — N17.9 ACUTE KIDNEY INJURY (HCC): Primary | ICD-10-CM

## 2022-01-01 DIAGNOSIS — K63.1 BOWEL PERFORATION (HCC): ICD-10-CM

## 2022-01-01 DIAGNOSIS — R45.851 SUICIDAL IDEATION: ICD-10-CM

## 2022-01-01 LAB
A/G RATIO: 0.5 (ref 1.1–2.2)
A/G RATIO: 1.1 (ref 1.1–2.2)
A/G RATIO: 1.5 (ref 1.1–2.2)
ABO/RH: NORMAL
ACETAMINOPHEN LEVEL: <5 UG/ML (ref 10–30)
ALBUMIN SERPL-MCNC: 1.4 G/DL (ref 3.4–5)
ALBUMIN SERPL-MCNC: 3 G/DL (ref 3.4–5)
ALBUMIN SERPL-MCNC: 3.2 G/DL (ref 3.4–5)
ALBUMIN SERPL-MCNC: 3.2 G/DL (ref 3.4–5)
ALBUMIN SERPL-MCNC: 3.3 G/DL (ref 3.4–5)
ALBUMIN SERPL-MCNC: 4 G/DL (ref 3.4–5)
ALBUMIN SERPL-MCNC: 4.3 G/DL (ref 3.4–5)
ALP BLD-CCNC: 109 U/L (ref 40–129)
ALP BLD-CCNC: 54 U/L (ref 40–129)
ALP BLD-CCNC: 90 U/L (ref 40–129)
ALT SERPL-CCNC: 10 U/L (ref 10–40)
ALT SERPL-CCNC: 11 U/L (ref 10–40)
ALT SERPL-CCNC: 23 U/L (ref 10–40)
AMPHETAMINE SCREEN, URINE: ABNORMAL
ANION GAP SERPL CALCULATED.3IONS-SCNC: 10 MMOL/L (ref 3–16)
ANION GAP SERPL CALCULATED.3IONS-SCNC: 17 MMOL/L (ref 3–16)
ANION GAP SERPL CALCULATED.3IONS-SCNC: 18 MMOL/L (ref 3–16)
ANION GAP SERPL CALCULATED.3IONS-SCNC: 24 MMOL/L (ref 3–16)
ANION GAP SERPL CALCULATED.3IONS-SCNC: 30 MMOL/L (ref 3–16)
ANION GAP SERPL CALCULATED.3IONS-SCNC: 34 MMOL/L (ref 3–16)
ANION GAP SERPL CALCULATED.3IONS-SCNC: 8 MMOL/L (ref 3–16)
ANION GAP SERPL CALCULATED.3IONS-SCNC: 9 MMOL/L (ref 3–16)
ANION GAP SERPL CALCULATED.3IONS-SCNC: 9 MMOL/L (ref 3–16)
ANISOCYTOSIS: ABNORMAL
ANTIBODY SCREEN: NORMAL
AST SERPL-CCNC: 15 U/L (ref 15–37)
AST SERPL-CCNC: 17 U/L (ref 15–37)
AST SERPL-CCNC: 40 U/L (ref 15–37)
ATYPICAL LYMPHOCYTE RELATIVE PERCENT: 3 % (ref 0–6)
BANDED NEUTROPHILS RELATIVE PERCENT: 15 % (ref 0–7)
BARBITURATE SCREEN URINE: ABNORMAL
BASE EXCESS VENOUS: -17 MMOL/L (ref -3–3)
BASOPHILS ABSOLUTE: 0 K/UL (ref 0–0.2)
BASOPHILS RELATIVE PERCENT: 0 %
BASOPHILS RELATIVE PERCENT: 0.1 %
BASOPHILS RELATIVE PERCENT: 0.4 %
BENZODIAZEPINE SCREEN, URINE: ABNORMAL
BILIRUB SERPL-MCNC: 0.4 MG/DL (ref 0–1)
BILIRUB SERPL-MCNC: 0.5 MG/DL (ref 0–1)
BILIRUB SERPL-MCNC: 0.6 MG/DL (ref 0–1)
BILIRUBIN URINE: NEGATIVE
BLOOD BANK DISPENSE STATUS: NORMAL
BLOOD BANK PRODUCT CODE: NORMAL
BLOOD, URINE: ABNORMAL
BPU ID: NORMAL
BUN BLDV-MCNC: 104 MG/DL (ref 7–20)
BUN BLDV-MCNC: 113 MG/DL (ref 7–20)
BUN BLDV-MCNC: 120 MG/DL (ref 7–20)
BUN BLDV-MCNC: 120 MG/DL (ref 7–20)
BUN BLDV-MCNC: 166 MG/DL (ref 7–20)
BUN BLDV-MCNC: 192 MG/DL (ref 7–20)
BUN BLDV-MCNC: 34 MG/DL (ref 7–20)
BUN BLDV-MCNC: 59 MG/DL (ref 7–20)
BUN BLDV-MCNC: 62 MG/DL (ref 7–20)
CALCIUM IONIZED: 0.96 MMOL/L (ref 1.12–1.32)
CALCIUM IONIZED: 1 MMOL/L (ref 1.12–1.32)
CALCIUM SERPL-MCNC: 6.2 MG/DL (ref 8.3–10.6)
CALCIUM SERPL-MCNC: 6.4 MG/DL (ref 8.3–10.6)
CALCIUM SERPL-MCNC: 7 MG/DL (ref 8.3–10.6)
CALCIUM SERPL-MCNC: 7.2 MG/DL (ref 8.3–10.6)
CALCIUM SERPL-MCNC: 7.5 MG/DL (ref 8.3–10.6)
CALCIUM SERPL-MCNC: 7.6 MG/DL (ref 8.3–10.6)
CALCIUM SERPL-MCNC: 7.8 MG/DL (ref 8.3–10.6)
CALCIUM SERPL-MCNC: 8.5 MG/DL (ref 8.3–10.6)
CALCIUM SERPL-MCNC: 9.1 MG/DL (ref 8.3–10.6)
CANNABINOID SCREEN URINE: POSITIVE
CARBOXYHEMOGLOBIN: 0.3 % (ref 0–1.5)
CHLORIDE BLD-SCNC: 100 MMOL/L (ref 99–110)
CHLORIDE BLD-SCNC: 102 MMOL/L (ref 99–110)
CHLORIDE BLD-SCNC: 74 MMOL/L (ref 99–110)
CHLORIDE BLD-SCNC: 81 MMOL/L (ref 99–110)
CHLORIDE BLD-SCNC: 84 MMOL/L (ref 99–110)
CHLORIDE BLD-SCNC: 85 MMOL/L (ref 99–110)
CHLORIDE BLD-SCNC: 91 MMOL/L (ref 99–110)
CHLORIDE BLD-SCNC: 92 MMOL/L (ref 99–110)
CHLORIDE BLD-SCNC: 95 MMOL/L (ref 99–110)
CLARITY: CLEAR
CO2: 14 MMOL/L (ref 21–32)
CO2: 17 MMOL/L (ref 21–32)
CO2: 24 MMOL/L (ref 21–32)
CO2: 26 MMOL/L (ref 21–32)
CO2: 26 MMOL/L (ref 21–32)
CO2: 28 MMOL/L (ref 21–32)
CO2: 29 MMOL/L (ref 21–32)
CO2: 30 MMOL/L (ref 21–32)
CO2: 30 MMOL/L (ref 21–32)
COCAINE METABOLITE SCREEN URINE: ABNORMAL
COLOR: YELLOW
CREAT SERPL-MCNC: 1.6 MG/DL (ref 0.9–1.3)
CREAT SERPL-MCNC: 1.8 MG/DL (ref 0.9–1.3)
CREAT SERPL-MCNC: 2.2 MG/DL (ref 0.9–1.3)
CREAT SERPL-MCNC: 2.4 MG/DL (ref 0.9–1.3)
CREAT SERPL-MCNC: 2.9 MG/DL (ref 0.9–1.3)
CREAT SERPL-MCNC: 3.1 MG/DL (ref 0.9–1.3)
CREAT SERPL-MCNC: 3.6 MG/DL (ref 0.9–1.3)
CREAT SERPL-MCNC: 4.6 MG/DL (ref 0.9–1.3)
CREAT SERPL-MCNC: 4.8 MG/DL (ref 0.9–1.3)
DESCRIPTION BLOOD BANK: NORMAL
EKG ATRIAL RATE: 103 BPM
EKG ATRIAL RATE: 156 BPM
EKG ATRIAL RATE: 192 BPM
EKG DIAGNOSIS: NORMAL
EKG P AXIS: 74 DEGREES
EKG P-R INTERVAL: 148 MS
EKG Q-T INTERVAL: 252 MS
EKG Q-T INTERVAL: 382 MS
EKG Q-T INTERVAL: 388 MS
EKG QRS DURATION: 166 MS
EKG QRS DURATION: 90 MS
EKG QRS DURATION: 96 MS
EKG QTC CALCULATION (BAZETT): 443 MS
EKG QTC CALCULATION (BAZETT): 508 MS
EKG QTC CALCULATION (BAZETT): 557 MS
EKG R AXIS: 56 DEGREES
EKG R AXIS: 61 DEGREES
EKG R AXIS: 69 DEGREES
EKG T AXIS: 14 DEGREES
EKG T AXIS: 68 DEGREES
EKG T AXIS: 68 DEGREES
EKG VENTRICULAR RATE: 103 BPM
EKG VENTRICULAR RATE: 128 BPM
EKG VENTRICULAR RATE: 186 BPM
EOSINOPHILS ABSOLUTE: 0 K/UL (ref 0–0.6)
EOSINOPHILS RELATIVE PERCENT: 0 %
EOSINOPHILS RELATIVE PERCENT: 0.1 %
EOSINOPHILS RELATIVE PERCENT: 0.4 %
ETHANOL: NORMAL MG/DL (ref 0–0.08)
FENTANYL SCREEN, URINE: ABNORMAL
GFR AFRICAN AMERICAN: 15
GFR AFRICAN AMERICAN: 16
GFR AFRICAN AMERICAN: 21
GFR AFRICAN AMERICAN: 25
GFR AFRICAN AMERICAN: 27
GFR AFRICAN AMERICAN: 34
GFR AFRICAN AMERICAN: 37
GFR AFRICAN AMERICAN: 47
GFR AFRICAN AMERICAN: 54
GFR NON-AFRICAN AMERICAN: 13
GFR NON-AFRICAN AMERICAN: 13
GFR NON-AFRICAN AMERICAN: 17
GFR NON-AFRICAN AMERICAN: 21
GFR NON-AFRICAN AMERICAN: 22
GFR NON-AFRICAN AMERICAN: 28
GFR NON-AFRICAN AMERICAN: 31
GFR NON-AFRICAN AMERICAN: 39
GFR NON-AFRICAN AMERICAN: 44
GLUCOSE BLD-MCNC: 118 MG/DL (ref 70–99)
GLUCOSE BLD-MCNC: 126 MG/DL (ref 70–99)
GLUCOSE BLD-MCNC: 153 MG/DL (ref 70–99)
GLUCOSE BLD-MCNC: 192 MG/DL (ref 70–99)
GLUCOSE BLD-MCNC: 262 MG/DL (ref 70–99)
GLUCOSE BLD-MCNC: 299 MG/DL (ref 70–99)
GLUCOSE BLD-MCNC: 85 MG/DL (ref 70–99)
GLUCOSE BLD-MCNC: 86 MG/DL (ref 70–99)
GLUCOSE BLD-MCNC: 90 MG/DL (ref 70–99)
GLUCOSE BLD-MCNC: 90 MG/DL (ref 70–99)
GLUCOSE BLD-MCNC: 91 MG/DL (ref 70–99)
GLUCOSE BLD-MCNC: 92 MG/DL (ref 70–99)
GLUCOSE BLD-MCNC: 97 MG/DL (ref 70–99)
GLUCOSE URINE: NEGATIVE MG/DL
HCO3 VENOUS: 13.8 MMOL/L (ref 23–29)
HCT VFR BLD CALC: 26.5 % (ref 40.5–52.5)
HCT VFR BLD CALC: 28.3 % (ref 40.5–52.5)
HCT VFR BLD CALC: 40.2 % (ref 40.5–52.5)
HCT VFR BLD CALC: 47.3 % (ref 40.5–52.5)
HEMATOLOGY PATH CONSULT: YES
HEMOGLOBIN: 13.6 G/DL (ref 13.5–17.5)
HEMOGLOBIN: 16.2 G/DL (ref 13.5–17.5)
HEMOGLOBIN: 8.7 G/DL (ref 13.5–17.5)
HEMOGLOBIN: 9.2 G/DL (ref 13.5–17.5)
INFLUENZA A: NOT DETECTED
INFLUENZA B: NOT DETECTED
KETONES, URINE: NEGATIVE MG/DL
LACTIC ACID: 15.1 MMOL/L (ref 0.4–2)
LEUKOCYTE ESTERASE, URINE: NEGATIVE
LYMPHOCYTES ABSOLUTE: 0.4 K/UL (ref 1–5.1)
LYMPHOCYTES ABSOLUTE: 0.5 K/UL (ref 1–5.1)
LYMPHOCYTES ABSOLUTE: 0.5 K/UL (ref 1–5.1)
LYMPHOCYTES RELATIVE PERCENT: 33 %
LYMPHOCYTES RELATIVE PERCENT: 4.2 %
LYMPHOCYTES RELATIVE PERCENT: 4.7 %
Lab: ABNORMAL
MAGNESIUM: 1.2 MG/DL (ref 1.8–2.4)
MAGNESIUM: 1.3 MG/DL (ref 1.8–2.4)
MAGNESIUM: 1.4 MG/DL (ref 1.8–2.4)
MAGNESIUM: 2.7 MG/DL (ref 1.8–2.4)
MAGNESIUM: 3.2 MG/DL (ref 1.8–2.4)
MCH RBC QN AUTO: 29.3 PG (ref 26–34)
MCH RBC QN AUTO: 29.9 PG (ref 26–34)
MCH RBC QN AUTO: 30.1 PG (ref 26–34)
MCH RBC QN AUTO: 30.1 PG (ref 26–34)
MCHC RBC AUTO-ENTMCNC: 32.6 G/DL (ref 31–36)
MCHC RBC AUTO-ENTMCNC: 32.8 G/DL (ref 31–36)
MCHC RBC AUTO-ENTMCNC: 33.8 G/DL (ref 31–36)
MCHC RBC AUTO-ENTMCNC: 34.1 G/DL (ref 31–36)
MCV RBC AUTO: 86.7 FL (ref 80–100)
MCV RBC AUTO: 88.2 FL (ref 80–100)
MCV RBC AUTO: 91.8 FL (ref 80–100)
MCV RBC AUTO: 91.8 FL (ref 80–100)
METAMYELOCYTES RELATIVE PERCENT: 4 %
METHADONE SCREEN, URINE: ABNORMAL
METHEMOGLOBIN VENOUS: 0.1 %
MICROSCOPIC EXAMINATION: YES
MONOCYTES ABSOLUTE: 0.1 K/UL (ref 0–1.3)
MONOCYTES ABSOLUTE: 0.5 K/UL (ref 0–1.3)
MONOCYTES ABSOLUTE: 0.7 K/UL (ref 0–1.3)
MONOCYTES RELATIVE PERCENT: 5.6 %
MONOCYTES RELATIVE PERCENT: 6.5 %
MONOCYTES RELATIVE PERCENT: 8 %
NEUTROPHILS ABSOLUTE: 0.8 K/UL (ref 1.7–7.7)
NEUTROPHILS ABSOLUTE: 8.2 K/UL (ref 1.7–7.7)
NEUTROPHILS ABSOLUTE: 9.1 K/UL (ref 1.7–7.7)
NEUTROPHILS RELATIVE PERCENT: 37 %
NEUTROPHILS RELATIVE PERCENT: 88 %
NEUTROPHILS RELATIVE PERCENT: 90 %
NITRITE, URINE: NEGATIVE
O2 SAT, VEN: 41 %
O2 THERAPY: ABNORMAL
OPIATE SCREEN URINE: ABNORMAL
OXYCODONE URINE: ABNORMAL
PCO2, VEN: 57.5 MMHG (ref 40–50)
PDW BLD-RTO: 13.2 % (ref 12.4–15.4)
PDW BLD-RTO: 13.2 % (ref 12.4–15.4)
PDW BLD-RTO: 13.6 % (ref 12.4–15.4)
PDW BLD-RTO: 14.1 % (ref 12.4–15.4)
PERFORMED ON: ABNORMAL
PERFORMED ON: NORMAL
PH UA: 5.5
PH UA: 5.5 (ref 5–8)
PH VENOUS: 7 (ref 7.35–7.45)
PH VENOUS: 7.45 (ref 7.35–7.45)
PH VENOUS: 7.47 (ref 7.35–7.45)
PHENCYCLIDINE SCREEN URINE: ABNORMAL
PHOSPHORUS: 1.3 MG/DL (ref 2.5–4.9)
PHOSPHORUS: 1.5 MG/DL (ref 2.5–4.9)
PHOSPHORUS: 1.7 MG/DL (ref 2.5–4.9)
PHOSPHORUS: 1.7 MG/DL (ref 2.5–4.9)
PLATELET # BLD: 128 K/UL (ref 135–450)
PLATELET # BLD: 130 K/UL (ref 135–450)
PLATELET # BLD: 197 K/UL (ref 135–450)
PLATELET # BLD: 227 K/UL (ref 135–450)
PLATELET SLIDE REVIEW: ABNORMAL
PMV BLD AUTO: 8.2 FL (ref 5–10.5)
PMV BLD AUTO: 8.7 FL (ref 5–10.5)
PMV BLD AUTO: 8.7 FL (ref 5–10.5)
PMV BLD AUTO: 9.3 FL (ref 5–10.5)
PO2, VEN: 34.3 MMHG (ref 25–40)
POIKILOCYTES: ABNORMAL
POTASSIUM REFLEX MAGNESIUM: 2.7 MMOL/L (ref 3.5–5.1)
POTASSIUM REFLEX MAGNESIUM: 3.5 MMOL/L (ref 3.5–5.1)
POTASSIUM REFLEX MAGNESIUM: 3.5 MMOL/L (ref 3.5–5.1)
POTASSIUM REFLEX MAGNESIUM: 3.8 MMOL/L (ref 3.5–5.1)
POTASSIUM SERPL-SCNC: 3 MMOL/L (ref 3.5–5.1)
POTASSIUM SERPL-SCNC: 3.1 MMOL/L (ref 3.5–5.1)
POTASSIUM SERPL-SCNC: 3.2 MMOL/L (ref 3.5–5.1)
POTASSIUM SERPL-SCNC: 3.3 MMOL/L (ref 3.5–5.1)
POTASSIUM SERPL-SCNC: 3.7 MMOL/L (ref 3.5–5.1)
POTASSIUM SERPL-SCNC: 4.2 MMOL/L (ref 3.5–5.1)
PRO-BNP: 9753 PG/ML (ref 0–124)
PROCALCITONIN: 0.37 NG/ML (ref 0–0.15)
PROTEIN UA: NEGATIVE MG/DL
RBC # BLD: 2.89 M/UL (ref 4.2–5.9)
RBC # BLD: 3.09 M/UL (ref 4.2–5.9)
RBC # BLD: 4.64 M/UL (ref 4.2–5.9)
RBC # BLD: 5.37 M/UL (ref 4.2–5.9)
REPORT: NORMAL
SALICYLATE, SERUM: <0.3 MG/DL (ref 15–30)
SARS-COV-2 RNA, RT PCR: NOT DETECTED
SARS-COV-2, NAAT: NOT DETECTED
SLIDE REVIEW: ABNORMAL
SODIUM BLD-SCNC: 127 MMOL/L (ref 136–145)
SODIUM BLD-SCNC: 128 MMOL/L (ref 136–145)
SODIUM BLD-SCNC: 129 MMOL/L (ref 136–145)
SODIUM BLD-SCNC: 132 MMOL/L (ref 136–145)
SODIUM BLD-SCNC: 135 MMOL/L (ref 136–145)
SODIUM BLD-SCNC: 135 MMOL/L (ref 136–145)
SODIUM BLD-SCNC: 137 MMOL/L (ref 136–145)
SPECIFIC GRAVITY UA: 1.01 (ref 1–1.03)
TCO2 CALC VENOUS: 16 MMOL/L
TOTAL CK: 132 U/L (ref 39–308)
TOTAL PROTEIN: 4.1 G/DL (ref 6.4–8.2)
TOTAL PROTEIN: 6.6 G/DL (ref 6.4–8.2)
TOTAL PROTEIN: 8.3 G/DL (ref 6.4–8.2)
TOXIC GRANULATION: PRESENT
TROPONIN: 0.03 NG/ML
TROPONIN: 0.04 NG/ML
TROPONIN: <0.01 NG/ML
TSH SERPL DL<=0.05 MIU/L-ACNC: 1.15 UIU/ML (ref 0.27–4.2)
URINE REFLEX TO CULTURE: ABNORMAL
URINE TYPE: ABNORMAL
UROBILINOGEN, URINE: 0.2 E.U./DL
WBC # BLD: 0.8 K/UL (ref 4–11)
WBC # BLD: 1.5 K/UL (ref 4–11)
WBC # BLD: 10.4 K/UL (ref 4–11)
WBC # BLD: 9.1 K/UL (ref 4–11)

## 2022-01-01 PROCEDURE — 2060000000 HC ICU INTERMEDIATE R&B

## 2022-01-01 PROCEDURE — 36415 COLL VENOUS BLD VENIPUNCTURE: CPT

## 2022-01-01 PROCEDURE — 1200000000 HC SEMI PRIVATE

## 2022-01-01 PROCEDURE — 85025 COMPLETE CBC W/AUTO DIFF WBC: CPT

## 2022-01-01 PROCEDURE — 82803 BLOOD GASES ANY COMBINATION: CPT

## 2022-01-01 PROCEDURE — 6370000000 HC RX 637 (ALT 250 FOR IP): Performed by: PSYCHIATRY & NEUROLOGY

## 2022-01-01 PROCEDURE — 6360000002 HC RX W HCPCS: Performed by: NURSE PRACTITIONER

## 2022-01-01 PROCEDURE — 71045 X-RAY EXAM CHEST 1 VIEW: CPT

## 2022-01-01 PROCEDURE — 82330 ASSAY OF CALCIUM: CPT

## 2022-01-01 PROCEDURE — 1240000000 HC EMOTIONAL WELLNESS R&B

## 2022-01-01 PROCEDURE — 2580000003 HC RX 258: Performed by: INTERNAL MEDICINE

## 2022-01-01 PROCEDURE — 80053 COMPREHEN METABOLIC PANEL: CPT

## 2022-01-01 PROCEDURE — 97530 THERAPEUTIC ACTIVITIES: CPT

## 2022-01-01 PROCEDURE — 74176 CT ABD & PELVIS W/O CONTRAST: CPT

## 2022-01-01 PROCEDURE — 99223 1ST HOSP IP/OBS HIGH 75: CPT | Performed by: PSYCHIATRY & NEUROLOGY

## 2022-01-01 PROCEDURE — 6360000002 HC RX W HCPCS: Performed by: INTERNAL MEDICINE

## 2022-01-01 PROCEDURE — 2580000003 HC RX 258: Performed by: EMERGENCY MEDICINE

## 2022-01-01 PROCEDURE — 85027 COMPLETE CBC AUTOMATED: CPT

## 2022-01-01 PROCEDURE — 96366 THER/PROPH/DIAG IV INF ADDON: CPT

## 2022-01-01 PROCEDURE — 99233 SBSQ HOSP IP/OBS HIGH 50: CPT | Performed by: INTERNAL MEDICINE

## 2022-01-01 PROCEDURE — 6360000002 HC RX W HCPCS: Performed by: EMERGENCY MEDICINE

## 2022-01-01 PROCEDURE — 83735 ASSAY OF MAGNESIUM: CPT

## 2022-01-01 PROCEDURE — 99285 EMERGENCY DEPT VISIT HI MDM: CPT

## 2022-01-01 PROCEDURE — 6370000000 HC RX 637 (ALT 250 FOR IP): Performed by: NURSE PRACTITIONER

## 2022-01-01 PROCEDURE — 93005 ELECTROCARDIOGRAM TRACING: CPT | Performed by: INTERNAL MEDICINE

## 2022-01-01 PROCEDURE — 94002 VENT MGMT INPAT INIT DAY: CPT

## 2022-01-01 PROCEDURE — 99239 HOSP IP/OBS DSCHRG MGMT >30: CPT | Performed by: INTERNAL MEDICINE

## 2022-01-01 PROCEDURE — 93005 ELECTROCARDIOGRAM TRACING: CPT | Performed by: EMERGENCY MEDICINE

## 2022-01-01 PROCEDURE — 80179 DRUG ASSAY SALICYLATE: CPT

## 2022-01-01 PROCEDURE — 74018 RADEX ABDOMEN 1 VIEW: CPT

## 2022-01-01 PROCEDURE — P9016 RBC LEUKOCYTES REDUCED: HCPCS

## 2022-01-01 PROCEDURE — 86923 COMPATIBILITY TEST ELECTRIC: CPT

## 2022-01-01 PROCEDURE — 6360000002 HC RX W HCPCS

## 2022-01-01 PROCEDURE — 86850 RBC ANTIBODY SCREEN: CPT

## 2022-01-01 PROCEDURE — 6370000000 HC RX 637 (ALT 250 FOR IP)

## 2022-01-01 PROCEDURE — 2500000003 HC RX 250 WO HCPCS: Performed by: INTERNAL MEDICINE

## 2022-01-01 PROCEDURE — 70450 CT HEAD/BRAIN W/O DYE: CPT

## 2022-01-01 PROCEDURE — 80307 DRUG TEST PRSMV CHEM ANLYZR: CPT

## 2022-01-01 PROCEDURE — 87040 BLOOD CULTURE FOR BACTERIA: CPT

## 2022-01-01 PROCEDURE — 36556 INSERT NON-TUNNEL CV CATH: CPT

## 2022-01-01 PROCEDURE — 80048 BASIC METABOLIC PNL TOTAL CA: CPT

## 2022-01-01 PROCEDURE — 93005 ELECTROCARDIOGRAM TRACING: CPT | Performed by: NURSE PRACTITIONER

## 2022-01-01 PROCEDURE — 97166 OT EVAL MOD COMPLEX 45 MIN: CPT

## 2022-01-01 PROCEDURE — 84443 ASSAY THYROID STIM HORMONE: CPT

## 2022-01-01 PROCEDURE — 96365 THER/PROPH/DIAG IV INF INIT: CPT

## 2022-01-01 PROCEDURE — 2500000003 HC RX 250 WO HCPCS

## 2022-01-01 PROCEDURE — 86900 BLOOD TYPING SEROLOGIC ABO: CPT

## 2022-01-01 PROCEDURE — 2500000003 HC RX 250 WO HCPCS: Performed by: EMERGENCY MEDICINE

## 2022-01-01 PROCEDURE — 84145 PROCALCITONIN (PCT): CPT

## 2022-01-01 PROCEDURE — 97161 PT EVAL LOW COMPLEX 20 MIN: CPT

## 2022-01-01 PROCEDURE — 80069 RENAL FUNCTION PANEL: CPT

## 2022-01-01 PROCEDURE — 84132 ASSAY OF SERUM POTASSIUM: CPT

## 2022-01-01 PROCEDURE — 51702 INSERT TEMP BLADDER CATH: CPT

## 2022-01-01 PROCEDURE — 82550 ASSAY OF CK (CPK): CPT

## 2022-01-01 PROCEDURE — 80143 DRUG ASSAY ACETAMINOPHEN: CPT

## 2022-01-01 PROCEDURE — 81001 URINALYSIS AUTO W/SCOPE: CPT

## 2022-01-01 PROCEDURE — 96372 THER/PROPH/DIAG INJ SC/IM: CPT

## 2022-01-01 PROCEDURE — 36430 TRANSFUSION BLD/BLD COMPNT: CPT

## 2022-01-01 PROCEDURE — 81003 URINALYSIS AUTO W/O SCOPE: CPT

## 2022-01-01 PROCEDURE — 84484 ASSAY OF TROPONIN QUANT: CPT

## 2022-01-01 PROCEDURE — 6370000000 HC RX 637 (ALT 250 FOR IP): Performed by: INTERNAL MEDICINE

## 2022-01-01 PROCEDURE — 93010 ELECTROCARDIOGRAM REPORT: CPT | Performed by: INTERNAL MEDICINE

## 2022-01-01 PROCEDURE — 96375 TX/PRO/DX INJ NEW DRUG ADDON: CPT

## 2022-01-01 PROCEDURE — 87150 DNA/RNA AMPLIFIED PROBE: CPT

## 2022-01-01 PROCEDURE — 87186 SC STD MICRODIL/AGAR DIL: CPT

## 2022-01-01 PROCEDURE — 99233 SBSQ HOSP IP/OBS HIGH 50: CPT | Performed by: PSYCHIATRY & NEUROLOGY

## 2022-01-01 PROCEDURE — 82077 ASSAY SPEC XCP UR&BREATH IA: CPT

## 2022-01-01 PROCEDURE — 96367 TX/PROPH/DG ADDL SEQ IV INF: CPT

## 2022-01-01 PROCEDURE — 99232 SBSQ HOSP IP/OBS MODERATE 35: CPT | Performed by: INTERNAL MEDICINE

## 2022-01-01 PROCEDURE — 97535 SELF CARE MNGMENT TRAINING: CPT

## 2022-01-01 PROCEDURE — 83880 ASSAY OF NATRIURETIC PEPTIDE: CPT

## 2022-01-01 PROCEDURE — 96376 TX/PRO/DX INJ SAME DRUG ADON: CPT

## 2022-01-01 PROCEDURE — 87635 SARS-COV-2 COVID-19 AMP PRB: CPT

## 2022-01-01 PROCEDURE — 99232 SBSQ HOSP IP/OBS MODERATE 35: CPT | Performed by: PSYCHIATRY & NEUROLOGY

## 2022-01-01 PROCEDURE — 86901 BLOOD TYPING SEROLOGIC RH(D): CPT

## 2022-01-01 PROCEDURE — 2580000003 HC RX 258: Performed by: NURSE PRACTITIONER

## 2022-01-01 PROCEDURE — 99223 1ST HOSP IP/OBS HIGH 75: CPT

## 2022-01-01 PROCEDURE — 83605 ASSAY OF LACTIC ACID: CPT

## 2022-01-01 PROCEDURE — 87636 SARSCOV2 & INF A&B AMP PRB: CPT

## 2022-01-01 PROCEDURE — C9113 INJ PANTOPRAZOLE SODIUM, VIA: HCPCS | Performed by: EMERGENCY MEDICINE

## 2022-01-01 RX ORDER — ACETAMINOPHEN 325 MG/1
650 TABLET ORAL EVERY 4 HOURS PRN
Status: CANCELLED | OUTPATIENT
Start: 2022-01-01

## 2022-01-01 RX ORDER — ONDANSETRON 2 MG/ML
4 INJECTION INTRAMUSCULAR; INTRAVENOUS EVERY 6 HOURS PRN
Status: DISCONTINUED | OUTPATIENT
Start: 2022-01-01 | End: 2022-01-01

## 2022-01-01 RX ORDER — POLYETHYLENE GLYCOL 3350 17 G/17G
17 POWDER, FOR SOLUTION ORAL DAILY PRN
Status: CANCELLED | OUTPATIENT
Start: 2022-01-01

## 2022-01-01 RX ORDER — DIPHENHYDRAMINE HYDROCHLORIDE 50 MG/ML
50 INJECTION INTRAMUSCULAR; INTRAVENOUS EVERY 4 HOURS PRN
Status: CANCELLED | OUTPATIENT
Start: 2022-01-01

## 2022-01-01 RX ORDER — LANOLIN ALCOHOL/MO/W.PET/CERES
3 CREAM (GRAM) TOPICAL NIGHTLY PRN
Status: CANCELLED | OUTPATIENT
Start: 2022-01-01

## 2022-01-01 RX ORDER — POTASSIUM CHLORIDE 7.45 MG/ML
10 INJECTION INTRAVENOUS PRN
Status: CANCELLED | OUTPATIENT
Start: 2022-01-01

## 2022-01-01 RX ORDER — POLYETHYLENE GLYCOL 3350 17 G
2 POWDER IN PACKET (EA) ORAL
Status: CANCELLED | OUTPATIENT
Start: 2022-01-01

## 2022-01-01 RX ORDER — VENLAFAXINE HYDROCHLORIDE 37.5 MG/1
37.5 CAPSULE, EXTENDED RELEASE ORAL
Qty: 30 CAPSULE | Refills: 0 | Status: SHIPPED | OUTPATIENT
Start: 2022-01-01

## 2022-01-01 RX ORDER — POTASSIUM CHLORIDE 7.45 MG/ML
10 INJECTION INTRAVENOUS
Status: COMPLETED | OUTPATIENT
Start: 2022-01-01 | End: 2022-01-01

## 2022-01-01 RX ORDER — BISACODYL 10 MG
10 SUPPOSITORY, RECTAL RECTAL DAILY PRN
Status: DISCONTINUED | OUTPATIENT
Start: 2022-01-01 | End: 2022-01-01 | Stop reason: HOSPADM

## 2022-01-01 RX ORDER — POLYETHYLENE GLYCOL 3350 17 G
2 POWDER IN PACKET (EA) ORAL
Status: DISCONTINUED | OUTPATIENT
Start: 2022-01-01 | End: 2022-01-01 | Stop reason: HOSPADM

## 2022-01-01 RX ORDER — LORAZEPAM 2 MG/ML
INJECTION INTRAMUSCULAR
Status: COMPLETED
Start: 2022-01-01 | End: 2022-01-01

## 2022-01-01 RX ORDER — EPINEPHRINE 0.1 MG/ML
SYRINGE (ML) INJECTION
Status: COMPLETED | OUTPATIENT
Start: 2022-01-01 | End: 2022-01-01

## 2022-01-01 RX ORDER — VENLAFAXINE HYDROCHLORIDE 37.5 MG/1
37.5 CAPSULE, EXTENDED RELEASE ORAL NIGHTLY
Status: DISCONTINUED | OUTPATIENT
Start: 2022-01-01 | End: 2022-01-01

## 2022-01-01 RX ORDER — SODIUM CHLORIDE 9 MG/ML
INJECTION, SOLUTION INTRAVENOUS PRN
Status: DISCONTINUED | OUTPATIENT
Start: 2022-01-01 | End: 2022-01-01 | Stop reason: HOSPADM

## 2022-01-01 RX ORDER — PANTOPRAZOLE SODIUM 40 MG/10ML
40 INJECTION, POWDER, LYOPHILIZED, FOR SOLUTION INTRAVENOUS ONCE
Status: COMPLETED | OUTPATIENT
Start: 2022-01-01 | End: 2022-01-01

## 2022-01-01 RX ORDER — NICOTINE 21 MG/24HR
1 PATCH, TRANSDERMAL 24 HOURS TRANSDERMAL DAILY
Status: CANCELLED | OUTPATIENT
Start: 2022-01-01

## 2022-01-01 RX ORDER — HALOPERIDOL 10 MG/1
10 TABLET ORAL NIGHTLY
Status: ON HOLD | COMMUNITY
End: 2022-01-01 | Stop reason: SDUPTHER

## 2022-01-01 RX ORDER — HALOPERIDOL 5 MG
10 TABLET ORAL NIGHTLY
Status: DISCONTINUED | OUTPATIENT
Start: 2022-01-01 | End: 2022-01-01

## 2022-01-01 RX ORDER — POTASSIUM CHLORIDE 7.45 MG/ML
10 INJECTION INTRAVENOUS PRN
Status: DISCONTINUED | OUTPATIENT
Start: 2022-01-01 | End: 2022-01-01

## 2022-01-01 RX ORDER — 0.9 % SODIUM CHLORIDE 0.9 %
INTRAVENOUS SOLUTION INTRAVENOUS CONTINUOUS PRN
Status: COMPLETED | OUTPATIENT
Start: 2022-01-01 | End: 2022-01-01

## 2022-01-01 RX ORDER — POTASSIUM BICARBONATE 25 MEQ/1
50 TABLET, EFFERVESCENT ORAL ONCE
Status: COMPLETED | OUTPATIENT
Start: 2022-01-01 | End: 2022-01-01

## 2022-01-01 RX ORDER — ONDANSETRON 4 MG/1
4 TABLET, ORALLY DISINTEGRATING ORAL EVERY 8 HOURS PRN
Status: DISCONTINUED | OUTPATIENT
Start: 2022-01-01 | End: 2022-01-01

## 2022-01-01 RX ORDER — HALOPERIDOL 10 MG/1
10 TABLET ORAL NIGHTLY
Status: DISCONTINUED | OUTPATIENT
Start: 2022-01-01 | End: 2022-01-01 | Stop reason: HOSPADM

## 2022-01-01 RX ORDER — OLANZAPINE 5 MG/1
5 TABLET ORAL EVERY 4 HOURS PRN
Status: CANCELLED | OUTPATIENT
Start: 2022-01-01

## 2022-01-01 RX ORDER — POTASSIUM CHLORIDE 750 MG/1
40 TABLET, EXTENDED RELEASE ORAL ONCE
Status: DISCONTINUED | OUTPATIENT
Start: 2022-01-01 | End: 2022-01-01

## 2022-01-01 RX ORDER — MAGNESIUM SULFATE HEPTAHYDRATE 500 MG/ML
2000 INJECTION, SOLUTION INTRAMUSCULAR; INTRAVENOUS ONCE
Status: DISCONTINUED | OUTPATIENT
Start: 2022-01-01 | End: 2022-01-01 | Stop reason: CLARIF

## 2022-01-01 RX ORDER — TRAZODONE HYDROCHLORIDE 150 MG/1
150 TABLET ORAL NIGHTLY
Status: ON HOLD | COMMUNITY
End: 2022-01-01 | Stop reason: SDUPTHER

## 2022-01-01 RX ORDER — POLYETHYLENE GLYCOL 3350 17 G/17G
17 POWDER, FOR SOLUTION ORAL DAILY PRN
Status: DISCONTINUED | OUTPATIENT
Start: 2022-01-01 | End: 2022-01-01 | Stop reason: HOSPADM

## 2022-01-01 RX ORDER — VENLAFAXINE HYDROCHLORIDE 37.5 MG/1
37.5 CAPSULE, EXTENDED RELEASE ORAL
Status: DISCONTINUED | OUTPATIENT
Start: 2022-01-01 | End: 2022-01-01

## 2022-01-01 RX ORDER — SODIUM CHLORIDE, SODIUM LACTATE, POTASSIUM CHLORIDE, CALCIUM CHLORIDE 600; 310; 30; 20 MG/100ML; MG/100ML; MG/100ML; MG/100ML
INJECTION, SOLUTION INTRAVENOUS CONTINUOUS
Status: DISCONTINUED | OUTPATIENT
Start: 2022-01-01 | End: 2022-01-01

## 2022-01-01 RX ORDER — HALOPERIDOL 5 MG
10 TABLET ORAL NIGHTLY
Status: CANCELLED | OUTPATIENT
Start: 2022-01-01

## 2022-01-01 RX ORDER — METRONIDAZOLE 500 MG/100ML
500 INJECTION, SOLUTION INTRAVENOUS EVERY 8 HOURS
Status: CANCELLED | OUTPATIENT
Start: 2022-01-01

## 2022-01-01 RX ORDER — MIDAZOLAM HYDROCHLORIDE 1 MG/ML
2 INJECTION INTRAMUSCULAR; INTRAVENOUS
Status: DISCONTINUED | OUTPATIENT
Start: 2022-01-01 | End: 2022-01-01 | Stop reason: HOSPADM

## 2022-01-01 RX ORDER — 0.9 % SODIUM CHLORIDE 0.9 %
1000 INTRAVENOUS SOLUTION INTRAVENOUS ONCE
Status: CANCELLED | OUTPATIENT
Start: 2022-01-01

## 2022-01-01 RX ORDER — HALOPERIDOL 5 MG/ML
2 INJECTION INTRAMUSCULAR EVERY 8 HOURS
Status: CANCELLED | OUTPATIENT
Start: 2022-01-01

## 2022-01-01 RX ORDER — VENLAFAXINE HYDROCHLORIDE 37.5 MG/1
37.5 CAPSULE, EXTENDED RELEASE ORAL NIGHTLY
Status: CANCELLED | OUTPATIENT
Start: 2022-01-01

## 2022-01-01 RX ORDER — PROCHLORPERAZINE EDISYLATE 5 MG/ML
10 INJECTION INTRAMUSCULAR; INTRAVENOUS EVERY 6 HOURS PRN
Status: DISCONTINUED | OUTPATIENT
Start: 2022-01-01 | End: 2022-01-01 | Stop reason: HOSPADM

## 2022-01-01 RX ORDER — MAGNESIUM SULFATE 1 G/100ML
1000 INJECTION INTRAVENOUS PRN
Status: CANCELLED | OUTPATIENT
Start: 2022-01-01

## 2022-01-01 RX ORDER — VENLAFAXINE HYDROCHLORIDE 37.5 MG/1
37.5 CAPSULE, EXTENDED RELEASE ORAL
Status: CANCELLED | OUTPATIENT
Start: 2022-01-01

## 2022-01-01 RX ORDER — POTASSIUM CHLORIDE 750 MG/1
20 TABLET, EXTENDED RELEASE ORAL ONCE
Status: DISCONTINUED | OUTPATIENT
Start: 2022-01-01 | End: 2022-01-01

## 2022-01-01 RX ORDER — LANOLIN ALCOHOL/MO/W.PET/CERES
400 CREAM (GRAM) TOPICAL 2 TIMES DAILY
Status: DISCONTINUED | OUTPATIENT
Start: 2022-01-01 | End: 2022-01-01 | Stop reason: HOSPADM

## 2022-01-01 RX ORDER — MAGNESIUM HYDROXIDE/ALUMINUM HYDROXICE/SIMETHICONE 120; 1200; 1200 MG/30ML; MG/30ML; MG/30ML
30 SUSPENSION ORAL EVERY 6 HOURS PRN
Status: DISCONTINUED | OUTPATIENT
Start: 2022-01-01 | End: 2022-01-01 | Stop reason: HOSPADM

## 2022-01-01 RX ORDER — HYDROXYZINE 50 MG/1
50 TABLET, FILM COATED ORAL 3 TIMES DAILY PRN
Status: DISCONTINUED | OUTPATIENT
Start: 2022-01-01 | End: 2022-01-01 | Stop reason: HOSPADM

## 2022-01-01 RX ORDER — HALOPERIDOL 10 MG/1
10 TABLET ORAL NIGHTLY
Qty: 30 TABLET | Refills: 0 | Status: SHIPPED | OUTPATIENT
Start: 2022-01-01

## 2022-01-01 RX ORDER — DIPHENHYDRAMINE HYDROCHLORIDE 50 MG/ML
50 INJECTION INTRAMUSCULAR; INTRAVENOUS EVERY 4 HOURS PRN
Status: DISCONTINUED | OUTPATIENT
Start: 2022-01-01 | End: 2022-01-01 | Stop reason: HOSPADM

## 2022-01-01 RX ORDER — POTASSIUM CHLORIDE 20 MEQ/1
40 TABLET, EXTENDED RELEASE ORAL ONCE
Status: COMPLETED | OUTPATIENT
Start: 2022-01-01 | End: 2022-01-01

## 2022-01-01 RX ORDER — ACETAMINOPHEN 650 MG/1
650 SUPPOSITORY RECTAL EVERY 6 HOURS PRN
OUTPATIENT
Start: 2022-01-01

## 2022-01-01 RX ORDER — HEPARIN SODIUM 5000 [USP'U]/ML
5000 INJECTION, SOLUTION INTRAVENOUS; SUBCUTANEOUS 2 TIMES DAILY
Status: DISCONTINUED | OUTPATIENT
Start: 2022-01-01 | End: 2022-01-01 | Stop reason: HOSPADM

## 2022-01-01 RX ORDER — SODIUM CHLORIDE 9 MG/ML
INJECTION, SOLUTION INTRAVENOUS CONTINUOUS
Status: DISCONTINUED | OUTPATIENT
Start: 2022-01-01 | End: 2022-01-01 | Stop reason: HOSPADM

## 2022-01-01 RX ORDER — 0.9 % SODIUM CHLORIDE 0.9 %
1000 INTRAVENOUS SOLUTION INTRAVENOUS ONCE
Status: COMPLETED | OUTPATIENT
Start: 2022-01-01 | End: 2022-01-01

## 2022-01-01 RX ORDER — 0.9 % SODIUM CHLORIDE 0.9 %
30 INTRAVENOUS SOLUTION INTRAVENOUS ONCE
Status: COMPLETED | OUTPATIENT
Start: 2022-01-01 | End: 2022-01-01

## 2022-01-01 RX ORDER — MORPHINE SULFATE 4 MG/ML
4 INJECTION, SOLUTION INTRAMUSCULAR; INTRAVENOUS
Status: DISCONTINUED | OUTPATIENT
Start: 2022-01-01 | End: 2022-01-01 | Stop reason: HOSPADM

## 2022-01-01 RX ORDER — ACETAMINOPHEN 325 MG/1
650 TABLET ORAL EVERY 6 HOURS PRN
OUTPATIENT
Start: 2022-01-01

## 2022-01-01 RX ORDER — OLANZAPINE 5 MG/1
5 TABLET ORAL EVERY 4 HOURS PRN
Status: DISCONTINUED | OUTPATIENT
Start: 2022-01-01 | End: 2022-01-01 | Stop reason: HOSPADM

## 2022-01-01 RX ORDER — SODIUM CHLORIDE 9 MG/ML
INJECTION, SOLUTION INTRAVENOUS CONTINUOUS
Status: DISCONTINUED | OUTPATIENT
Start: 2022-01-01 | End: 2022-01-01

## 2022-01-01 RX ORDER — FENTANYL CITRATE 50 UG/ML
100 INJECTION, SOLUTION INTRAMUSCULAR; INTRAVENOUS ONCE
Status: COMPLETED | OUTPATIENT
Start: 2022-01-01 | End: 2022-01-01

## 2022-01-01 RX ORDER — SODIUM CHLORIDE 0.9 % (FLUSH) 0.9 %
5-40 SYRINGE (ML) INJECTION EVERY 12 HOURS SCHEDULED
Status: DISCONTINUED | OUTPATIENT
Start: 2022-01-01 | End: 2022-01-01 | Stop reason: HOSPADM

## 2022-01-01 RX ORDER — SODIUM CHLORIDE 9 MG/ML
INJECTION, SOLUTION INTRAVENOUS CONTINUOUS
Status: CANCELLED | OUTPATIENT
Start: 2022-01-01

## 2022-01-01 RX ORDER — VENLAFAXINE HYDROCHLORIDE 37.5 MG/1
37.5 CAPSULE, EXTENDED RELEASE ORAL
Status: DISCONTINUED | OUTPATIENT
Start: 2022-01-01 | End: 2022-01-01 | Stop reason: HOSPADM

## 2022-01-01 RX ORDER — ACETAMINOPHEN 650 MG/1
650 SUPPOSITORY RECTAL EVERY 6 HOURS PRN
Status: DISCONTINUED | OUTPATIENT
Start: 2022-01-01 | End: 2022-01-01 | Stop reason: HOSPADM

## 2022-01-01 RX ORDER — LANOLIN ALCOHOL/MO/W.PET/CERES
3 CREAM (GRAM) TOPICAL NIGHTLY PRN
Status: DISCONTINUED | OUTPATIENT
Start: 2022-01-01 | End: 2022-01-01 | Stop reason: HOSPADM

## 2022-01-01 RX ORDER — TRAZODONE HYDROCHLORIDE 150 MG/1
150 TABLET ORAL NIGHTLY
Qty: 15 TABLET | Refills: 0 | Status: SHIPPED | OUTPATIENT
Start: 2022-01-01

## 2022-01-01 RX ORDER — NICOTINE 21 MG/24HR
1 PATCH, TRANSDERMAL 24 HOURS TRANSDERMAL DAILY
Status: DISCONTINUED | OUTPATIENT
Start: 2022-01-01 | End: 2022-01-01 | Stop reason: HOSPADM

## 2022-01-01 RX ORDER — ACETAMINOPHEN 325 MG/1
650 TABLET ORAL EVERY 4 HOURS PRN
Status: DISCONTINUED | OUTPATIENT
Start: 2022-01-01 | End: 2022-01-01 | Stop reason: HOSPADM

## 2022-01-01 RX ORDER — DEXTROSE AND SODIUM CHLORIDE 5; .45 G/100ML; G/100ML
INJECTION, SOLUTION INTRAVENOUS CONTINUOUS PRN
Status: CANCELLED | OUTPATIENT
Start: 2022-01-01

## 2022-01-01 RX ORDER — HALOPERIDOL 5 MG/ML
2.5 INJECTION INTRAMUSCULAR ONCE
Status: COMPLETED | OUTPATIENT
Start: 2022-01-01 | End: 2022-01-01

## 2022-01-01 RX ORDER — METOPROLOL TARTRATE 5 MG/5ML
INJECTION INTRAVENOUS
Status: DISCONTINUED
Start: 2022-01-01 | End: 2022-01-01 | Stop reason: HOSPADM

## 2022-01-01 RX ORDER — CALCIUM GLUCONATE 20 MG/ML
2000 INJECTION, SOLUTION INTRAVENOUS ONCE
Status: COMPLETED | OUTPATIENT
Start: 2022-01-01 | End: 2022-01-01

## 2022-01-01 RX ORDER — AMIODARONE HYDROCHLORIDE 50 MG/ML
INJECTION, SOLUTION INTRAVENOUS
Status: COMPLETED | OUTPATIENT
Start: 2022-01-01 | End: 2022-01-01

## 2022-01-01 RX ORDER — MAGNESIUM SULFATE IN WATER 40 MG/ML
2000 INJECTION, SOLUTION INTRAVENOUS ONCE
Status: COMPLETED | OUTPATIENT
Start: 2022-01-01 | End: 2022-01-01

## 2022-01-01 RX ORDER — SODIUM CHLORIDE 0.9 % (FLUSH) 0.9 %
5-40 SYRINGE (ML) INJECTION PRN
Status: DISCONTINUED | OUTPATIENT
Start: 2022-01-01 | End: 2022-01-01 | Stop reason: HOSPADM

## 2022-01-01 RX ORDER — DIPHENHYDRAMINE HYDROCHLORIDE 50 MG/ML
25 INJECTION INTRAMUSCULAR; INTRAVENOUS ONCE
Status: COMPLETED | OUTPATIENT
Start: 2022-01-01 | End: 2022-01-01

## 2022-01-01 RX ORDER — MAGNESIUM SULFATE IN WATER 40 MG/ML
2000 INJECTION, SOLUTION INTRAVENOUS PRN
Status: DISCONTINUED | OUTPATIENT
Start: 2022-01-01 | End: 2022-01-01

## 2022-01-01 RX ORDER — HYDROXYZINE 50 MG/1
50 TABLET, FILM COATED ORAL 3 TIMES DAILY PRN
Status: CANCELLED | OUTPATIENT
Start: 2022-01-01

## 2022-01-01 RX ORDER — MAGNESIUM HYDROXIDE/ALUMINUM HYDROXICE/SIMETHICONE 120; 1200; 1200 MG/30ML; MG/30ML; MG/30ML
30 SUSPENSION ORAL EVERY 6 HOURS PRN
Status: CANCELLED | OUTPATIENT
Start: 2022-01-01

## 2022-01-01 RX ORDER — ACETAMINOPHEN 325 MG/1
650 TABLET ORAL EVERY 6 HOURS PRN
Status: DISCONTINUED | OUTPATIENT
Start: 2022-01-01 | End: 2022-01-01 | Stop reason: HOSPADM

## 2022-01-01 RX ORDER — VENLAFAXINE HYDROCHLORIDE 37.5 MG/1
37.5 CAPSULE, EXTENDED RELEASE ORAL NIGHTLY
Status: ON HOLD | COMMUNITY
End: 2022-01-01 | Stop reason: SDUPTHER

## 2022-01-01 RX ADMIN — EPINEPHRINE 1 MG: 0.1 INJECTION, SOLUTION ENDOTRACHEAL; INTRACARDIAC; INTRAVENOUS at 03:33

## 2022-01-01 RX ADMIN — Medication 10 MEQ: at 17:05

## 2022-01-01 RX ADMIN — EPINEPHRINE 1 MCG/MIN: 1 INJECTION INTRAMUSCULAR; INTRAVENOUS; SUBCUTANEOUS at 04:59

## 2022-01-01 RX ADMIN — Medication 10 MEQ: at 19:01

## 2022-01-01 RX ADMIN — SODIUM BICARBONATE 50 MEQ: 84 INJECTION, SOLUTION INTRAVENOUS at 03:34

## 2022-01-01 RX ADMIN — TRAZODONE HYDROCHLORIDE 150 MG: 100 TABLET ORAL at 20:54

## 2022-01-01 RX ADMIN — Medication 10 MEQ: at 13:37

## 2022-01-01 RX ADMIN — CALCIUM GLUCONATE 1000 MG: 98 INJECTION, SOLUTION INTRAVENOUS at 13:35

## 2022-01-01 RX ADMIN — SODIUM CHLORIDE 1000 ML: 9 INJECTION, SOLUTION INTRAVENOUS at 20:50

## 2022-01-01 RX ADMIN — SODIUM BICARBONATE 50 MEQ: 84 INJECTION, SOLUTION INTRAVENOUS at 03:36

## 2022-01-01 RX ADMIN — POTASSIUM PHOSPHATE, MONOBASIC AND POTASSIUM PHOSPHATE, DIBASIC 15 MMOL: 224; 236 INJECTION, SOLUTION, CONCENTRATE INTRAVENOUS at 09:34

## 2022-01-01 RX ADMIN — LORAZEPAM 2 MG: 2 INJECTION, SOLUTION INTRAMUSCULAR; INTRAVENOUS at 08:30

## 2022-01-01 RX ADMIN — VENLAFAXINE HYDROCHLORIDE 37.5 MG: 37.5 CAPSULE, EXTENDED RELEASE ORAL at 22:09

## 2022-01-01 RX ADMIN — CALCIUM GLUCONATE 2000 MG: 20 INJECTION, SOLUTION INTRAVENOUS at 15:21

## 2022-01-01 RX ADMIN — POTASSIUM CHLORIDE 10 MEQ: 7.46 INJECTION, SOLUTION INTRAVENOUS at 01:29

## 2022-01-01 RX ADMIN — SODIUM CHLORIDE: 9 INJECTION, SOLUTION INTRAVENOUS at 18:03

## 2022-01-01 RX ADMIN — AMIODARONE HYDROCHLORIDE 150 MG: 50 INJECTION, SOLUTION INTRAVENOUS at 03:52

## 2022-01-01 RX ADMIN — SODIUM CHLORIDE: 9 INJECTION, SOLUTION INTRAVENOUS at 10:03

## 2022-01-01 RX ADMIN — MAGNESIUM SULFATE HEPTAHYDRATE 2000 MG: 2 INJECTION, SOLUTION INTRAVENOUS at 09:27

## 2022-01-01 RX ADMIN — SODIUM CHLORIDE, PRESERVATIVE FREE 10 ML: 5 INJECTION INTRAVENOUS at 09:51

## 2022-01-01 RX ADMIN — POTASSIUM BICARBONATE 50 MEQ: 977.5 TABLET, EFFERVESCENT ORAL at 06:10

## 2022-01-01 RX ADMIN — DIBASIC SODIUM PHOSPHATE, MONOBASIC POTASSIUM PHOSPHATE AND MONOBASIC SODIUM PHOSPHATE 2 TABLET: 852; 155; 130 TABLET ORAL at 20:36

## 2022-01-01 RX ADMIN — Medication 10 MEQ: at 18:05

## 2022-01-01 RX ADMIN — POTASSIUM CHLORIDE 10 MEQ: 7.46 INJECTION, SOLUTION INTRAVENOUS at 02:26

## 2022-01-01 RX ADMIN — CEFEPIME 2000 MG: 2 INJECTION, POWDER, FOR SOLUTION INTRAVENOUS at 05:41

## 2022-01-01 RX ADMIN — VENLAFAXINE HYDROCHLORIDE 37.5 MG: 37.5 CAPSULE, EXTENDED RELEASE ORAL at 08:01

## 2022-01-01 RX ADMIN — POTASSIUM CHLORIDE 10 MEQ: 7.46 INJECTION, SOLUTION INTRAVENOUS at 23:31

## 2022-01-01 RX ADMIN — POTASSIUM CHLORIDE 10 MEQ: 7.46 INJECTION, SOLUTION INTRAVENOUS at 00:30

## 2022-01-01 RX ADMIN — VENLAFAXINE HYDROCHLORIDE 37.5 MG: 37.5 CAPSULE, EXTENDED RELEASE ORAL at 21:02

## 2022-01-01 RX ADMIN — TRAZODONE HYDROCHLORIDE 150 MG: 50 TABLET ORAL at 21:02

## 2022-01-01 RX ADMIN — TRAZODONE HYDROCHLORIDE 150 MG: 100 TABLET ORAL at 20:27

## 2022-01-01 RX ADMIN — EPINEPHRINE 1 MG: 0.1 INJECTION, SOLUTION ENDOTRACHEAL; INTRACARDIAC; INTRAVENOUS at 03:41

## 2022-01-01 RX ADMIN — HALOPERIDOL 10 MG: 5 TABLET ORAL at 21:02

## 2022-01-01 RX ADMIN — PANTOPRAZOLE SODIUM 40 MG: 40 INJECTION, POWDER, FOR SOLUTION INTRAVENOUS at 06:15

## 2022-01-01 RX ADMIN — POTASSIUM CHLORIDE 40 MEQ: 1500 TABLET, EXTENDED RELEASE ORAL at 10:57

## 2022-01-01 RX ADMIN — EPINEPHRINE 1 MG: 0.1 INJECTION, SOLUTION ENDOTRACHEAL; INTRACARDIAC; INTRAVENOUS at 03:35

## 2022-01-01 RX ADMIN — SODIUM CHLORIDE: 9 INJECTION, SOLUTION INTRAVENOUS at 21:02

## 2022-01-01 RX ADMIN — VANCOMYCIN HYDROCHLORIDE 1000 MG: 1 INJECTION, POWDER, LYOPHILIZED, FOR SOLUTION INTRAVENOUS at 06:22

## 2022-01-01 RX ADMIN — HALOPERIDOL 10 MG: 5 TABLET ORAL at 22:11

## 2022-01-01 RX ADMIN — MAGNESIUM SULFATE HEPTAHYDRATE 2000 MG: 2 INJECTION, SOLUTION INTRAVENOUS at 11:01

## 2022-01-01 RX ADMIN — SODIUM CHLORIDE 1000 ML: 9 INJECTION, SOLUTION INTRAVENOUS at 03:52

## 2022-01-01 RX ADMIN — VENLAFAXINE HYDROCHLORIDE 37.5 MG: 37.5 CAPSULE, EXTENDED RELEASE ORAL at 12:58

## 2022-01-01 RX ADMIN — SODIUM CHLORIDE: 9 INJECTION, SOLUTION INTRAVENOUS at 02:29

## 2022-01-01 RX ADMIN — SODIUM CHLORIDE, PRESERVATIVE FREE 10 ML: 5 INJECTION INTRAVENOUS at 08:40

## 2022-01-01 RX ADMIN — MAGNESIUM SULFATE HEPTAHYDRATE 2000 MG: 40 INJECTION, SOLUTION INTRAVENOUS at 05:56

## 2022-01-01 RX ADMIN — FENTANYL CITRATE 100 MCG: 50 INJECTION, SOLUTION INTRAMUSCULAR; INTRAVENOUS at 07:18

## 2022-01-01 RX ADMIN — POTASSIUM PHOSPHATE, MONOBASIC AND POTASSIUM PHOSPHATE, DIBASIC 10 MMOL: 224; 236 INJECTION, SOLUTION, CONCENTRATE INTRAVENOUS at 11:12

## 2022-01-01 RX ADMIN — HALOPERIDOL LACTATE 2.5 MG: 5 INJECTION, SOLUTION INTRAMUSCULAR at 19:23

## 2022-01-01 RX ADMIN — HALOPERIDOL 10 MG: 10 TABLET ORAL at 20:27

## 2022-01-01 RX ADMIN — SODIUM CHLORIDE, POTASSIUM CHLORIDE, SODIUM LACTATE AND CALCIUM CHLORIDE: 600; 310; 30; 20 INJECTION, SOLUTION INTRAVENOUS at 02:26

## 2022-01-01 RX ADMIN — NOREPINEPHRINE BITARTRATE 5 MCG/MIN: 1 INJECTION INTRAVENOUS at 03:58

## 2022-01-01 RX ADMIN — SODIUM CHLORIDE, POTASSIUM CHLORIDE, SODIUM LACTATE AND CALCIUM CHLORIDE: 600; 310; 30; 20 INJECTION, SOLUTION INTRAVENOUS at 12:07

## 2022-01-01 RX ADMIN — SODIUM PHOSPHATE, MONOBASIC, MONOHYDRATE AND SODIUM PHOSPHATE, DIBASIC, ANHYDROUS 20 MMOL: 276; 142 INJECTION, SOLUTION INTRAVENOUS at 06:32

## 2022-01-01 RX ADMIN — Medication 10 MEQ: at 15:43

## 2022-01-01 RX ADMIN — SODIUM CHLORIDE: 9 INJECTION, SOLUTION INTRAVENOUS at 02:05

## 2022-01-01 RX ADMIN — DIBASIC SODIUM PHOSPHATE, MONOBASIC POTASSIUM PHOSPHATE AND MONOBASIC SODIUM PHOSPHATE 2 TABLET: 852; 155; 130 TABLET ORAL at 12:01

## 2022-01-01 RX ADMIN — SODIUM CHLORIDE, PRESERVATIVE FREE 10 ML: 5 INJECTION INTRAVENOUS at 22:19

## 2022-01-01 RX ADMIN — Medication 10 MEQ: at 14:57

## 2022-01-01 RX ADMIN — DIPHENHYDRAMINE HYDROCHLORIDE 25 MG: 50 INJECTION, SOLUTION INTRAMUSCULAR; INTRAVENOUS at 19:23

## 2022-01-01 RX ADMIN — TRAZODONE HYDROCHLORIDE 150 MG: 50 TABLET ORAL at 22:09

## 2022-01-01 RX ADMIN — SODIUM CHLORIDE 1000 ML: 9 INJECTION, SOLUTION INTRAVENOUS at 05:40

## 2022-01-01 RX ADMIN — FENTANYL CITRATE 100 MCG: 50 INJECTION, SOLUTION INTRAMUSCULAR; INTRAVENOUS at 08:15

## 2022-01-01 RX ADMIN — SODIUM CHLORIDE 1452 ML: 9 INJECTION, SOLUTION INTRAVENOUS at 04:57

## 2022-01-01 RX ADMIN — SODIUM BICARBONATE 50 MEQ: 84 INJECTION, SOLUTION INTRAVENOUS at 03:40

## 2022-01-01 RX ADMIN — EPINEPHRINE 1 MG: 0.1 INJECTION, SOLUTION ENDOTRACHEAL; INTRACARDIAC; INTRAVENOUS at 03:38

## 2022-01-01 RX ADMIN — HALOPERIDOL 10 MG: 10 TABLET ORAL at 20:56

## 2022-01-01 ASSESSMENT — PAIN - FUNCTIONAL ASSESSMENT
PAIN_FUNCTIONAL_ASSESSMENT: ADULT NONVERBAL PAIN SCALE (NPVS)
PAIN_FUNCTIONAL_ASSESSMENT: ADULT NONVERBAL PAIN SCALE (NPVS)
PAIN_FUNCTIONAL_ASSESSMENT: NONE - DENIES PAIN

## 2022-01-01 ASSESSMENT — PULMONARY FUNCTION TESTS: PIF_VALUE: 19

## 2022-01-01 ASSESSMENT — SLEEP AND FATIGUE QUESTIONNAIRES
DO YOU USE A SLEEP AID: COMMENT
DO YOU HAVE DIFFICULTY SLEEPING: COMMENT
AVERAGE NUMBER OF SLEEP HOURS: 0

## 2022-01-01 ASSESSMENT — ENCOUNTER SYMPTOMS
VOMITING: 0
DIARRHEA: 0
NAUSEA: 0
ABDOMINAL PAIN: 1
CONSTIPATION: 0

## 2022-01-01 ASSESSMENT — LIFESTYLE VARIABLES
HOW OFTEN DO YOU HAVE A DRINK CONTAINING ALCOHOL: PATIENT DECLINED
HOW MANY STANDARD DRINKS CONTAINING ALCOHOL DO YOU HAVE ON A TYPICAL DAY: PATIENT DECLINED

## 2022-09-15 NOTE — ED PROVIDER NOTES
[facility]  EMERGENCY DEPARTMENT ENCOUNTER        Pt Name: Mamadou Dolan  MRN: [id number]  Danielgfrose 1965  Date of evaluation: 9/15/2022  Provider: MOIZ Payton CNP  PCP: MOIZ Mcintyre CNP  ED Attending: No att. providers found    [address] [place] [address]       Chief Complaint   Patient presents with    Altered Mental Status    Suicidal     Per police, pt voiced suicidal ideations to his ex wife. Per family they have not heard from him in 3 weeks. Pts clothing soiled and covered in urine. Pt not complying with staff. HISTORY OF PRESENT ILLNESS   (Location/Symptom, Timing/Onset, Context/Setting, Quality, Duration, Modifying Factors, Severity)  Note limiting factors. Mamadou Dolan is a 59 y.o. male for  not caring for self. Onset was today. Context includes police completed a welfare check and police report they found him frail and not able to care for self. He was brought to the er to be seen. Please report he was too weak to stand and they had to carry him to the police car. Alleviating factors include nothing. Aggravating factors include nothing. Pain is unable to determine/10. Unsure has been used for pain today. Nursing Notes were all reviewed and agreed with or any disagreements were addressed  in the HPI. REVIEW OF SYSTEMS  (2-9 systems for level 4, 10 or more for level 5)     Review of Systems   Unable to perform ROS: Mental status change (Patient initially answered 1 or 2 questions however has stopped answering questions and cooperating)     Positivesand Pertinent negatives as per HPI. Except as noted above in the ROS, all other systems were reviewed and negative.        PAST MEDICAL HISTORY     Past Medical History:   Diagnosis Date    Anxiety     Arthritis     Clostridium difficile diarrhea 01/10/2015    Depression     Difficulty urinating     DM (diabetes mellitus) ([facility] [address]     Hematoma     pt unsure where it was    Hx of blood clots     pt unsure where Hyperlipidemia     Hypertension     Pneumonia     Psychosis ([facility] [address]     Schizoaffective disorder, bipolar type [facility])          SURGICAL HISTORY       Past Surgical History:   Procedure Laterality Date    COLONOSCOPY      COLONOSCOPY  11/30/2016    colon polyp    TONSILLECTOMY           CURRENT MEDICATIONS       Current Discharge Medication List        CONTINUE these medications which have NOT CHANGED    Details   haloperidol (HALDOL) 10 MG tablet Take 10 mg by mouth at bedtime      venlafaxine (EFFEXOR XR) 37.5 MG extended release capsule Take 37.5 mg by mouth at bedtime      traZODone (DESYREL) 150 MG tablet Take 150 mg by mouth nightly               ALLERGIES     Patient has no known allergies. FAMILY HISTORY       Family History   Problem Relation Age of Onset    Heart Disease Mother     Mental Illness Mother     Cancer Father     Heart Disease Brother     Mental Illness Brother          SOCIAL HISTORY       Social History     Socioeconomic History    Marital status:      Spouse name: None    Number of children: 1    Years of education: 11    Highest education level: None   Tobacco Use    Smoking status: Some Days     Packs/day: 0.50     Years: 40.00     Pack years: 20.00     Types: Cigarettes    Smokeless tobacco: Never    Tobacco comments:     refused counseling; smokes less than a pack/day   Substance and Sexual Activity    Alcohol use: Not Currently    Drug use: Yes     Types: Marijuana (Weed)    Sexual activity: Not Currently       SCREENINGS    [place] Coma Scale  Eye Opening: Spontaneous  Best Verbal Response: Oriented  Best Motor Response: Obeys commands  Rui Coma Scale Score: 15        PHYSICAL EXAM    (up to 7 for level 4, 8 ormore for level 5)     ED Triage Vitals   BP Temp Temp src Pulse Resp SpO2 Height Weight   -- -- -- -- -- -- -- --       Physical Exam  Constitutional:       Appearance: He is cachectic. Comments: Malodorous male   HENT:      Head: Normocephalic and atraumatic. Eyes:      Pupils: Pupils are equal, round, and reactive to light. Cardiovascular:      Rate and Rhythm: Normal rate. Pulmonary:      Effort: Pulmonary effort is normal. No respiratory distress. Abdominal:      General: There is no distension. Palpations: Abdomen is soft. Musculoskeletal:         General: Normal range of motion. Cervical back: Normal range of motion. Skin:     General: Skin is warm and dry. Neurological:      Mental Status: He is alert.       Comments: Patient not answering questions however when asked if he wants to drink he said yes Pepsi       DIAGNOSTIC RESULTS   LABS:    Labs Reviewed   CBC WITH AUTO DIFFERENTIAL - Abnormal; Notable for the following components:       Result Value    Neutrophils Absolute 8.2 (*)     Lymphocytes Absolute 0.4 (*)     All other components within normal limits   COMPREHENSIVE METABOLIC PANEL W/ REFLEX TO MG FOR LOW K - Abnormal; Notable for the following components:    Sodium 127 (*)     Chloride 74 (*)     Anion Gap 24 (*)     Glucose 153 (*)      (*)     Creatinine 3.6 (*)     GFR Non- 17 (*)     GFR  21 (*)     Total Protein 8.3 (*)     All other components within normal limits    Narrative:     Gary Morgan tel. [phone],  Chemistry results called to and read back by Gerardo Cisneros, 09/15/2022  21:36, by GRILL   URINALYSIS WITH REFLEX TO CULTURE - Abnormal; Notable for the following components:    Blood, Urine MODERATE (*)     All other components within normal limits   URINE DRUG SCREEN - Abnormal; Notable for the following components:    Cannabinoid Scrn, Ur POSITIVE (*)     All other components within normal limits   TROPONIN - Abnormal; Notable for the following components:    Troponin 0.04 (*)     All other components within normal limits   BRAIN NATRIURETIC PEPTIDE - Abnormal; Notable for the following components:    Pro-BNP 9,753 (*)     All other components within normal limits PROCALCITONIN - Abnormal; Notable for the following components:    Procalcitonin 0.37 (*)     All other components within normal limits    Narrative:     Ciara Gallego tel. [phone],  Chemistry results called to and read back by Elfego Cisneros, 09/15/2022  21:36, by LESLIE   ACETAMINOPHEN LEVEL - Abnormal; Notable for the following components:    Acetaminophen Level <5 (*)     All other components within normal limits   SALICYLATE LEVEL - Abnormal; Notable for the following components:    Salicylate, Serum <7.7 (*)     All other components within normal limits   MAGNESIUM - Abnormal; Notable for the following components:    Magnesium 3.20 (*)     All other components within normal limits   TROPONIN - Abnormal; Notable for the following components:    Troponin 0.03 (*)     All other components within normal limits   CBC WITH AUTO DIFFERENTIAL - Abnormal; Notable for the following components:    Hematocrit 40.2 (*)     Neutrophils Absolute 9.1 (*)     Lymphocytes Absolute 0.5 (*)     All other components within normal limits   COMPREHENSIVE METABOLIC PANEL W/ REFLEX TO MG FOR LOW K - Abnormal; Notable for the following components:    Sodium 128 (*)     Potassium reflex Magnesium 2.7 (*)     Chloride 81 (*)     Anion Gap 17 (*)     Glucose 192 (*)      (*)     Creatinine 2.9 (*)     GFR Non- 22 (*)     GFR  27 (*)     All other components within normal limits    Narrative:     CALL  [address] tel. [phone],  Chemistry results called to and read back by Alicia Wilhelm, 09/16/2022  05:28, by LESLIE  Chemistry results called to and read back by Alicia Wilhelm, 09/16/2022  05:28, by LESLIE   MAGNESIUM - Abnormal; Notable for the following components:    Magnesium 2.70 (*)     All other components within normal limits    Narrative:     Brandy Lucero tel. [phone],  Chemistry results called to and read back by Alicia Wilhelm, 09/16/2022  05:28, by [facility] results called to and read back by Karli Michele, 09/16/2022  05:28, by LESLIE   POTASSIUM - Abnormal; Notable for the following components:    Potassium 3.0 (*)     All other components within normal limits   RENAL FUNCTION PANEL - Abnormal; Notable for the following components:    Sodium 132 (*)     Potassium 3.2 (*)     Chloride 92 (*)      (*)     Creatinine 2.4 (*)     GFR Non- 28 (*)     GFR  34 (*)     Calcium 7.5 (*)     Phosphorus 1.7 (*)     Albumin 3.3 (*)     All other components within normal limits    Narrative:     CALL  [facility]  [employer] tel. [phone],  Previous panic on this admission - call not needed per SOP, 09/16/2022 23:02,  by LESLIE   COVID-19 & INFLUENZA COMBO   ETHANOL   CK   RENAL FUNCTION PANEL   POCT GLUCOSE       All other labs were within normal range or not returned as of this dictation. EKG: All EKG's are interpreted by the Emergency Department Physician who either signs or Co-signs this chart in the absence of a cardiologist.  Please see their note for interpretation of EKG. RADIOLOGY:   Portable chest x-ray interpreted by radiologist for  Impression:    No acute cardiopulmonary process            Head CT interpreted by radiologist for  Impression:    No acute intracranial abnormality. Interpretation per the Radiologist below, if available at the time of this note:    CT HEAD WO CONTRAST   Final Result   No acute intracranial abnormality. XR CHEST PORTABLE   Final Result   No acute cardiopulmonary process           No results found.       PROCEDURES   Unless otherwise noted below, none     Procedures    CRITICAL CARE TIME   N/A    CONSULTS:  IP CONSULT TO HOSPITALIST  IP CONSULT TO PHARMACY  IP CONSULT TO NEPHROLOGY  IP CONSULT TO PSYCHIATRY      EMERGENCY DEPARTMENT COURSE and DIFFERENTIAL DIAGNOSIS/MDM:   Vitals:    Vitals:    09/16/22 2000 09/16/22 2100 09/16/22 2207 09/17/22 0018   BP: 101/79 106/76 (!) 95/51 (!) 97/61 Pulse: 90 74 83 82   Resp: 16 16 15 14   Temp: 98.4 °F (36.9 °C)      TempSrc: Oral      SpO2:  100% 100% 99%   Weight:       Height:           Patient was given the following medications:  Medications   0.9 % sodium chloride infusion ( IntraVENous New Bag 9/16/22 1803)   sodium chloride flush 0.9 % injection 5-40 mL (10 mLs IntraVENous Given 9/16/22 2219)   sodium chloride flush 0.9 % injection 5-40 mL (has no administration in time range)   0.9 % sodium chloride infusion (has no administration in time range)   heparin (porcine) injection 5,000 Units (5,000 Units SubCUTAneous Not Given 9/16/22 2211)   polyethylene glycol (GLYCOLAX) packet 17 g (has no administration in time range)   acetaminophen (TYLENOL) tablet 650 mg (has no administration in time range)     Or   acetaminophen (TYLENOL) suppository 650 mg (has no administration in time range)   nicotine (NICODERM CQ) 14 MG/24HR 1 patch (1 patch TransDERmal Not Given 9/16/22 0815)   prochlorperazine (COMPAZINE) injection 10 mg (has no administration in time range)   mupirocin (BACTROBAN) 2 % ointment ( Nasal Not Given 9/16/22 2220)   haloperidol (HALDOL) tablet 10 mg (10 mg Oral Given 9/16/22 2211)   traZODone (DESYREL) tablet 150 mg (150 mg Oral Given 9/16/22 2209)   venlafaxine (EFFEXOR XR) extended release capsule 37.5 mg (37.5 mg Oral Given 9/16/22 2209)   potassium chloride 10 mEq/100 mL IVPB (Peripheral Line) (10 mEq IntraVENous New Bag 9/17/22 0030)   diphenhydrAMINE (BENADRYL) injection 25 mg (25 mg IntraMUSCular Given 9/15/22 1923)   haloperidol lactate (HALDOL) injection 2.5 mg (2.5 mg IntraMUSCular Given 9/15/22 1923)   0.9 % sodium chloride bolus (0 mLs IntraVENous Stopped 9/15/22 2200)   potassium bicarbonate (K-LYTE) disintegrating tablet 50 mEq (50 mEq Oral Given 9/16/22 0610)   potassium chloride 10 mEq/100 mL IVPB (Peripheral Line) (10 mEq IntraVENous New Bag 9/16/22 1901)         Patient was seen evaluated by myself and .   Patient here after he was brought in by the police on a psychiatric hold. Police report that they did a welfare check on the patient and found him malnourished and they were concerned that he was unable to care for himself because he was very weak and cachectic. They also report that he was not malodorous. Patient was subsequently brought to the ED on a psychiatric hold. On exam the patient initially answered a question but then would not answer any additional questions. Patient was fighting the nurses over obtaining vitals and was not answering any questions. Ultimately required restraints and medications. Lab values have been reviewed and interpreted head CT and chest x-ray were reviewed. Dr Nicholas Pacheco follow-up on pending images and final disposition of the patient. The patient tolerated their visit well. They were seen and evaluated by the attending physician, No att. providers found who agreed with the assessment and plan. The patient and / or the family were informed of the results of any tests, a time was given to answer questions, a plan was proposed and they agreed with plan. Is this patient to be included in the SEP-1 Core Measure due to severe sepsis or septic shock? No   Exclusion criteria - the patient is NOT to be included for SEP-1 Core Measure due to: Infection is not suspected             FINAL IMPRESSION      1. Acute kidney injury ([facility] [address]    2. Uremia    3. Suicidal ideation    4. Elevated troponin    5.  Failure to thrive in adult          DISPOSITION/PLAN   DISPOSITION Admitted 09/16/2022 12:41:19 AM      PATIENT REFERRED TO:  Ceci Corado [address] [facility] [phone]  Call  For new patient appointment    DISCHARGE MEDICATIONS:  Current Discharge Medication List          DISCONTINUED MEDICATIONS:  Current Discharge Medication List        STOP taking these medications       rOPINIRole (REQUIP) 0.25 MG tablet Comments:   Reason for Stopping: clonazePAM (KLONOPIN) 0.5 MG tablet Comments:   Reason for Stopping:         lactobacillus (CULTURELLE) capsule Comments:   Reason for Stopping:         fenofibrate (TRIGLIDE) 160 MG tablet Comments:   Reason for Stopping:         fluPHENAZine HCl (PROLIXIN) 10 MG tablet Comments:   Reason for Stopping:         OLANZapine (ZYPREXA) 10 MG tablet Comments:   Reason for Stopping:         propranolol (INDERAL) 20 MG tablet Comments:   Reason for Stopping:         tamsulosin (FLOMAX) 0.4 MG capsule Comments:   Reason for Stopping:         pantoprazole (PROTONIX) 40 MG tablet Comments:   Reason for Stopping:         sodium zirconium cyclosilicate (LOKELMA) 10 g PACK oral suspension Comments:   Reason for Stopping:                      (Please note that portions of this note were completed with a voice recognition program.  Efforts were made to edit the dictations but occasionally words are mis-transcribed.)    MOIZ Rosenbaum CNP (electronically signed)      MOIZ Rosenbaum CNP  09/17/22 8438

## 2022-09-15 NOTE — ED PROVIDER NOTES
I independently performed a history and physical on Kamille Arevalo. All diagnostic, treatment, and disposition decisions were made by myself in conjunction with the advanced practice provider/resident.      Labs Reviewed   CBC WITH AUTO DIFFERENTIAL - Abnormal; Notable for the following components:       Result Value    Neutrophils Absolute 8.2 (*)     Lymphocytes Absolute 0.4 (*)     All other components within normal limits   COMPREHENSIVE METABOLIC PANEL W/ REFLEX TO MG FOR LOW K - Abnormal; Notable for the following components:    Sodium 127 (*)     Chloride 74 (*)     Anion Gap 24 (*)     Glucose 153 (*)      (*)     Creatinine 3.6 (*)     GFR Non- 17 (*)     GFR  21 (*)     Total Protein 8.3 (*)     All other components within normal limits    Narrative:     [employer] tel. [phone],  Chemistry results called to and read back by Scott Cisneros, 09/15/2022  21:36, by LESLIE   URINALYSIS WITH REFLEX TO CULTURE - Abnormal; Notable for the following components:    Blood, Urine MODERATE (*)     All other components within normal limits   URINE DRUG SCREEN - Abnormal; Notable for the following components:    Cannabinoid Scrn, Ur POSITIVE (*)     All other components within normal limits   TROPONIN - Abnormal; Notable for the following components:    Troponin 0.04 (*)     All other components within normal limits   BRAIN NATRIURETIC PEPTIDE - Abnormal; Notable for the following components:    Pro-BNP 9,753 (*)     All other components within normal limits   PROCALCITONIN - Abnormal; Notable for the following components:    Procalcitonin 0.37 (*)     All other components within normal limits    Narrative:     [employer] tel. [phone],  Chemistry results called to and read back by Scott Cisneros, 09/15/2022  21:36, by LESLIE   ACETAMINOPHEN LEVEL - Abnormal; Notable for the following components:    Acetaminophen Level <5 (*)     All other components within normal limits   SALICYLATE LEVEL - Abnormal; Notable for the following components:    Salicylate, Serum <6.2 (*)     All other components within normal limits   MAGNESIUM - Abnormal; Notable for the following components:    Magnesium 3.20 (*)     All other components within normal limits   COVID-19 & INFLUENZA COMBO   ETHANOL   CK   TROPONIN     CT HEAD WO CONTRAST   Final Result   No acute intracranial abnormality. XR CHEST PORTABLE   Final Result   No acute cardiopulmonary process           For further details of [address] [facility] emergency department encounter, please see the LOGAN/resident's documentation. I personally saw the patient and performed a substantive portion of the visit including all aspects of the medical decision making. Briefly, this is a 25-year-old male who was brought in by police with concerns for suicidal ideation, family has not seen him for the past 3 weeks. Patient was placed on a hold by police due to his suicidal thoughts. He was uncooperative and will not contribute to the history here in the department, does have a history of schizophrenia. Labs are performed, show multiple electrolyte abnormalities with sodium of 127, BUN of 192, creatinine of 3.6. He was treated with IV fluids here in the department. His magnesium was also high at 3.2. proBNP is elevated at 9753 however he does not appear fluid overloaded to feel that he is dehydrated. Troponin was found to be 0.04 which I believe is likely secondary to his renal function however repeat will be ordered prior to admission. Patient will be hospitalized for further work-up and treatment of his condition and psychiatric consultation. I personally saw the patient and independently provided 25 minutes of non-concurrent critical care out of the total shared critical care time provided. I, Dr. Artem Jeronimo, am the primary clinician of record. 1. Acute kidney injury ([facility] [address]    2. Uremia    3. Suicidal ideation    4. Elevated troponin    5. Failure to thrive in adult      Comment: Please note this report has been produced using speech recognition software and may contain errors related to that system including errors in grammar, punctuation, and spelling, as well as words and phrases that may be inappropriate. If there are any questions or concerns please feel free to contact the dictating provider for clarification.        Page Goldman MD  09/15/22 3815

## 2022-09-15 NOTE — ED NOTES
Pt not complying with staff, yelling profanities, attempting to pull at staffs clothing and spit at nurse.      Irina Elliott RN  09/15/22 1928

## 2022-09-16 PROBLEM — E43 SEVERE PROTEIN-CALORIE MALNUTRITION (HCC): Chronic | Status: ACTIVE | Noted: 2019-08-03

## 2022-09-16 PROBLEM — E43 SEVERE PROTEIN-CALORIE MALNUTRITION (HCC): Status: ACTIVE | Noted: 2019-08-03

## 2022-09-16 NOTE — PROGRESS NOTES
Comprehensive Nutrition Assessment    Type and Reason for Visit:  Initial, Positive Nutrition Screen (+ screen for MST = 2)    Nutrition Recommendations/Plan:   Continue ADULT DIET; Regular; 4 carb choices per meal; Low-K; Low-Phos; 60-80 g protein diet order + safety tray restriction. Added Glucerna with meals. Monitor appetite, meal intake, and acceptance/intake of ONS. Monitor nutrition-related labs, bowel function, mental status, and weight trends. Malnutrition Assessment:  Malnutrition Status:  Severe malnutrition (09/16/22 1411)    Context:  Chronic Illness     Findings of the 6 clinical characteristics of malnutrition:  Energy Intake:  75% or less estimated energy requirements for 1 month or longer  Weight Loss:  Unable to assess     Body Fat Loss:  Severe body fat loss Orbital, Buccal region   Muscle Mass Loss:  Severe muscle mass loss Temples (temporalis), Clavicles (pectoralis & deltoids)  Fluid Accumulation:  No significant fluid accumulation     Strength:  Not Performed    Nutrition Assessment:    patient is severely malnourished AEB not caring for himself PTA, living in a [facility], and mental health compromise PTA and he is at risk for further compromise d/t muscle wasting + fat loss, he is consuming < 50% of his meals, and altered nutrition-related labs; will continue ADULT DIET;  Regular; 4 carb choices per meal; Low-K; Low-Phos; 60-80 g protein per day diet order + safety tray restriction and will add Glucerna with meals    Nutrition Related Findings:    patient is A & O x 4; he was living in a [facility] for unknown amount of time; his family had apparently not seen/heard from patient is a few weeks so a welfare check was called on patient and patient was found in his motel bed + lying in his own urine/feces and he was very weak; patient's last BM was on 9/14/22; he has consumed 1-25% x 2 meals today; NS infusing at 125 ml/hr currently; patient c/o abdominal pain and he reported to  Ksenia that he \"hasn't eaten well in a while\"; patient did not want to interact with this RD when this RD attempted initial assessment this am Wound Type: None       Current Nutrition Intake & Therapies:    Average Meal Intake: 1-25%  Average Supplements Intake: None Ordered  ADULT DIET; Regular; 4 carb choices (60 gm/meal); Low Potassium (Less than 3000 mg/day); Low Phosphorus (Less than 1000 mg); 60 to 80 gm; Safety Tray; Safety Tray (Disposables)  ADULT ORAL NUTRITION SUPPLEMENT; Breakfast, Lunch, Dinner; Diabetic Oral Supplement    Anthropometric Measures:  Height: 5' 6\" (167.6 cm)  Ideal Body Weight (IBW): 142 lbs (65 kg)    Admission Body Weight: 107 lb 12.9 oz (48.9 kg) (obtained on 9/16/22; actual weight)  Current Body Weight: 107 lb 12.9 oz (48.9 kg) (obtained on 9/16/22; actual weight), 75.9 % IBW.  Weight Source: Bed Scale  Current BMI (kg/m2): 17.4  Usual Body Weight:  (unable to assess d/t lack of weight hx)     Weight Adjustment For: No Adjustment                 BMI Categories: Underweight (BMI less than 18.5)    Estimated Daily Nutrient Needs:  Energy Requirements Based On: Kcal/kg  Weight Used for Energy Requirements: Current  Energy (kcal/day): 1617 - 6752 kcals based on 33-35 kcals/kg/CBW  Weight Used for Protein Requirements: Current  Protein (g/day): 74 - 83 g protein based on 1.5-1.7 g/kg/CBW  Method Used for Fluid Requirements: 1 ml/kcal  Fluid (ml/day): 1617 - 1715 ml    Nutrition Diagnosis:   Severe malnutrition related to inadequate protein-energy intake, psychological cause or life stress, lack or limited access to food as evidenced by poor intake prior to admission, intake 0-25%, severe loss of subcutaneous fat, severe muscle loss, lab values, GI abnormality, BMI    Nutrition Interventions:   Food and/or Nutrient Delivery: Continue Current Diet, Start Oral Nutrition Supplement  Nutrition Education/Counseling: No recommendation at this time  Coordination of Nutrition Care: Continue to monitor while inpatient, Interdisciplinary Rounds, [address] discussed with: patient's RN    Goals:     Goals: Meet at least 75% of estimated needs, by next RD assessment       Nutrition Monitoring and Evaluation:   Behavioral-Environmental Outcomes: Beliefs and Attitutes  Food/Nutrient Intake Outcomes: Food and Nutrient Intake, Supplement Intake, IVF Intake  Physical Signs/Symptoms Outcomes: Biochemical Data, GI Status, Meal Time Behavior, Nutrition Focused Physical Findings, Skin, Weight    Discharge Planning:     Too soon to determine     Anisa Francisco, [address] [place], [place]  Contact: [phone]

## 2022-09-16 NOTE — CONSULTS
[facility] Consult Note           Reason for Consult:  Acute kidney injury  Requesting Physician:  Dr. Jose Wolf    Chief Complaint:  Altered mental status  History Obtained From:  patient, electronic medical record    History of Present Ilness:    59year old male with HTN, DM, Schizophrenia admitted with altered mental status. We have been asked to assist in further mgmt of his RANDI. SCr 3.6 on admission, better at 2.9 this AM, 1.6 from Oct 2021. UA moderate blood, no protein, s.g. 1.015. SNa 127-128 since admission. K as low as 3.0 requiring replacement. Bp's have been low, better with IVF's. Mentation better, +weak, intake reduced, no fevers. Past Medical History:        Diagnosis Date    Anxiety     Arthritis     Clostridium difficile diarrhea 01/10/2015    Depression     Difficulty urinating     DM (diabetes mellitus) ([facility] [address]     Hematoma     pt unsure where it was    Hx of blood clots     pt unsure where    Hyperlipidemia     Hypertension     Pneumonia     Psychosis ([facility]     Schizoaffective disorder, bipolar type [facility])        Past Surgical History:        Procedure Laterality Date    COLONOSCOPY      COLONOSCOPY  11/30/2016    colon polyp    TONSILLECTOMY         Home Medications:    No current facility-administered medications on file prior to encounter. Current Outpatient Medications on File Prior to Encounter   Medication Sig Dispense Refill    haloperidol (HALDOL) 10 MG tablet Take 10 mg by mouth at bedtime      venlafaxine (EFFEXOR XR) 37.5 MG extended release capsule Take 37.5 mg by mouth at bedtime      traZODone (DESYREL) 150 MG tablet Take 150 mg by mouth nightly         Allergies:  Patient has no known allergies.     Social History:    Social History     Socioeconomic History    Marital status:      Spouse name: Not on file    Number of children: 1    Years of education: 11    Highest education level: Not on file   Occupational History    Not on file   Tobacco Use    Smoking status: Some Days     Packs/day: 0.50     Years: 40.00     Pack years: 20.00     Types: Cigarettes    Smokeless tobacco: Never    Tobacco comments:     refused counseling; smokes less than a pack/day   Vaping Use    Vaping Use: Not on file   Substance and Sexual Activity    Alcohol use: Not Currently    Drug use: Yes     Types: Marijuana Shellye Burkitt)    Sexual activity: Not Currently   Other Topics Concern    Not on file   Social History Narrative    Not on file     Social Determinants of Health     Financial Resource Strain: Not on file   Food Insecurity: Not on file   Transportation Needs: Not on file   Physical Activity: Not on file   Stress: Not on file   Social Connections: Not on file   Intimate Partner Violence: Not on file   Housing Stability: Not on file       Family History:   Family History   Problem Relation Age of Onset    Heart Disease Mother     Mental Illness Mother     Cancer Father     Heart Disease Brother     Mental Illness Brother        Review of Systems:   Pertinent positives stated above in HPI. Remainder of 10 point review of systems were reviewed and were negative.     Physical exam:   Constitutional:  VITALS:  BP 99/70   Pulse 77   Temp 98.5 °F (36.9 °C) (Axillary)   Resp 14   Ht 5' 6\" (1.676 m)   Wt 107 lb 12.9 oz (48.9 kg)   SpO2 99%   BMI 17.40 kg/m²   Gen: alert, awake, nad  Skin: no rash, turgor wnl  Heent:  eomi, mmm  Neck: no bruits or jvd noted, thyroid normal  Cardiovascular:  S1, S2 without m/r/g  Respiratory: CTA B without w/r/r; respiratory effort normal  Abdomen:  +bs, soft, nt, nd, no hepatosplenomegaly  Ext: no lower extremity edema  Psychiatric: mood and affect limited; judgement and insight limited  Musculoskeletal:  Rom, muscular strength limited; digits, nails normal    Data/  CBC:   Lab Results   Component Value Date/Time    WBC 10.4 09/16/2022 04:11 AM    RBC 4.64 09/16/2022 04:11 AM    HGB 13.6 09/16/2022 04:11 AM    HCT 40.2 09/16/2022 04:11 AM    MCV 86.7 09/16/2022 04:11 AM    MCH 29.3 09/16/2022 04:11 AM    MCHC 33.8 09/16/2022 04:11 AM    RDW 13.2 09/16/2022 04:11 AM     09/16/2022 04:11 AM    MPV 9.3 09/16/2022 04:11 AM     BMP:    Lab Results   Component Value Date/Time     09/16/2022 04:11 AM    K 3.0 09/16/2022 08:13 AM    K 2.7 09/16/2022 04:11 AM    CL 81 09/16/2022 04:11 AM    CO2 30 09/16/2022 04:11 AM     09/16/2022 04:11 AM    LABALBU 4.0 09/16/2022 04:11 AM    CREATININE 2.9 09/16/2022 04:11 AM    CALCIUM 8.5 09/16/2022 04:11 AM    GFRAA 27 09/16/2022 04:11 AM    GFRAA >60 02/09/2013 12:15 PM    LABGLOM 22 09/16/2022 04:11 AM    GLUCOSE 192 09/16/2022 04:11 AM         Assessment/    - Acute kidney injury - pre-renal, fluid responsive, non-oliguric    - Chronic kidney disease stage 3 - baseline SCr 1.6    - Acute metabolic encephalopathy    - Hyponatremia    - Hypokalemia - prn replacement      Plan/    - Continue IVF's with  ml/hour  - Trend labs, bp's, & urine output      Thank you for the consultation. Please do not hesitate to call with questions. ____________________________________  Kvng Carlton MD  [facility]  [email or web address]  Office: [phone]

## 2022-09-16 NOTE — DISCHARGE INSTRUCTIONS
[facility] find a Physician line [phone]. Please call them to find a physician. They accept patients that have insurance.      Please have blood work done with your pcp

## 2022-09-16 NOTE — H&P
Hospital Medicine History & Physical      PCP: Filomena Faye, APRN - CNP    Date of Service: Pt seen/examined on 9/16/22 and admitted on 9/16/22 to Inpatient. Chief Complaint   Patient presents with    Altered Mental Status    Suicidal     Per police, pt voiced suicidal ideations to his ex wife. Per family they have not heard from him in 3 weeks. Pts clothing soiled and covered in urine. Pt not complying with staff. History Of Present Illness: The patient is a 59 y.o. male with PMH below, presented to ED by way of police. Apparently they were summoned for a wellness check. Pt reportedly recently made suicidal statements to family members. They also had not seen him in 3 weeks. He was reportedly found by police so weak that he could not stand and he had to be carried out to the car. He was reportedly covered in stool and urine. He has been combative w/ staff and has required chemical and physical restraints. He is cooperative and not willing to participate in history. He has Hx of schizophrenia per staff. He has required admission for psychosis. Past Medical History:        Diagnosis Date    Anxiety     Arthritis     Clostridium difficile diarrhea 1/10/15    Depression     Difficulty urinating     DM (diabetes mellitus) ([facility] [address]     Hematoma     pt unsure where it was    Hx of blood clots     pt unsure where    Hyperlipidemia     Hypertension     Pneumonia     Psychosis ([facility] [address]        Past Surgical History:        Procedure Laterality Date    COLONOSCOPY      COLONOSCOPY  11/30/2016    colon polyp    TONSILLECTOMY         Medications Prior to Admission:    Prior to Admission medications    Medication Sig Start Date End Date Taking? Authorizing Provider   rOPINIRole (REQUIP) 0.25 MG tablet Take 1 tablet by mouth nightly 3/31/20   Neha Bray MD   clonazePAM (KLONOPIN) 0.5 MG tablet Take 1 tablet by mouth 2 times daily for 15 days.  Put on medi set 3/11/20 3/26/20  Jayy Addison MD [place]   lactobacillus (CULTURELLE) capsule Take 1 capsule by mouth daily (with breakfast) Medi set 3/12/20   Thu Tyler MD   fenofibrate (TRIGLIDE) 160 MG tablet Take 1 tablet by mouth daily 3/12/20   Thu Tyler MD   fluPHENAZine HCl (PROLIXIN) 10 MG tablet Take 1 tablet by mouth daily Please put on medi set 3/12/20   Thu Tyler MD   OLANZapine (ZYPREXA) 10 MG tablet Take 1 tablet by mouth daily Medi set 3/12/20   Thu Tyler MD   propranolol (INDERAL) 20 MG tablet Take 1 tablet by mouth 2 times daily mediset 3/11/20   Thu Tyler MD   tamsulosin [facility]) 0.4 MG capsule Take 1 capsule by mouth daily Medi set 3/12/20   Thu Tyler MD   pantoprazole (PROTONIX) 40 MG tablet Take 1 tablet by mouth every morning (before breakfast) mediset 3/12/20   Thu Tyler MD   sodium zirconium cyclosilicate (LOKELMA) 10 g PACK oral suspension Take 10 g by mouth 3 times daily 3/11/20   Thu Tyler MD       Allergies:  Patient has no known allergies. Social History:    TOBACCO:   reports that he has been smoking cigarettes. He has a 20.00 pack-year smoking history. He has never used smokeless tobacco.  ETOH:   reports no history of alcohol use. Family History:  Reviewed in detail and negative for DM, Early CAD, Cancer (except as below). Positive as follows:        Problem Relation Age of Onset    Heart Disease Mother     Mental Illness Mother     Cancer Father     Heart Disease Brother     Mental Illness Brother        REVIEW OF SYSTEMS:  Unable to obtain 2/2 pt being uncooperative. PHYSICAL EXAM PERFORMED:  BP 93/70   Pulse (!) 112   Temp 98.2 °F (36.8 °C) (Axillary)   Resp 26   Wt 100 lb (45.4 kg)   SpO2 98%   BMI 16.14 kg/m²   GEN:  Somnolent, in restraints, NAD. Cachectic, unkempt. HEENT:  NC/AT,EOMI, dry MM, no erythema/exudates or visible masses. CVS:  Normal S1,S2. Tachy RRR. Without M/G/R.   LUNG:   CTA-B. No wheezes, rales or rhonchi. ABD:  Soft, ND/NT, BS+ x4. Without G/R.  EXT: 2+ pulses, no c/c/e. Brisk cap refill. PSY:  Unable to assess thought process as he refuses to interact of follow commands. Affect somnolent but appears sullen at times. YAJAIRA:  Does not follow commands. Grossly  ; CN III-XII grossly intact. Moves all 4 spontaneously. Sensory grossly intact. SKIN: No rash or lesions on visible skin. Chart review shows recent radiographs:  CT HEAD WO CONTRAST    Result Date: 9/15/2022  EXAMINATION: CT OF THE HEAD WITHOUT CONTRAST  9/15/2022 8:18 pm TECHNIQUE: CT of the head was performed without the administration of intravenous contrast. Automated exposure control, iterative reconstruction, and/or weight based adjustment of the mA/kV was utilized to reduce the radiation dose to as low as reasonably achievable. COMPARISON: 08/02/2019 HISTORY: ORDERING SYSTEM PROVIDED HISTORY: not caring for self TECHNOLOGIST PROVIDED HISTORY: If patient is on cardiac monitor and/or pulse ox, they may be taken off cardiac monitor and pulse ox, left on O2 if currently on. All monitors reattached when patient returns to room. Has a \"code stroke\" or \"stroke alert\" been called? ->No Reason for exam:->not caring for self Decision Support Exception - unselect if not a suspected or confirmed emergency medical condition->Emergency Medical Condition (MA) Reason for Exam: Suicidal (Per police, pt voiced suicidal ideations to his ex wife. Per family they have not heard from him in 3 weeks. Pts clothing soiled and covered in urine. Pt not complying with staff.) FINDINGS: BRAIN/VENTRICLES: There is no acute intracranial hemorrhage, mass effect or midline shift. No abnormal extra-axial fluid collection. The gray-white differentiation is maintained without evidence of an acute infarct. There is no evidence of hydrocephalus.  ORBITS: The visualized portion of the orbits demonstrate no acute abnormality. SINUSES: The visualized paranasal sinuses and mastoid air cells demonstrate no acute abnormality. SOFT TISSUES/SKULL:  No acute abnormality of the visualized skull or soft tissues. No acute intracranial abnormality. XR CHEST PORTABLE    Result Date: 9/15/2022  EXAMINATION: ONE XRAY VIEW OF THE CHEST 9/15/2022 8:17 pm COMPARISON: 03/06/2020 HISTORY: ORDERING SYSTEM PROVIDED HISTORY: Malnourishment TECHNOLOGIST PROVIDED HISTORY: Reason for malnourished Reason for Exam: Suicidal (Per police, pt voiced suicidal ideations to his ex wife. Per family they have not heard from him in 3 weeks. Pts clothing soiled and covered in urine. Pt not complying with staff.) FINDINGS: The cardiomediastinal silhouette is normal in size and contour. The lungs are clear. No pleural effusion or pneumothorax is present.      No acute cardiopulmonary process       EKG:    EKG 12 Lead [[id number]]    Collected: 09/15/22 2150    Updated: 09/15/22 2204     Ventricular Rate 103 BPM    Atrial Rate 103 BPM    P-R Interval 148 ms    QRS Duration 96 ms    Q-T Interval 388 ms    QTc Calculation (Bazett) 508 ms    P Axis 74 degrees    R Axis 69 degrees    T Axis 68 degrees    Diagnosis Sinus tachycardiaOtherwise normal ECGWhen compared with ECG of 19-OCT-2021 10:13,QT has lengthened     CBC:  Recent Labs     09/15/22  2042   WBC 9.1   HGB 16.2   HCT 47.3         RENAL  Recent Labs     09/15/22  2042   *   K 3.5   CL 74*   CO2 29   *   CREATININE 3.6*   GLUCOSE 153*     Hemoglobin a1c:  Lab Results   Component Value Date    LABA1C 5.6 02/29/2020    LABA1C 5.0 08/23/2019    LABA1C 5.3 08/02/2019     LFT'S:  Recent Labs     09/15/22  2042   AST 17   ALT 11   BILITOT 0.6   ALKPHOS 109     CARDIAC ENZYMES:   Recent Labs     09/15/22  2042 09/15/22  2359   TROPONINI 0.04* 0.03*     Lab Results   Component Value Date    PROBNP 9,753 (H) 09/15/2022     U/A:  Recent Labs     09/15/22  2059   LEUKOCYTESUR Negative COLORU Yellow   CLARITYU Clear   SPECGRAV 1.015   BLOODU MODERATE*   GLUCOSEU Negative     Urine Tox Screen:  Recent Labs     09/15/22  2059   LABAMPH Neg   BARBSCNU Neg   LABBENZ Neg   CANSU POSITIVE*   COCAIMETSCRU Neg   OPIATESCREENURINE Neg   PHENCYCLIDINESCREENURINE Neg   LABMETH Neg   PHUR 5.5  5.5   OXYCODONEUR Neg     PHYSICIAN CERTIFICATION  I certify that Mona Patino is expected to be hospitalized for 2 midnights based on the following assessment and plan:    ASSESSMENT/PLAN:  RANDI on CKD3 w/ severe azotemia. Cr 3.6, . Baseline Cr ~1.6 bu no recents since 10/2021. CK nml. IVF and Repeat in am.  Nephro c/s. FTT/generalized weakness. Pt unable to stand. Pt filthy on arrival, found covered in stool/urine. PT/OT eval, fall precautions. May need Psy and/or SNF placement. Hyponatremia, mild at 128. IVF and repeat in am.   Elevated troponin, 0.04, 0.03. Likely 2/2 renal fxn. Tele. Psychosis w/ combative behavior, pt current in 4 points. Required Benadryl and Haldol. Monitor. He has had similar presentations in past per family. Suicidal statements. Placed on hold by police, cont. Suicide precautions. Psy c/s. Med Rec unable to be confirmed. Does not appear to have been reviewed since 2019. Pt uncooperative. Requested pharmacy to contact pt's pharmacy to see if it can be confirmed that way. Regardless, per ED per family pt has not been taking his home Rx for weeks to months. No controlled substances on OARRS in last 60d. Hx schizophrenia, old med rec shows extensive regimen. Appreciate Psy assistance. Tobacco Abuse, counseled cessation, 14 mg nicotine patch. DVT Prophylaxis: Hep  Diet:   Code Status: Full Code   PT/OT Eval Status: Ordered. Dispo - Admit to inpatient PCU    Ranjeet Rausch MD    Thank you MOIZ De León - LC for the opportunity to be involved in this patient's care.  If you have any questions or concerns please feel free to contact me via the [employer] at [phone]. This chart was generated using the [address] [place] [address] dictation system. I created this record but it may contain dictation errors given the limitations of this technology.

## 2022-09-16 NOTE — ED NOTES
All belongings removed from pt and pt placed into safety gown. Sitter at bedside.      Nyasia Tello RN  09/16/22 0002

## 2022-09-16 NOTE — CARE COORDINATION
Met with pt at bedside with RN present as RN is sitter. Explained CM role and pt declined to speak with writer or answer any questions. Also declined CM calling family at this time. Margaret GRAHAM, MARIAHW-S    Addendum at 1:57pm:   Case Management Assessment  Initial Evaluation      Patient Name: Nancy Cardona  YOB: 1965  Diagnosis: Suicidal ideation [R45.851]  Uremia [N19]  Elevated troponin [R77.8]  Failure to thrive in adult [R62.7]  Acute kidney injury ([facility] [address] [N17.9]  Acute kidney injury superimposed on CKD ([facility] [address] [N17.9, N18.9]  Date / Time: 9/15/2022  6:46 PM    Admission status/Date:09/15/2022 Inpatient   Chart Reviewed: Yes      Patient Interviewed: Yes   Family Interviewed:  No      Hospitalization in the last 30 days:  No    Health Care Decision Maker :  pt declined     Met with:  pt   Interview conducted  (bedside/phone): bedside    Current PCP: Pt stated he doesn't currently have a PCP. Information placed on the AVS for the [facility] and [facility] - CELESTINA GUTIERREZ MD's. Pt declined referral     [address] required for SNF : Y          3 night stay required -  N    ADLS  Support Systems/Care Needs: None  Transportation:  B      Meal Preparation: self    Housing  Living Arrangements: pt lives at home alone  Steps: 0  Intent for return to present living arrangements: Yes  Identified Issues: [id number] [facility] with [address] : No Agency:(Services)     Passport/Waiver : No  :                      Phone Number:    Passport/Waiver Services: n/a          Durable Medical Equiptment   DME Provider: n/a  Equipment: n/a    Home O2 Use :  No      Community Service Affiliation  Dialysis:  No    Agency:  Location:  Dialysis Schedule:  Phone:   Fax: Other Community Services: [address] ([facility])-he stated he has  and the hospital can speak with them if needed.      DISCHARGE PLAN: Explained Case Management role/services. Chart review completed. Met with pt at bedside. Pt provided minimal answers to writer stating his stomach was hurting still and he was reluctant to discuss in details. Pt stated he is independent at home and plans on returning home. He denied current services besides [facility]. Oj Salcedo RN aware pt stated his stomach was hurting. Psych is following pt and will make a determination when medically clear; see note from Dr. Honey Layton on this date    CM will follow. Please notify CM if needs or concerns arise.      Araceli Goel MSW, MÓNICA-S

## 2022-09-16 NOTE — PROGRESS NOTES
Perfect-serve message sent to Dr. Cecilia Murphy regarding hypokalemia. Orders received for PO potassium repletion.

## 2022-09-16 NOTE — PROGRESS NOTES
4 Eyes Skin Assessment     The patient is being assess for   Admission    I agree that 2 RN's have performed a thorough Head to Toe Skin Assessment on the patient. ALL assessment sites listed below have been assessed. Areas assessed for pressure by both nurses:   [x]   Head, Face, and Ears   [x]   Shoulders, Back, and Chest, Abdomen  [x]   Arms, Elbows, and Hands   [x]   Coccyx, Sacrum, and Ischium  [x]   Legs, Feet, and Heels        Skin Assessed Under all Medical Devices by both nurses:  allred              All Mepilex Borders were peeled back and area peeked at by both nurses:  No: none  Please list where Mepilex Borders are located:  none             **SHARE this note so that the co-signing nurse is able to place an eSignature**    Co-signer eSignature: Electronically signed by Anson Wynne RN on 9/16/22 at 5:37 AM EDT    Does the Patient have Skin Breakdown related to pressure?   No     (Insert Photo here)         Danny Prevention initiated:  Yes   Wound Care Orders initiated:  No      [facility] nurse consulted for Pressure Injury (Stage 3,4, Unstageable, DTI, NWPT, Complex wounds)and New or Established Ostomies:  No      Primary Nurse eSignature: Electronically signed by Regina Hyman RN on 9/16/22 at 3:28 AM EDT

## 2022-09-16 NOTE — FLOWSHEET NOTE
09/16/22 0800   Vitals   Temp 98.5 °F (36.9 °C)   Temp Source Axillary   Heart Rate 88   Resp 20   BP 95/68   MAP (mmHg) 77   Level of Consciousness 0   MEWS Score 2   Cardiac Rhythm Sinus rhythm   Oxygen Therapy   SpO2 97 %   O2 Device None (Room air)   Vital signs stable. BP's remain intermittently soft. MAPs maintaining above 65. Pt is alert and oriented X4 and denies any SOB, pain or dizziness at the moment. Pt remains neurologically intact. Nothing new noted on head to toe assessment. Pt currently denies any suicidal ideation. This RN will remain at bedside as Sitter/RN until Psych evaluates Pt and deems whether suicide precautions are necessary. Pt is NSR on the monitor. Morning medication administration completed. Pt denies any further assistance at the moment. Will continue to monitor.

## 2022-09-16 NOTE — PROGRESS NOTES
Dr. Kirti Solano at bedside for psych eval. Per Dr. Kirti Solano, Pt is low risk for suicide. Suicide precautions can be discontinued at this time. Will update charge RN.

## 2022-09-16 NOTE — PROGRESS NOTES
Physical Therapy/Occupational Therapy    Attempted to see pt for PTOT eval. Pt declining therapy at this time 2/2 having stomach pains. RN notified. Will follow-up as PT/OT schedule and pt status permit. No Charge.     Nikita Taylor, PT, DPT, OMT-C # [id number]    Jayden Alcantara, OTR/L #[id number]

## 2022-09-16 NOTE — PROGRESS NOTES
Bedside report and Pt care transferred to [facility]. Pt denies any assistance at this time.

## 2022-09-16 NOTE — PLAN OF CARE
Problem: Discharge Planning  Goal: Discharge to home or other facility with appropriate resources  Outcome: Progressing  Flowsheets (Taken 9/16/2022 0241)  Discharge to home or other facility with appropriate resources: Identify barriers to discharge with patient and caregiver     Problem: Safety - Adult  Goal: Free from fall injury  Outcome: Progressing     Problem: ABCDS Injury Assessment  Goal: Absence of physical injury  Outcome: Progressing     Problem: Skin/Tissue Integrity  Goal: Absence of new skin breakdown  Description: 1. Monitor for areas of redness and/or skin breakdown  2. Assess vascular access sites hourly  3. Every 4-6 hours minimum:  Change oxygen saturation probe site  4. Every 4-6 hours:  If on nasal continuous positive airway pressure, respiratory therapy assess nares and determine need for appliance change or resting period.   Outcome: Progressing     Problem: Neurosensory - Adult  Goal: Achieves stable or improved neurological status  Outcome: Progressing  Goal: Absence of seizures  Outcome: Progressing     Problem: Respiratory - Adult  Goal: Achieves optimal ventilation and oxygenation  Outcome: Progressing     Problem: Cardiovascular - Adult  Goal: Maintains optimal cardiac output and hemodynamic stability  Outcome: Progressing     Problem: Skin/Tissue Integrity - Adult  Goal: Skin integrity remains intact  Outcome: Progressing  Flowsheets (Taken 9/16/2022 0241)  Skin Integrity Remains Intact: Monitor for areas of redness and/or skin breakdown     Problem: Musculoskeletal - Adult  Goal: Return mobility to safest level of function  Outcome: Progressing     Problem: Gastrointestinal - Adult  Goal: Minimal or absence of nausea and vomiting  Outcome: Progressing     Problem: Genitourinary - Adult  Goal: Absence of urinary retention  Outcome: Progressing  Goal: Urinary catheter remains patent  Outcome: Progressing     Problem: Metabolic/Fluid and Electrolytes - Adult  Goal: Electrolytes

## 2022-09-17 PROBLEM — E87.1 HYPONATREMIA: Status: ACTIVE | Noted: 2022-01-01

## 2022-09-17 NOTE — PROGRESS NOTES
Bedside report and Pt care transferred to [facility].. Pt denies any assistance at this time.

## 2022-09-17 NOTE — PROGRESS NOTES
Pt arrived to room 212 from ICU per w/c. Pt awake, alert and oriented. Telemetry and cont pulse ox on and confirmed with monitor tech that it is working. Urinal at bedside. Pt stated he doesn't feel like he needs to urinate yet. Bed alarm on. Call light in reach.  Omaira Zamora RN

## 2022-09-17 NOTE — PROGRESS NOTES
Occupational Therapy        OT Attempt  Attempted PT/OT treatment this morning. Pt declining OT/PT at this time and wishing to be left alone. Discussed with pt importance of OOB mobility and risks of immobilization, pt continued to declined but agreeable to attempt to get up to chair this afternoon. Will follow-up as schedule allows. Adrián Charles Proffer OTR/L [id number] [facility], PT #[id number]

## 2022-09-17 NOTE — PROGRESS NOTES
This RN rec report from [facility] to assume care of pt at this time . PTwas ready for transfer to  , hospitalist decided to hold pt in ICU r/t  low BP .      Pt in bed with eyes closed easily aroused , continuous cardiac monitoring ,pt caox3 resp even denies needs at this time

## 2022-09-17 NOTE — PLAN OF CARE
Problem: Discharge Planning  Goal: Discharge to home or other facility with appropriate resources  Outcome: Progressing     Problem: Safety - Adult  Goal: Free from fall injury  Outcome: Progressing     Problem: ABCDS Injury Assessment  Goal: Absence of physical injury  Outcome: Progressing     Problem: Skin/Tissue Integrity  Goal: Absence of new skin breakdown  Description: 1. Monitor for areas of redness and/or skin breakdown  2. Assess vascular access sites hourly  3. Every 4-6 hours minimum:  Change oxygen saturation probe site  4. Every 4-6 hours:  If on nasal continuous positive airway pressure, respiratory therapy assess nares and determine need for appliance change or resting period.   Outcome: Progressing     Problem: Neurosensory - Adult  Goal: Achieves stable or improved neurological status  Outcome: Progressing  Goal: Absence of seizures  Outcome: Progressing     Problem: Neurosensory - Adult  Goal: Absence of seizures  Outcome: Progressing     Problem: Metabolic/Fluid and Electrolytes - Adult  Goal: Electrolytes maintained within normal limits  Outcome: Progressing

## 2022-09-17 NOTE — FLOWSHEET NOTE
09/17/22 0800   Vitals   Temp 98 °F (36.7 °C)   Temp Source Oral   Heart Rate 72   Resp 15   BP 91/66   MAP (mmHg) 75   Oxygen Therapy   SpO2 98 %   Vital signs stable. BP's remain intermittently soft. MAPs maintaining above 65. Pt is alert and oriented X4 and denies any SOB, pain or dizziness at the moment. Pt remains neurologically intact. Nothing new noted on head to toe assessment. Pt declined skin assessment. Pt is NSR on the monitor. Morning medication administration completed. Normal saline continues at 125 ml/hr. Phos and K+ replete. Pt denies any further assistance at the moment. Will continue to monitor.

## 2022-09-17 NOTE — FLOWSHEET NOTE
09/17/22 0800   Vitals   Temp 98 °F (36.7 °C)   Temp Source Oral   Heart Rate 72   Resp 15   BP 91/66   MAP (mmHg) 75   Oxygen Therapy   SpO2 98 %

## 2022-09-17 NOTE — PROGRESS NOTES
Report per Haider Bass at transfer of care pt allred was removed at 1300  and pt has not voided.  This RN to [facility] asked pt to try to void pt stated \" I cant pee I dont have to\" , encouraged pt to stand pt refused to try , bladder scan performed 670ml of urine noted in , hospitalist Dr Jayna Anderson notified order  to encourage pt to stand if no void by 2100 ok to place allred     1815 This RN to bedside asking pt to try to stand and urinate pt refused to attempt

## 2022-09-17 NOTE — PROGRESS NOTES
Spoke with nephrology regarding Pt's hypotension. No new orders at this time. Okay per Dr. Aaliyah Reyes and Dr. Purvi Otoole for Pt to transfer to [place] [id number] at this time.

## 2022-09-17 NOTE — PROGRESS NOTES
Admit: 9/15/2022    Name:  Willi Pulliam  Room:  3015/3015-01  MRN:    [id number]    Daily Progress Note for 2022   A 78-year-old male with a history of hypertension, diabetes, schizophrenia admitted with altered mental status  Found to be in RANDI and hyponatremic  Interval History:   Mental status is now better. Scheduled Meds:   sodium chloride flush  5-40 mL IntraVENous 2 times per day    heparin (porcine)  5,000 Units SubCUTAneous BID    nicotine  1 patch TransDERmal Daily    mupirocin   Nasal BID    haloperidol  10 mg Oral Nightly    traZODone  150 mg Oral Nightly    venlafaxine  37.5 mg Oral Nightly       Continuous Infusions:   sodium chloride 125 mL/hr at 22 0557    sodium chloride         PRN Meds:  sodium chloride flush, sodium chloride, polyethylene glycol, acetaminophen **OR** acetaminophen, prochlorperazine                  Objective:     Temp  Av.4 °F (36.9 °C)  Min: 98 °F (36.7 °C)  Max: 98.8 °F (37.1 °C)  Pulse  Av  Min: 72  Max: 90  BP  Min: 88/70  Max: 106/76  SpO2  Av %  Min: 98 %  Max: 100 %  Patient Vitals for the past 4 hrs:   BP Pulse Resp SpO2   22 0800 91/66 72 15 98 %         Intake/Output Summary (Last 24 hours) at 2022 1032  Last data filed at 2022 0946  Gross per 24 hour   Intake 4456.36 ml   Output 1350 ml   Net 3106.36 ml       Physical Exam:  General:  Awake, alert and oriented. Appears to be not in any distress  Mucous Membranes:  Pink , anicteric  Neck: No JVD, no carotid bruit, no thyromegaly  Chest:  Clear to auscultation bilaterally, no added sounds  Cardiovascular:  RRR S1S2 heard, no murmurs or gallops  Abdomen:  Soft, undistended, non tender, no organomegaly, BS present  Extremities: No edema or cyanosis.  Distal pulses well felt  Neurological : Alert and oriented    Lab Data:  CBC:   Recent Labs     09/15/22  2042 22  0411   WBC 9.1 10.4   RBC 5.37 4.64   HGB 16.2 13.6   HCT 47.3 40.2*   MCV 88.2 86.7   RDW 13.2 13.2    197     BMP:   Recent Labs     09/16/22  0411 09/16/22  0813 09/16/22  2213 09/17/22  0410   *  --  132* 132*   K 2.7* 3.0* 3.2* 3.7   CL 81*  --  92* 95*   CO2 30  --  30 28   PHOS  --   --  1.7* 1.5*   *  --  120* 104*   CREATININE 2.9*  --  2.4* 2.2*     BNP: No results for input(s): BNP in the last 72 hours. PT/INR: No results for input(s): PROTIME, INR in the last 72 hours. APTT:No results for input(s): APTT in the last 72 hours. CARDIAC ENZYMES:   Recent Labs     09/15/22  2042 09/15/22  2359   TROPONINI 0.04* 0.03*     FASTING LIPID PANEL:  Lab Results   Component Value Date/Time    CHOL 135 02/29/2020 06:07 AM    HDL 46 02/29/2020 06:07 AM    TRIG 70 02/29/2020 06:07 AM     LIVER PROFILE:   Recent Labs     09/15/22  2042 09/16/22  0411   AST 17 15   ALT 11 10   BILITOT 0.6 0.5   ALKPHOS 109 90         CT HEAD WO CONTRAST   Final Result   No acute intracranial abnormality. XR CHEST PORTABLE   Final Result   No acute cardiopulmonary process               Assessment & Plan:     Patient Active Problem List    Diagnosis Date Noted    Paronychia of left thumb     Hypokalemia     Schizo affective schizophrenia ([facility] [address] 02/27/2020    Schizoaffective disorder, bipolar type ([facility] [facility] 08/23/2019    Severe protein-calorie malnutrition ([facility] 08/03/2019    Mood disorder (HCC)     Anemia     CKD (chronic kidney disease) stage 3, GFR 30-59 ml/min (HCC)     Hypertension, essential     Type 2 diabetes mellitus without complication, without long-term current use of insulin ([address]     Hyperlipidemia     Tobacco abuse     Marijuana use     MDD (major depressive disorder), single episode 08/02/2019    Pulmonary emboli ([address] 01/08/2015    Major depression 01/08/2015    Acute kidney injury ([facility] 01/08/2015    Malingering 01/06/2012    Psychosis ([facility] [address] 12/27/2011    Anxiety        Acute kidney injury on chronic kidney disease stage IIIa  Likely prerenal  Responding to IV fluids.   BUN continues to be very high  Nephrology consult. Continue IV normal saline 100 an hour    Hyponatremia  Has been responding to IV fluids    Acute metabolic encephalopathy  Resolved. Schizoaffective disorder  Suicidal ideation  Seen and cleared by psych  Continue Haldol Effexor and trazodone    Heparin for DVT prophylaxis  Full code    Transfer to [facility].     Victor Hugo Camilo MD

## 2022-09-17 NOTE — PROGRESS NOTES
[facility] Progress Note           Subjective/   59y.o. year old male who we are seeing in consultation for RANDI. HPI:  Renal function better with IVF's, non-oliguric. Intake reduced. ROS:  No shortness of breath, +weak. Objective/   GEN:  Chronically ill, BP 91/66   Pulse 72   Temp 98 °F (36.7 °C)   Resp 15   Ht 5' 6\" (1.676 m)   Wt 107 lb 12.9 oz (48.9 kg)   SpO2 98%   BMI 17.40 kg/m²   HEENT: non-icteric, no JVD  CV: S1, S2 without m/r/g; no LE edema  RESP: CTA B without w/r/r; breathing wnl  ABD: +bs, soft, nt, no hsm  SKIN: warm, no rashes    Data/  Recent Labs     09/15/22  2042 09/16/22  0411   WBC 9.1 10.4   HGB 16.2 13.6   HCT 47.3 40.2*   MCV 88.2 86.7    197     Recent Labs     09/15/22  2042 09/16/22  0411 09/16/22  0813 09/16/22  2213 09/17/22  0410   * 128*  --  132* 132*   K 3.5 2.7* 3.0* 3.2* 3.7   CL 74* 81*  --  92* 95*   CO2 29 30  --  30 28   GLUCOSE 153* 192*  --  86 91   PHOS  --   --   --  1.7* 1.5*   MG 3.20* 2.70*  --   --   --    * 166*  --  120* 104*   CREATININE 3.6* 2.9*  --  2.4* 2.2*   LABGLOM 17* 22*  --  28* 31*   GFRAA 21* 27*  --  34* 37*       Assessment/     - Acute kidney injury - pre-renal, fluid responsive, non-oliguric     - Chronic kidney disease stage 3 - baseline SCr 1.6     - Acute metabolic encephalopathy - better     - Hyponatremia - improving with volume     - Hypokalemia/Hypophosphatemia - prn replacement    Plan/     - Continue IVF's with NS - reduce to 100 ml/hour  - Trend labs, bp's, & urine output     ____________________________________  Gregory Mendoza MD  [facility]  [email or web address]  Office: [phone]

## 2022-09-18 NOTE — FLOWSHEET NOTE
09/17/22 2051   Vital Signs   Temp 97.7 °F (36.5 °C)   Temp Source Oral   Heart Rate 79   Heart Rate Source Monitor   Resp 18   BP 87/60   BP Location Right upper arm   BP Method Automatic   MAP (Calculated) 69   Patient Position Turns self   Level of Consciousness 0   MEWS Score 2   Oxygen Therapy   SpO2 100 %   O2 Device None (Room air)   Height and Weight   Height 5' 6\" (1.676 m)   Weight 117 lb 9.6 oz (53.3 kg)   Weight Method Bed scale   BSA (Calculated - sq m) 1.58 sq meters   BMI (Calculated) 19   Pt awake, alert and oriented. Pt refused all medications, po and subcut. Refused to use urinal and refused to have nurse place allred. Will continue to monitor. Per clinical, all dr's are aware of pt's low b/p. As long as MAP is over 65, they do not want to do anything else for it.   Chrissy Juarez RN

## 2022-09-18 NOTE — PROGRESS NOTES
Pt incontinent of large amount of urine. Pt unaware he was wet. Gown and linens changed. Pt placed in depends.  Chrissy Juarez RN

## 2022-09-18 NOTE — FLOWSHEET NOTE
09/18/22 1045   Vital Signs   Temp 98 °F (36.7 °C)   Temp Source Oral   Heart Rate 90   Heart Rate Source Monitor   Resp 18   /68   BP Location Left upper arm   MAP (Calculated) 83   Patient Position Supine   Level of Consciousness 0   MEWS Score 1   Oxygen Therapy   SpO2 98 %   O2 Device None (Room air)     Pt assessment completed, vss, see flow sheet. Pt was refusing all medications and vital signs until Dr. Dewayne Knight talked to patient. Pt has flat affect and gives short responses to answers. Pt took all ordered meds, except refused Nicotine patch and Heparin SQ. Pt denies any needs at this time.  Liset Marcano RN

## 2022-09-18 NOTE — PROGRESS NOTES
Pt still has not voided. Spoke to pt about concern about urinary retention since allred removed around 1300 today. Pt stated to \"give it a little bit longer. \" Told pt would check on him in 1 hour and if he hadn't voided we would need to put a allred back in. Pt verbalized understanding.   Claude Wilson RN

## 2022-09-18 NOTE — FLOWSHEET NOTE
09/18/22 0200   Vital Signs   Temp 97.3 °F (36.3 °C)   Temp Source Oral   Heart Rate 77   Heart Rate Source Monitor   Resp 18   BP (!) 93/59   BP Location Left upper arm   BP Method Automatic   MAP (Calculated) 70.33   Patient Position Turns self   Level of Consciousness 0   MEWS Score 2   Oxygen Therapy   SpO2 98 %   O2 Device None (Room air)   Pt awake, alert. Incontinent of large amount of urine.  Danuta Dickens RN

## 2022-09-18 NOTE — PLAN OF CARE
Problem: Discharge Planning  Goal: Discharge to home or other facility with appropriate resources  Outcome: Progressing     Problem: Safety - Adult  Goal: Free from fall injury  Outcome: Progressing     Problem: ABCDS Injury Assessment  Goal: Absence of physical injury  Outcome: Progressing     Problem: Skin/Tissue Integrity  Goal: Absence of new skin breakdown  Description: 1. Monitor for areas of redness and/or skin breakdown  2. Assess vascular access sites hourly  3. Every 4-6 hours minimum:  Change oxygen saturation probe site  4. Every 4-6 hours:  If on nasal continuous positive airway pressure, respiratory therapy assess nares and determine need for appliance change or resting period.   Outcome: Progressing     Problem: Neurosensory - Adult  Goal: Achieves stable or improved neurological status  Outcome: Progressing  Goal: Absence of seizures  Outcome: Progressing     Problem: Respiratory - Adult  Goal: Achieves optimal ventilation and oxygenation  Outcome: Progressing     Problem: Cardiovascular - Adult  Goal: Maintains optimal cardiac output and hemodynamic stability  Outcome: Progressing     Problem: Skin/Tissue Integrity - Adult  Goal: Skin integrity remains intact  Outcome: Progressing     Problem: Musculoskeletal - Adult  Goal: Return mobility to safest level of function  Outcome: Progressing     Problem: Gastrointestinal - Adult  Goal: Minimal or absence of nausea and vomiting  Outcome: Progressing     Problem: Genitourinary - Adult  Goal: Absence of urinary retention  Outcome: Progressing  Goal: Urinary catheter remains patent  Outcome: Progressing     Problem: Metabolic/Fluid and Electrolytes - Adult  Goal: Electrolytes maintained within normal limits  Outcome: Progressing     Problem: Pain  Goal: Verbalizes/displays adequate comfort level or baseline comfort level  Outcome: Progressing     Problem: Chronic Conditions and Co-morbidities  Goal: Patient's chronic conditions and co-morbidity symptoms are monitored and maintained or improved  Outcome: Progressing     Problem: Self Harm/Suicidality  Goal: Will have no self-injury during hospital stay  Description: INTERVENTIONS:  1. Q 30 MINUTES: Routine safety checks  2. Q SHIFT & PRN: Assess risk to determine if routine checks are adequate to maintain patient safety  Outcome: Progressing     Problem: Nutrition Deficit:  Goal: Optimize nutritional status  Outcome: Progressing     Problem: Behavior  Goal: Pt/Family maintain appropriate behavior and adhere to behavioral management agreement, if implemented  Description: INTERVENTIONS:  1. Assess patient/family's coping skills and  non-compliant behavior (including use of illegal substances)  2. Notify security of behavior or suspected illegal substances which indicate the need for search of the family and/or belongings  3. Encourage verbalization of thoughts and concerns in a socially appropriate manner  4. Utilize positive, consistent limit setting strategies supporting safety of patient, staff and others  5. Encourage participation in the decision making process about the behavioral management agreement  6. If a visitor's behavior poses a threat to safety call refer to organization policy.   7. Initiate consult with , Psychosocial CNS, Spiritual Care as appropriate  Outcome: Progressing

## 2022-09-18 NOTE — PROGRESS NOTES
Admit: 9/15/2022    Name:  Bertha Kimball  Room:  [facility]-  MRN:    [id number]    Daily Progress Note for 2022   A 70-year-old male with a history of hypertension, diabetes, schizophrenia admitted with altered mental status  Found to be in RANDI and hyponatremic  Interval History:   Now alert and oriented  Scheduled Meds:   magnesium sulfate  2,000 mg IntraVENous Once    potassium chloride  40 mEq Oral Once    sodium chloride flush  5-40 mL IntraVENous 2 times per day    heparin (porcine)  5,000 Units SubCUTAneous BID    nicotine  1 patch TransDERmal Daily    mupirocin   Nasal BID    haloperidol  10 mg Oral Nightly    traZODone  150 mg Oral Nightly    venlafaxine  37.5 mg Oral Nightly       Continuous Infusions:   sodium chloride 100 mL/hr at 22 0532    sodium chloride         PRN Meds:  sodium chloride flush, sodium chloride, polyethylene glycol, acetaminophen **OR** acetaminophen, prochlorperazine                  Objective:     Temp  Av °F (36.7 °C)  Min: 97.3 °F (36.3 °C)  Max: 98.8 °F (37.1 °C)  Pulse  Av.6  Min: 70  Max: 83  BP  Min: 78/63  Max: 94/70  SpO2  Av.6 %  Min: 97 %  Max: 100 %  No data found. Intake/Output Summary (Last 24 hours) at 2022 0901  Last data filed at 2022 0532  Gross per 24 hour   Intake 2658.05 ml   Output 1000 ml   Net 1658.05 ml         Physical Exam:  General:  Awake, alert and oriented. Appears to be not in any distress  Mucous Membranes:  Pink , anicteric  Neck: No JVD, no carotid bruit, no thyromegaly  Chest:  Clear to auscultation bilaterally, no added sounds  Cardiovascular:  RRR S1S2 heard, no murmurs or gallops  Abdomen:  Soft, undistended, non tender, no organomegaly, BS present  Extremities: No edema or cyanosis.  Distal pulses well felt  Neurological : Alert and oriented    Lab Data:  CBC:   Recent Labs     09/15/22  2042 09/16/22  0411   WBC 9.1 10.4   RBC 5.37 4.64   HGB 16.2 13.6   HCT 47.3 40.2*   MCV 88.2 86.7   RDW 13.2 13.2  197       BMP:   Recent Labs     09/16/22  2213 09/17/22  0410 09/18/22  0542   * 132* 137   K 3.2* 3.7 3.1*   CL 92* 95* 102   CO2 30 28 26   PHOS 1.7* 1.5* 1.7*   * 104* 62*   CREATININE 2.4* 2.2* 1.8*       BNP: No results for input(s): BNP in the last 72 hours. PT/INR: No results for input(s): PROTIME, INR in the last 72 hours. APTT:No results for input(s): APTT in the last 72 hours. CARDIAC ENZYMES:   Recent Labs     09/15/22  2042 09/15/22  2359   TROPONINI 0.04* 0.03*       FASTING LIPID PANEL:  Lab Results   Component Value Date/Time    CHOL 135 02/29/2020 06:07 AM    HDL 46 02/29/2020 06:07 AM    TRIG 70 02/29/2020 06:07 AM     LIVER PROFILE:   Recent Labs     09/15/22  2042 09/16/22  0411   AST 17 15   ALT 11 10   BILITOT 0.6 0.5   ALKPHOS 109 90           CT HEAD WO CONTRAST   Final Result   No acute intracranial abnormality. XR CHEST PORTABLE   Final Result   No acute cardiopulmonary process               Assessment & Plan:     Patient Active Problem List    Diagnosis Date Noted    Hyponatremia 09/17/2022    Paronychia of left thumb     Hypokalemia     Schizo affective schizophrenia ([facility] [address] 02/27/2020    Schizoaffective disorder, bipolar type ([facility] [address] 08/23/2019    Severe protein-calorie malnutrition ([address] 08/03/2019    Mood disorder (HCC)     Anemia     CKD (chronic kidney disease) stage 3, GFR 30-59 ml/min (HCC)     Hypertension, essential     Type 2 diabetes mellitus without complication, without long-term current use of insulin ([address]     Hyperlipidemia     Tobacco abuse     Marijuana use     MDD (major depressive disorder), single episode 08/02/2019    Pulmonary emboli ([address] 01/08/2015    Major depression 01/08/2015    Acute kidney injury ([address] 01/08/2015    Malingering 01/06/2012    Psychosis ([address] 12/27/2011    Anxiety        Acute kidney injury on chronic kidney disease stage IIIa  Likely prerenal  Responding to IV fluids.   BUN continues to be very high- coming down  Nephrology consult. Continue IV normal saline at 100 an hour    Hyponatremia  Resolved with IV fluids    Replace magnesium, potassium and phosphorus    Acute metabolic encephalopathy  Resolved.     Schizoaffective disorder  Suicidal ideation  Seen and cleared by psych  Continue Haldol Effexor and trazodone    Heparin for DVT prophylaxis  Full code      Kaylen Prescott MD

## 2022-09-18 NOTE — PROGRESS NOTES
Occupational/ Physical Therapy        OT/PT Attempt  Attempted PT/OT treatment this afternoon. Pt declining therapy eval at this time. Pt perseverating on ex-wife and inability to return home. Pt declined OOB activity stating\" how am I supposed to do that? \". Pt unable to be redirected. Discussed with pt importance of OOB mobility and risks of immobilization. Will follow-up tomorrow as pt status allows. Adrián Navarro OTR/L [address] [facility], PT #[id number]

## 2022-09-18 NOTE — PROGRESS NOTES
[facility] Progress Note           Subjective/   59y.o. year old male who we are seeing in consultation for RANDI. HPI:  Renal function better with IVF's, non-oliguric. Intake reduced. ROS:  No shortness of breath, +weak. Objective/   GEN:  Chronically ill, BP (!) 93/59   Pulse 77   Temp 97.3 °F (36.3 °C) (Oral)   Resp 18   Ht 5' 6\" (1.676 m)   Wt 110 lb (49.9 kg)   SpO2 98%   BMI 17.75 kg/m²   HEENT: non-icteric, no JVD  CV: S1, S2 without m/r/g; no LE edema  RESP: CTA B without w/r/r; breathing wnl  ABD: +bs, soft, nt, no hsm  SKIN: warm, no rashes    Data/  Recent Labs     09/15/22  2042 09/16/22  0411   WBC 9.1 10.4   HGB 16.2 13.6   HCT 47.3 40.2*   MCV 88.2 86.7    197       Recent Labs     09/15/22  2042 09/16/22  0411 09/16/22  0813 09/16/22  2213 09/17/22  0410 09/18/22  0542   * 128*  --  132* 132* 137   K 3.5 2.7*   < > 3.2* 3.7 3.1*   CL 74* 81*  --  92* 95* 102   CO2 29 30  --  30 28 26   GLUCOSE 153* 192*  --  86 91 90   PHOS  --   --   --  1.7* 1.5* 1.7*   MG 3.20* 2.70*  --   --   --  1.40*   * 166*  --  120* 104* 62*   CREATININE 3.6* 2.9*  --  2.4* 2.2* 1.8*   LABGLOM 17* 22*  --  28* 31* 39*   GFRAA 21* 27*  --  34* 37* 47*    < > = values in this interval not displayed. Assessment/     - Acute kidney injury - pre-renal, fluid responsive, non-oliguric     - Chronic kidney disease stage 3 - baseline SCr 1.6     - Acute metabolic encephalopathy - better     - Hyponatremia - improving with volume     -Hypokalemia/Hypophosphatemia/Hypomagnesemia/Hypocalcemia - prn replacement    Plan/     - Continue IVF's with NS - reduce to 75 ml/hour  - Trend labs, bp's, & urine output     ____________________________________  Bhavna Wilcox MD  [facility]  www."[employer]"  Office: [phone]

## 2022-09-19 PROBLEM — N19 UREMIA: Status: ACTIVE | Noted: 2022-01-01

## 2022-09-19 PROBLEM — R45.851 SUICIDAL IDEATION: Status: ACTIVE | Noted: 2022-01-01

## 2022-09-19 PROBLEM — R62.7 FAILURE TO THRIVE IN ADULT: Status: ACTIVE | Noted: 2022-01-01

## 2022-09-19 NOTE — PROGRESS NOTES
Inpatient Occupational Therapy  Evaluation and Treatment    Unit: 2 [place]  Date:  9/19/2022  Patient Name:    Sarah Harrell  Admitting diagnosis:  Suicidal ideation [R45.851]  Uremia [N19]  Elevated troponin [R77.8]  Failure to thrive in adult [R62.7]  Acute kidney injury ([facility] [address] [N17.9]  Acute kidney injury superimposed on CKD ([facility] [address] [N17.9, N18.9]  Admit Date:  9/15/2022  Precautions/Restrictions/WB Status/ Lines/ Wounds/ Oxygen:   Fall risk, Bed/chair alarm, Lines -IV, and Telemetry  Treatment Time:  5589-3364   Treatment Number: 1   Timed code treatment minutes 25 minutes   Total Treatment minutes:   35   minutes    Patient Goals for Therapy:  \" not stated  \"      Discharge Recommendations: SNF  DME needs for discharge: defer to facility       Therapy recommendations for staff:   Assist of 1 with use of No AD for all transfers to/from chair  to/from bathroom with gait belt     History of Present Illness: per H&P   59 y.o. male with PMH below, presented to ED by way of police. Apparently they were summoned for a wellness check. Pt reportedly recently made suicidal statements to family members. They also had not seen him in 3 weeks. He was reportedly found by police so weak that he could not stand and he had to be carried out to the car. He was reportedly covered in stool and urine. He has been combative w/ staff and has required chemical and physical restraints. He is cooperative and not willing to participate in history. He has Hx of schizophrenia per staff. He has required admission for psychosis. Home Health S4 Level Recommendation:  NA  AM-PAC Score: 20    Preadmission Environment    Pt. Lives Alone  Home environment:  apartment   Steps to enter first floor: No steps  Steps to second floor: N/A  Bathroom: walk in shower and standard height commode  Equipment owned: none    Preadmission Status:  Pt. Able to drive: No EX wife takes pt   Pt Fully independent with ADLs: Yes  Pt.  Required assistance from family for: Laundry  pt reports he cooks and cleans   Pt. independent for transfers and gait and walked with No Device  History of falls No    Pain  No  Rating:NA  Location:NA  Pain Medicine Status: No request made      Cognition    A&O Person, Place, Time, and Situation   Able to follow 2 step commands    Subjective  Patient lying supine in bed with no family present. Pt agreeable to this OT eval & tx. Upper Extremity ROM:    WFL    Upper Extremity Strength:    WFL      Upper Extremity Sensation    WFL    Upper Extremity Proprioception:  WFL    Coordination and Tone  WFL    Balance  Functional Sitting Balance:  WFL  Functional Standing Balance:Diminished    Bed mobility:    Supine to sit: Independent  Sit to supine:   Not Tested  Rolling:    Not Tested  Scooting in sitting:  Independent  Scooting to head of bed:   Not Tested    Bridging:   Not Tested    Transfers:    Sit to stand:  SBA  Stand to sit:  SBA  Bed to chair:   CGA  Standard toilet: Not Tested  Bed to [facility]:  Not Tested    Dressing:      UE:   Not Tested  LE:    SBA- to don pants     Bathing:    UE:  Not Tested  LE:  Not Tested    Eating:   Not Tested    Toileting:  Not Tested    Activity Tolerance   Pt completed therapy session with decreased balance, decreased safety awareness     Positioning Needs:   Pt in bed, alarm set, positioned in proper neutral alignment and pressure relief provided. Exercise / Activities Initiated:     NA   Patient/Family Education:   Role of OT     Assessment of Deficits: Pt seen for Occupational therapy evaluation in acute care setting. Pt demonstrated decreased Activity tolerance, ADLs, Balance , Bed mobility, Safety Awareness, and Transfers. Pt functioning below baseline and will likely benefit from skilled occupational therapy services to maximize safety and independence. Goal(s) : To be met in 3 Visits:  1). Bed to toilet/BSC: SBA    To be met in 5 Visits:  1). Supine to/from Sit:  Independent  2).  Upper Body Bathing:   Independent  3). Lower Body Bathing:   SBA  4). Upper Body Dressing:  Independent  5). Lower Body Dressing:  SBA  6). Pt to demonstrate UE exs x 15 reps with minimal cues    Rehabilitation Potential:  Fair for goals listed above. Strengths for achieving goals include: PLOF  Barriers to achieving goals include:  Complexity of condition     Plan: To be seen 3-5 x/wk while in acute care setting for therapeutic exercises, bed mobility, transfers, dressing, bathing, family/patient education, ADL/IADL retraining, energy conservation training.      Brittni Mealing OTR/L [id number]           If patient discharges from this facility prior to next visit, this note will serve as the Discharge Summary

## 2022-09-19 NOTE — CARE COORDINATION
INTERDISCIPLINARY PLAN OF CARE CONFERENCE    Date/Time: 9/19/2022 3:12 PM  Completed by: ANA Holt   Case Management      Patient Name:  Aniket Friend  YOB: 1965  Admitting Diagnosis: Suicidal ideation [R45.851]  Uremia [N19]  Elevated troponin [R77.8]  Failure to thrive in adult [R62.7]  Acute kidney injury ([facility] [address] [N17.9]  Acute kidney injury superimposed on CKD ([facility] [address] [N17.9, N18.9]     Admit Date/Time:  9/15/2022  6:46 PM    Chart reviewed. Interdisciplinary team contacted or reviewed plan related to patient progress and discharge plans. Disciplines included Case Management, Nursing, and Dietitian. Current Status:ongoing   PT/OT recommendation for discharge plan of care: SNF    Expected D/C Disposition:  Home  Confirmed plan with patient and/or family Yes confirmed with: (name) pt  Met with:pt    Discharge Plan Comments: Chart review completed. Met with pt at bedside. Discussed SNF recommendations with pt and pt stated he doesn't want SNF or HHC at home. Offered to leave pt list to review and he stated \"don't bother\".  He was reluctant with discussing plans    Home O2 in place on admit: No

## 2022-09-19 NOTE — PROGRESS NOTES
Changed pt's gown and sheets and depend and pad. Everything was soaked with urine. Changed pt and gave him a new gown/sheets/depend/bed pad.  Haylie Olvera RN

## 2022-09-19 NOTE — PROGRESS NOTES
Admit: 9/15/2022    Name:  Alex Sinclair  Room:    MRN:    [id number]    Daily Progress Note for 2022     A 27-year-old male with a history of hypertension, diabetes, schizophrenia admitted with altered mental status  Found to be in RANDI and hyponatremic      Interval History:     Mr Krysten Jason seen up in bed , calm and cooperative   Feels fine today        Scheduled Meds:   sodium chloride flush  5-40 mL IntraVENous 2 times per day    heparin (porcine)  5,000 Units SubCUTAneous BID    nicotine  1 patch TransDERmal Daily    mupirocin   Nasal BID    haloperidol  10 mg Oral Nightly    traZODone  150 mg Oral Nightly    venlafaxine  37.5 mg Oral Nightly       Continuous Infusions:   sodium chloride 75 mL/hr at 22 0109    sodium chloride         PRN Meds:  sodium chloride flush, sodium chloride, polyethylene glycol, acetaminophen **OR** acetaminophen, prochlorperazine                  Objective:     Temp  Av.8 °F (36.6 °C)  Min: 97.3 °F (36.3 °C)  Max: 98.6 °F (37 °C)  Pulse  Av.5  Min: 80  Max: 90  BP  Min: 108/72  Max: 117/74  SpO2  Av %  Min: 98 %  Max: 100 %  Patient Vitals for the past 4 hrs:   BP Temp Temp src Pulse Resp SpO2   22 0330 108/72 97.3 °F (36.3 °C) Oral 86 16 98 %           Intake/Output Summary (Last 24 hours) at 2022 0726  Last data filed at 2022 0109  Gross per 24 hour   Intake 2955.22 ml   Output --   Net 2955.22 ml         Physical Exam:  General:  thin middle aged male, appears withdrawn  Awake, alert and oriented. Appears to be not in any distress  Mucous Membranes:  Pink , anicteric  Neck: No JVD, no carotid bruit, no thyromegaly  Chest:  Clear to auscultation bilaterally, no added sounds  Cardiovascular:  RRR S1S2 heard, no murmurs or gallops  Abdomen:  Soft, sunken, undistended, non tender, no organomegaly, BS present  Extremities: No edema or cyanosis.  Distal pulses well felt  Neurological : non focal Alert and oriented    Lab Data:  CBC:   No results for input(s): WBC, RBC, HGB, HCT, MCV, RDW, PLT in the last 72 hours. BMP:   Recent Labs     09/17/22  0410 09/18/22  0542 09/19/22  0559   * 137 132*   K 3.7 3.1* 3.3*   CL 95* 102 100   CO2 28 26 24   PHOS 1.5* 1.7* 1.3*   * 62* 34*   CREATININE 2.2* 1.8* 1.6*     LIVER PROFILE:   No results for input(s): AST, ALT, ALB, BILIDIR, BILITOT, ALKPHOS in the last 72 hours. CT HEAD WO CONTRAST   Final Result   No acute intracranial abnormality. XR CHEST PORTABLE   Final Result   No acute cardiopulmonary process               Assessment & Plan:         Acute kidney injury on chronic kidney disease stage IIIa  Likely prerenal  Responding to IV fluids. BUN and creatinine coming down  Nephrology consulted  Continue IV normal saline    Hyponatremia  Resolved with IV fluids    Electrolyte disorder - Replace magnesium, potassium and phosphorus    Acute metabolic encephalopathy  Resolved.     Schizoaffective disorder  Suicidal ideation  Seen and cleared by psych  Continue Haldol Effexor and trazodone  Cooperative today     Heparin for DVT prophylaxis  Full code      Piedad Cole MD

## 2022-09-19 NOTE — PROGRESS NOTES
Have noticed a behavioral pattern that when pt called out to be changed within minutes pt takes off his cont pulse ox.  Replaced pulse ox and explained that he needs to keep pulse ox on. Kiran Valladares RN

## 2022-09-19 NOTE — FLOWSHEET NOTE
09/18/22 2055   Vital Signs   Temp 98.6 °F (37 °C)   Temp Source Oral   Heart Rate 86   Resp 18   /73   BP Location Left upper arm   MAP (Calculated) 86.33   Level of Consciousness 0   MEWS Score 1   Oxygen Therapy   SpO2 100 %   O2 Device None (Room air)   Pt has been changed for urinary and stool incontinence multiple times. Pt knows when he needs to urinate or have a bowel movement but waits to call after he has already gone. Pt took evening meds except heparin shot. Pt ate pudding and drank juice. Pt asked for writer to feed him but pt was perfectly capable to feed himself.  Krysten David RN

## 2022-09-19 NOTE — PROGRESS NOTES
[facility] Progress Note           Subjective/   59y.o. year old male who we are seeing in consultation for RANDI. HPI:  Patient reports that he is eating better  Getting potassium phosphate, magnesium infusion     ROS:  No shortness of breath, +weak. Scheduled Meds:   potassium phosphate IVPB  15 mmol IntraVENous Once    magnesium sulfate  2,000 mg IntraVENous Once    sodium chloride flush  5-40 mL IntraVENous 2 times per day    heparin (porcine)  5,000 Units SubCUTAneous BID    nicotine  1 patch TransDERmal Daily    haloperidol  10 mg Oral Nightly    traZODone  150 mg Oral Nightly    venlafaxine  37.5 mg Oral Nightly     Continuous Infusions:   sodium chloride 75 mL/hr at 09/19/22 0109    sodium chloride       PRN Meds:.sodium chloride flush, sodium chloride, polyethylene glycol, acetaminophen **OR** acetaminophen, prochlorperazine      Objective/   GEN:  Chronically ill, /72   Pulse 86   Temp 97.3 °F (36.3 °C) (Oral)   Resp 16   Ht 5' 6\" (1.676 m)   Wt 110 lb (49.9 kg)   SpO2 98%   BMI 17.75 kg/m²   HEENT: non-icteric, no JVD  CV: S1, S2 without m/r/g; no LE edema  RESP: CTA B without w/r/r; breathing wnl  ABD: +bs, soft, nt, no hsm  SKIN: warm, no rashes    Data/  No results for input(s): WBC, HGB, HCT, MCV, PLT in the last 72 hours.     Recent Labs     09/17/22  0410 09/18/22  0542 09/19/22  0559   * 137 132*   K 3.7 3.1* 3.3*   CL 95* 102 100   CO2 28 26 24   GLUCOSE 91 90 92   PHOS 1.5* 1.7* 1.3*   MG  --  1.40* 1.30*   * 62* 34*   CREATININE 2.2* 1.8* 1.6*   LABGLOM 31* 39* 44*   GFRAA 37* 47* 54*         Assessment/     - Acute kidney injury - pre-renal, fluid responsive, non-oliguric     - Chronic kidney disease stage 3 - baseline SCr 1.6     - Acute metabolic encephalopathy - better     - Hyponatremia - improving with volume     -Hypokalemia/Hypophosphatemia/Hypomagnesemia/Hypocalcemia - prn replacement    Plan/   -Change IV fluid to 75 mill per

## 2022-09-19 NOTE — FLOWSHEET NOTE
09/19/22 0915   Vital Signs   Temp 97.5 °F (36.4 °C)   Temp Source Oral   Heart Rate (!) 105   Heart Rate Source Monitor   Resp 16   /72   BP Location Left upper arm   BP Method Automatic   MAP (Calculated) 88.67   Level of Consciousness 0   MEWS Score 2   Oxygen Therapy   SpO2 99 %   O2 Device None (Room air)     Pt assessment completed, vss, see flow sheet. Pt alert and oriented x 4. Pt refused Heparin and Nicotine patch again this am.  Pt states he can not do anything for himself. Pt denies pain or any needs at this time.  Lynne Silverman, RN

## 2022-09-19 NOTE — PROGRESS NOTES
Inpatient Physical Therapy Evaluation and Treatment    Unit: [address]  Date:  9/19/2022  Patient Name:    Maurilio Lopez  Admitting diagnosis:  Suicidal ideation [R45.851]  Uremia [N19]  Elevated troponin [R77.8]  Failure to thrive in adult [R62.7]  Acute kidney injury ([facility] [address] [N17.9]  Acute kidney injury superimposed on CKD ([facility] [address] [N17.9, N18.9]  Admit Date:  9/15/2022  Precautions/Restrictions/WB Status/ Lines/ Wounds/ Oxygen: Fall risk, Bed/chair alarm, Lines -IV, and Telemetry    Treatment Time:  10:05 - 10:30  Treatment Number:  1   Timed Code Treatment Minutes: 15 minutes  Total Treatment Minutes:  25  minutes    Patient Goals for Therapy: none stated      Discharge Recommendations: SNF  DME needs for discharge: defer to facility       Therapy recommendation for EMS Transport: can transport by wheelchair    Therapy recommendations for staff:   Assist of 1 with use of No AD and gait belt for all transfers and ambulation to/from [facility]  to/from chair  to/from bathroom  within room    History of Present Illness: per H&P   59 y.o. male with PMH below, presented to ED by way of police. Apparently they were summoned for a wellness check. Pt reportedly recently made suicidal statements to family members. They also had not seen him in 3 weeks. He was reportedly found by police so weak that he could not stand and he had to be carried out to the car. He was reportedly covered in stool and urine. He has been combative w/ staff and has required chemical and physical restraints. He is cooperative and not willing to participate in history. He has Hx of schizophrenia per staff. He has required admission for psychosis    Home Health S4 Level Recommendation:  NA  AM-PAC Mobility Score       AM-PAC Inpatient Mobility without Stair Climbing Raw Score : 15    Preadmission Environment    Pt.  Lives Alone  Home environment:    apartment   Steps to enter first floor: No steps  Steps to second floor: N/A  Bathroom: walk in shower and standard height commode  Equipment owned: none     Preadmission Status:  Pt. Able to drive: No EX wife takes pt   Pt Fully independent with ADLs: Yes  Pt. Required assistance from family for: Laundry  pt reports he cooks and cleans   Pt. independent for transfers and gait and walked with No Device  History of falls No    Pain   No  Location:   Rating: NA /10  Pain Medicine Status: Denies need    Cognition    A&O x4   Able to follow 2 step commands    Subjective  Patient lying supine in bed with no family present. Pt agreeable to this PT eval & tx. Upper Extremity ROM/Strength  Please see OT evaluation. Lower Extremity ROM / Strength   AROM WFL: Yes  ROM limitations:     BLE strength impaired, but not formally assessed with MMT. Lower Extremity Sensation    WFL    Lower Extremity Proprioception:   NT    Coordination and Tone  NT    Balance  Sitting:  Good ; SBA  Comments: EOB    Standing: Fair ; Min A   Comments:     Bed Mobility   Supine to Sit:    SBA  Sit to Supine:   SBA  Rolling:   SBA  Scooting in sitting: SBA  Scooting in supine:  Not Tested    Transfer Training     Sit to stand:   CGA  Stand to sit:   CGA  Bed to Chair:   Min A  with use of No AD and gait belt    Gait gait completed as indicated below  Distance:      20 ft + 5 ft  Deviations (firm surface/linoleum):  decreased chapis, narrow ALESHA, forward flexed posture, step through pattern, scissoring, and decreased step length bilaterally  Assistive Device Used:    No AD and gait belt  Level of Assist:    CGA and Min A   Comment: unsteady at times, declined use of AD    Stair Training deferred, pt unsafe/ not appropriate to complete stairs at this time    Activity Tolerance   Pt completed therapy session with No adverse symptoms noted w/activity    Positioning Needs   Pt in bed, alarm set, positioned in proper neutral alignment and pressure relief provided.    Call light provided and all needs within reach  Attempted to leave pt up in chair, however pt impulsive and continued standing up to transfer back to bed without calling. Exercises Initiated  Beverly deferred secondary to pt declined  NA    Other  None. Patient/Family Education   Pt educated on role of inpatient PT, POC, importance of continued activity, DC recommendations, safety awareness, transfer techniques, pacing activity, and calling for assist with mobility. Assessment  Pt seen for Physical Therapy evaluation in acute care setting. Pt demonstrated decreased Activity tolerance, Balance, Safety, and Strength as well as decreased independence with Ambulation, Bed Mobility , and Transfers. Recommending SNF upon discharge as patient functioning well below baseline, demonstrates good rehab potential and unable to return home due to limited or no family support, burden of care beyond caregiver ability, home environment not conducive to patient recovery, and limited safety awareness. Goals : To be met in 3 visits:  1). Independent with LE Ex x 10 reps    To be met in 6 visits:  1). Supine to/from sit: Independent  2). Sit to/from stand: Independent  3). Bed to chair: Supervision  4). Gait: Ambulate 150 ft.  with  Supervision and use of LRAD (least restrictive assistive device)  5). Tolerate B LE exercises 3 sets of 10-15 reps    Rehabilitation Potential: Good  Strengths for achieving goals include:   PLOF   Barriers to achieving goals include:    Lack of motivation    Plan    To be seen 3-5 x / week  while in acute care setting for therapeutic exercises, bed mobility, transfers, progressive gait training, balance training, and family/patient education. Signature: Feng Scott PT, DPT, OMT-C  #[id number]      If patient discharges from this facility prior to next visit, this note will serve as the Discharge Summary.

## 2022-09-20 PROBLEM — F33.9 MAJOR DEPRESSIVE DISORDER, RECURRENT (HCC): Status: ACTIVE | Noted: 2022-01-01

## 2022-09-20 NOTE — FLOWSHEET NOTE
Purposeful Rounding    Patient concerns reported:NONE  Nurse made aware:NO    Patient Turned and repositioned: Independent    Patient Toileted: Independent    Fall Precautions in Place: Yellow non-skid socks on, Lighting appropriate, Room free of clutter, and Clear path to bathroom      Electronically signed by Nena Chan on 9/20/22 at 3:42 PM EDT

## 2022-09-20 NOTE — PROGRESS NOTES
[facility] Progress Note           Subjective/   59y.o. year old male who we are seeing in consultation for RANDI. HPI:  Patient is refusing labs this morning  On 9/19, patient received potassium and magnesium and phosphorus along with calcium IV    ROS:  No shortness of breath, +weak. Scheduled Meds:   phosphorus  500 mg Oral BID    sodium chloride flush  5-40 mL IntraVENous 2 times per day    heparin (porcine)  5,000 Units SubCUTAneous BID    nicotine  1 patch TransDERmal Daily    haloperidol  10 mg Oral Nightly    traZODone  150 mg Oral Nightly    venlafaxine  37.5 mg Oral Nightly     Continuous Infusions:   sodium chloride       PRN Meds:.sodium chloride flush, sodium chloride, polyethylene glycol, acetaminophen **OR** acetaminophen, prochlorperazine      Objective/   GEN:  Chronically ill, /84   Pulse (!) 111   Temp 97.5 °F (36.4 °C) (Oral)   Resp 16   Ht 5' 6\" (1.676 m)   Wt 110 lb (49.9 kg)   SpO2 99%   BMI 17.75 kg/m²   HEENT: non-icteric, no JVD  CV: S1, S2 without m/r/g; no LE edema  RESP: CTA B without w/r/r; breathing wnl  ABD: +bs, soft, nt, no hsm  SKIN: warm, no rashes    Data/  No results for input(s): WBC, HGB, HCT, MCV, PLT in the last 72 hours.     Recent Labs     09/18/22  0542 09/19/22  0559    132*   K 3.1* 3.3*    100   CO2 26 24   GLUCOSE 90 92   PHOS 1.7* 1.3*   MG 1.40* 1.30*   BUN 62* 34*   CREATININE 1.8* 1.6*   LABGLOM 39* 44*   GFRAA 47* 54*         Assessment/     - Acute kidney injury - pre-renal, fluid responsive, non-oliguric     - Chronic kidney disease stage 3 - baseline SCr 1.6     - Acute metabolic encephalopathy - better     - Hyponatremia - improving with volume     -Hypokalemia/Hypophosphatemia/Hypomagnesemia/Hypocalcemia - prn replacement    Plan/   -IV fluid 75 mill per hour with LR  - Trend labs, bp's, & urine output     ____________________________________  Erika Madrigal MD  [facility] [address] [place] [address]. [facility]  Office: [phone]

## 2022-09-20 NOTE — PROGRESS NOTES
Darren with [facility] in to see patient, consent for voluntary signed. Rapid covid lab sent.

## 2022-09-20 NOTE — BH NOTE
Moraima Slaughter arrived from  via wheelchair accompanied by his  nurse and security. He was oriented to the unit and to his room at this time.

## 2022-09-20 NOTE — BH NOTE
Vitaly's ex wife called the unit stating that she was on her way to pick him up to take him home. She called [facility] and they transferred her to this unit. This nurse spoke to [facility] and asked him if we could release any information to his ex wife. He said \"No\". No further explanation was offered by [facility]. [facility] also stated that he does not want to do the admission interview or sign any paperwork at this time.

## 2022-09-20 NOTE — PROGRESS NOTES
Patient walking in halls, throwing mask on floor, refusing to stay in room. He is dressed and wandering. [facility] aware.

## 2022-09-20 NOTE — PROGRESS NOTES
Asked to see patient due to suicidal ideation. When I saw him he was quiet and angry. He was sitting on the couch with his arms cross, tapopin his foot. Eye contact was poor. He did not reply to questions. Ultimately I asked him if he was suicidal and he said, \"I'm done with life. \"  He agrees to be admitted to psychiatry.

## 2022-09-20 NOTE — PROGRESS NOTES
Admit: 9/15/2022    Name:  Nata Law  Room:  -  MRN:    [id number]    Daily Progress Note for 2022     A 61-year-old male with a history of hypertension, diabetes, schizophrenia admitted with altered mental status  Found to be in RANDI and hyponatremic      Interval History:     Mr Mag Hawley seen up in bed , refusing all labs and exam     I have tried explaining him the importance of checking labs and pt refuses and does not want to stay   Curled up in bed, pulled out IV , pulled out Tele leads          Scheduled Meds:   phosphorus  500 mg Oral BID    sodium chloride flush  5-40 mL IntraVENous 2 times per day    heparin (porcine)  5,000 Units SubCUTAneous BID    nicotine  1 patch TransDERmal Daily    haloperidol  10 mg Oral Nightly    traZODone  150 mg Oral Nightly    venlafaxine  37.5 mg Oral Nightly       Continuous Infusions:   lactated ringers 75 mL/hr at 22 0226    sodium chloride         PRN Meds:  sodium chloride flush, sodium chloride, polyethylene glycol, acetaminophen **OR** acetaminophen, prochlorperazine                  Objective:     Temp  Av.5 °F (36.4 °C)  Min: 97.5 °F (36.4 °C)  Max: 97.5 °F (36.4 °C)  Pulse  Av.7  Min: 95  Max: 111  BP  Min: 112/71  Max: 131/84  SpO2  Av.3 %  Min: 99 %  Max: 100 %  No data found. Intake/Output Summary (Last 24 hours) at 2022 0737  Last data filed at 2022 1812  Gross per 24 hour   Intake 180 ml   Output --   Net 180 ml         Physical Exam:  General:  thin middle aged male, appears withdrawn  Awake, alert and oriented. Appears to be not in any distress    Chest:  Clear to auscultation bilaterally, no added sounds  Cardiovascular:  RRR S1S2 heard, no murmurs or gallops    Extremities: No edema or cyanosis.  Distal pulses well felt  Neurological : non focal Alert and oriented    Exam limited as pt is non cooperative    Lab Data:  CBC:   No results for input(s): WBC, RBC, HGB, HCT, MCV, RDW, PLT in the last 72 hours.    BMP:   Recent Labs     09/18/22  0542 09/19/22  0559    132*   K 3.1* 3.3*    100   CO2 26 24   PHOS 1.7* 1.3*   BUN 62* 34*   CREATININE 1.8* 1.6*     LIVER PROFILE:   No results for input(s): AST, ALT, ALB, BILIDIR, BILITOT, ALKPHOS in the last 72 hours. CT HEAD WO CONTRAST   Final Result   No acute intracranial abnormality. XR CHEST PORTABLE   Final Result   No acute cardiopulmonary process               Assessment & Plan:         Acute kidney injury on chronic kidney disease stage IIIa  Likely prerenal and poor oral intake  Responding to IV fluids. BUN and creatinine coming down   Nephrology consulted  Continue IV normal saline but pt very non cooperative and today he pulled out IV and does not want any fluids or labs to monitor   He feels fine     I tried to explain the importance of monitoring his electrolytes for treatment and associated heart issues and muscle weakness but pt refuses . I also mentioned that I cannot keep him here if he refuses labs   And he says he understands . Seen and cleared by psych   Will dc to home      Hyponatremia  Resolved with IV fluids    Electrolyte disorder - Replaced magnesium, potassium and phosphorus as needed but pt refus    Acute metabolic encephalopathy  Resolved.  Remains irritable and non cooperative       Schizoaffective disorder  Suicidal ideation  Seen and cleared by psych  Continue Haldol Effexor and trazodone      Heparin for DVT prophylaxis  Full code    Dc to home    Jane Ho MD

## 2022-09-20 NOTE — PLAN OF CARE
symptoms are monitored and maintained or improved  Outcome: Progressing     Problem: Self Harm/Suicidality  Goal: Will have no self-injury during hospital stay  Description: INTERVENTIONS:  1. Q 30 MINUTES: Routine safety checks  2. Q SHIFT & PRN: Assess risk to determine if routine checks are adequate to maintain patient safety  Outcome: Progressing     Problem: Nutrition Deficit:  Goal: Optimize nutritional status  Outcome: Progressing     Problem: Behavior  Goal: Pt/Family maintain appropriate behavior and adhere to behavioral management agreement, if implemented  Description: INTERVENTIONS:  1. Assess patient/family's coping skills and  non-compliant behavior (including use of illegal substances)  2. Notify security of behavior or suspected illegal substances which indicate the need for search of the family and/or belongings  3. Encourage verbalization of thoughts and concerns in a socially appropriate manner  4. Utilize positive, consistent limit setting strategies supporting safety of patient, staff and others  5. Encourage participation in the decision making process about the behavioral management agreement  6. If a visitor's behavior poses a threat to safety call refer to organization policy.   7. Initiate consult with , Psychosocial CNS, Spiritual Care as appropriate  Outcome: Progressing

## 2022-09-20 NOTE — DISCHARGE SUMMARY
Name:  Merlinda Cones  Room:  0212/0212-01  MRN:    [id number]    Discharge Summary      This discharge summary is in conjunction with a complete physical exam done on the day of discharge. Attending Physician: Dr. Glenis Lesches  Discharging Physician: Dr. Melissa Sands: 9/15/2022  Discharge:  9/20/2022    HPI:  The patient is a 59 y.o. male with PMH below, presented to ED by way of police. Apparently they were summoned for a wellness check. Pt reportedly recently made suicidal statements to family members. They also had not seen him in 3 weeks. He was reportedly found by police so weak that he could not stand and he had to be carried out to the car. He was reportedly covered in stool and urine. He has been combative w/ staff and has required chemical and physical restraints. He is cooperative and not willing to participate in history. He has Hx of schizophrenia per staff. He has required admission for psychosis. Diagnoses this Admission and Hospital Course   Acute kidney injury on chronic kidney disease stage IIIa  Likely prerenal and poor oral intake  Responding to IV fluids. BUN and creatinine coming down   Nephrology consulted  Given IV normal saline but pt very non cooperative and today he pulled out IV and does not want any fluids or labs to monitor   He feels fine and started walking in hallways      I tried to explain the importance of monitoring his electrolytes for treatment and associated heart issues and muscle weakness but pt refuses . I also mentioned that I cannot keep him here if he refuses labs   And he says he understands .    Seen by psych and they planned for inpatient transfer for his psychiatry issues  Will plan to convince him again for blood work and tx           Hyponatremia  Resolved with IV fluids- pt refusing labs further      Electrolyte disorder - Replaced magnesium, potassium and phosphorus as needed but pt refused     Acute metabolic encephalopathy  Resolved but mood remains labile Remains irritable and non cooperative         Schizoaffective disorder  Suicidal ideation  Seen and cleared by psych  Continued Haldol Effexor and trazodone  Plan for psychiatry adm        Heparin for DVT prophylaxis     Dc to I       Procedures (Please Review Full Report for Details)  N/A    Consults    Nephrology  Psychiatry       Physical Exam at Discharge:    /84   Pulse (!) 111   Temp 97.5 °F (36.4 °C) (Oral)   Resp 16   Ht 5' 6\" (1.676 m)   Wt 110 lb (49.9 kg)   SpO2 99%   BMI 17.75 kg/m²     See today's progress note    BMP:   Recent Labs     09/18/22  0542 09/19/22  0559    132*   K 3.1* 3.3*    100   CO2 26 24   PHOS 1.7* 1.3*   BUN 62* 34*   CREATININE 1.8* 1.6*       U/A:    Lab Results   Component Value Date/Time    NITRITE neg 02/09/2013 11:10 AM    COLORU Yellow 09/15/2022 08:59 PM    WBCUA 0-2 02/27/2020 09:05 PM    RBCUA None seen 02/27/2020 09:05 PM    MUCUS Rare 02/27/2020 09:05 PM    BACTERIA Rare 02/27/2020 09:05 PM    CLARITYU Clear 09/15/2022 08:59 PM    SPECGRAV 1.015 09/15/2022 08:59 PM    LEUKOCYTESUR Negative 09/15/2022 08:59 PM    BLOODU MODERATE 09/15/2022 08:59 PM    GLUCOSEU Negative 09/15/2022 08:59 PM    GLUCOSEU NEGATIVE 01/05/2012 05:05 PM    AMORPHOUS 4+ 01/08/2015 06:43 PM             RADIOLOGY  CT HEAD WO CONTRAST   Final Result   No acute intracranial abnormality.          XR CHEST PORTABLE   Final Result   No acute cardiopulmonary process               Discharge Medications     Medication List        START taking these medications      phosphorus [id number] MG tablet  Commonly known as: K PHOS NEUTRAL  Take 1 tablet by mouth 2 times daily for 5 days            CHANGE how you take these medications      venlafaxine 37.5 MG extended release capsule  Commonly known as: EFFEXOR XR  Take 1 capsule by mouth every morning (before breakfast)  What changed: when to take this            CONTINUE taking these medications      haloperidol 10 MG tablet  Commonly known as: HALDOL  Take 1 tablet by mouth at bedtime            ASK your doctor about these medications      * traZODone 50 MG tablet  Commonly known as: DESYREL  Take 1 tablet by mouth nightly for 30 days. Ask about: Which instructions should I use? * traZODone 150 MG tablet  Commonly known as: DESYREL  Take 1 tablet by mouth nightly  Ask about: Which instructions should I use? * This list has 2 medication(s) that are the same as other medications prescribed for you. Read the directions carefully, and ask your doctor or other care provider to review them with you. Where to Get Your Medications        These medications were sent to [id number] [place] [address], [address] [place] [address] [address] [address]      Phone: [phone]   haloperidol 10 MG tablet  phosphorus [id number] MG tablet  traZODone 150 MG tablet  venlafaxine 37.5 MG extended release capsule       You can get these medications from any pharmacy    Bring a paper prescription for each of these medications  traZODone 50 MG tablet           Discharged in stable condition to [facility].     Jean Lind MD, 10/19/2022 10:38 AM

## 2022-09-20 NOTE — BH NOTE
Phone call from [facility] who is the ACT  who has been working with [employer]. The ACT team has been attempting to see him daily. [employer] has not been taking his medications and has not been eating well or allowing the team to assist him with buying food. Katerinajames stated that the ACT team wants to coordinate and assist in finding him a suitable placement. Katerinajames stated that she or a member of her team would visit with [employer] each day he is hospitalized.

## 2022-09-20 NOTE — PROGRESS NOTES
Shift assessment complete. Patient alert and oriented. Patient refuses scheduled meds and vitals. Patient changed and repositioned for comfort. Bed in lowest position. Side rails up x2. Call light in reach.

## 2022-09-21 NOTE — H&P
Medical H&P completed 9/16. Assessment/Plan  Diagnoses this Admission and Hospital Course   Acute kidney injury on chronic kidney disease stage IIIa  Likely prerenal and poor oral intake  Responded to IV fluids. BUN and creatinine coming down   Nephrology consulted  given IV normal saline but pt very non cooperative and pulled out IV. He does not want any fluids or labs to monitor   He feels fine      I tried to explain the importance of monitoring his electrolytes for treatment and associated heart issues, muscle weakness, but pt refused. Seen by psych   Will dc to [facility]     Hyponatremia  Resolved with IV fluids     Electrolyte disorder - Replaced magnesium, potassium and phosphorus as needed but pt refused further labs. Acute metabolic encephalopathy  Resolved.  Remains irritable and non cooperative   Management per psychiatry     Schizoaffective disorder  Suicidal ideation  Seen by psychiatry  Continued Haldol Effexor and trazodone  Management per psychiatry    Ace Carlin [facility]  9/21/2022

## 2022-09-21 NOTE — PLAN OF CARE
Problem: Depression  Goal: Will be euthymic at discharge  Description: INTERVENTIONS:  1. Administer medication as ordered  2. Provide emotional support via 1:1 interaction with staff  3. Encourage involvement in milieu/groups/activities  4. Monitor for social isolation  Outcome: Progressing     Problem: Self Care Deficit  Goal: Return ADL status to a safe level of function  Description: INTERVENTIONS:  1. Administer medication as ordered  2. Assess ADL deficits and provide assistive devices as needed  3. Obtain PT/OT consults as needed  4. Assist and instruct patient to increase activity and self care as tolerated  Outcome: Progressing    Pt has been visible on the unit. He refused to talk to staff. When writer entered his room, he immediately told staff, \"no. \" He refused to talk to staff or answer admission questions. He appears very paranoid of staff. He refused his medications. He has been visible on the unit but remains isolative to self. He did accept snacks. He also refused VS this evening. Pt ambulated independently with a steady gait.  Denies needs currently

## 2022-09-21 NOTE — PLAN OF CARE
[facility]  Initial Interdisciplinary Treatment Plan NOTE    Review Date & Time: 9/21/22 0905    Patient was not in treatment team    Admission Type:   Admission Type: Voluntary    Reason for admission:  Reason for Admission: Depression and suicidal thoughts      Estimated Length of Stay Update:  3-5 days  Estimated Discharge Date Update: 9/24/22-9/26/22    EDUCATION:   Learner Progress Toward Treatment Goals: Reviewed results and recommendations of this team    Method: Individual    Outcome: Verbalized understanding    PATIENT GOALS: none expressed    PLAN/TREATMENT RECOMMENDATIONS UPDATE:continue treatment     GOALS UPDATE:   Time frame for Short-Term Goals: 2 days    Francisco Self RN

## 2022-09-21 NOTE — PROGRESS NOTES
`[facility]  Admission Note     Admission Type:   Admission Type: Voluntary    Reason for admission:  Reason for Admission: Depression and suicidal thoughts    PATIENT STRENGTHS:       Patient Strengths and Limitations:       Addictive Behavior:   Addictive Behavior  In the Past 3 Months, Have You Felt or Has Someone Told You That You Have a Problem With  : Other (comment) (MARKELL pt refused)    Medical Problems:   Past Medical History:   Diagnosis Date    Anxiety     Arthritis     Clostridium difficile diarrhea 01/10/2015    Depression     Difficulty urinating     DM (diabetes mellitus) ([facility] [address]     Hematoma     pt unsure where it was    Hx of blood clots     pt unsure where    Hyperlipidemia     Hypertension     Pneumonia     Psychosis ([facility] [address]     Schizoaffective disorder, bipolar type ([facility] [address]        Status EXAM:  Mental Status and Behavioral Exam  Normal: No  Level of Assistance: Independent/Self  Facial Expression: Avoids Gaze, Flat  Affect: Blunt  Level of Consciousness: Alert  Frequency of Checks: 4 times per hour, close  Mood:Normal: No  Mood: Suspicious  Motor Activity:Normal: Yes  Eye Contact: Poor  Observed Behavior: Guarded  Sexual Misconduct History: Current - no  Preception: Others (comment) (MARKELL pt refused)  Attention:Normal: No  Attention: Others (comment) (MARKELL pt refused)  Thought Processes: Blocking  Thought Content:Normal: No  Thought Content: Paranoia  Depression Symptoms: Isolative  Anxiety Symptoms: Generalized  Sheree Symptoms: No problems reported or observed.   Hallucinations: Unable to assess  Delusions: Yes  Delusions: Paranoid  Memory:Normal: No  Memory: Other (comment) (MARKELL pt refused)  Insight and Judgment: No  Insight and Judgment: Poor insight, Poor judgment    Tobacco Screening:  Practical Counseling, on admission, luis X, if applicable and completed (first 3 are required if patient doesn't refuse):            ( )  Recognizing danger situations (included triggers and roadblocks) ( )  Coping skills (new ways to manage stress, exercise, relaxation techniques, changing routine, distraction)                                                           ( )  Basic information about quitting (benefits of quitting, techniques in how to quit, available resources  ( ) Referral for counseling faxed to [facility]                                           ( ) Patient refused counseling  ( ) Patient has not smoked in the last 30 days    Metabolic Screening:    Lab Results   Component Value Date    LABA1C 5.6 02/29/2020       No results for input(s): CHOL, TRIG, HDL, LDLCALC, LABVLDL in the last 72 hours. There is no height or weight on file to calculate BMI. BP Readings from Last 2 Encounters:   09/20/22 106/79   09/20/22 131/84           Pt admitted with followings belongings:  Dental Appliances: None  Vision - Corrective Lenses: None  Hearing Aid: None  Jewelry: None  Body Piercings Removed: No  Other Valuables: Other (Comment) (none)     Valuables sent home with n/a. Valuables placed in safe in security envelope, number:  n/a. Patient's home medications were ordered. Patient oriented to surroundings and program expectations and copy of patient rights given. Received admission packet:  yes. Consents reviewed, signed no. Refused yes.  Patient verbalize understanding:  no.    Patient education on precautions: no pt refused                   Liz Dominguez RN

## 2022-09-21 NOTE — FLOWSHEET NOTE
Purposeful Rounding    Patient concerns reported: PT is sleeping in bed.  No concerns at this moment      Nurse made aware:NA     Patient Turned and repositioned: Independent     Patient Toileted: Continent and Independent     Fall Precautions in Place: Yellow non-skid socks on, Bed locked in low position, Lighting appropriate, Room free of clutter, and Clear path to bathroom       Electronically signed by Haley Dickey on 9/21/22 at 3:27 AM EDT

## 2022-09-21 NOTE — PROGRESS NOTES
Pt refusing physical assessment and 4eyes at this time. Pt given a mask to wear on the unit. Staff will continue to wear a mask while providing pt care. Patient is able to demonstrate the ability to move from a reclining position to an upright position within the recliner.

## 2022-09-21 NOTE — FLOWSHEET NOTE
Senior Purposeful Rounding    Position:Repositions Self    Physical Environment:Room free from clutter, Clear path to bathroom , Adequate lighting, and No safety hazards noted    Pain Rating/ Nonverbal Pain Behaviors:0;      Pain interventions Attempted: n/a    Patient Toileted:Independent

## 2022-09-21 NOTE — PROGRESS NOTES
Patient refused morning medication and is not eating much. Patient withdrawn and flat. Patient states that he wants to leave. Patient independent and continent. Patient able to reposition self in bed. Patient has a clear pathway to his bathroom and room remains clear from debris.

## 2022-09-21 NOTE — PROGRESS NOTES
Comprehensive Nutrition Assessment    Type and Reason for Visit:  Reassess    Nutrition Recommendations/Plan:   Continue ADULT DIET; Regular; 60-80 g protein per day. Continue Glucerna ONS with meals. Monitor appetite, meal intake, and acceptance/intake of ONS. Please obtain an updated weight for this patient + document weight method - last weight was obtained on 9/18/22. Monitor mental health status, bowel function, and weight trends. Malnutrition Assessment:  Malnutrition Status:  Severe malnutrition (09/21/22 1257)    Context:  Chronic Illness     Findings of the 6 clinical characteristics of malnutrition:  Energy Intake:  75% or less estimated energy requirements for 1 month or longer  Weight Loss:  Unable to assess     Body Fat Loss:  Severe body fat loss Orbital, Buccal region   Muscle Mass Loss:  Severe muscle mass loss Temples (temporalis), Clavicles (pectoralis & deltoids)  Fluid Accumulation:  No significant fluid accumulation     Strength:  Not Performed    Nutrition Assessment:    patient remains unchanged from a nutritional standpoint since last RD assessment; he remains at risk for further compromise d/t < 50% of most meals consumed on 2W and [facility] unit, mental health compromise, and severe malnutrition diagnosis; will continue ADULT DIET;  Regular; 60-80 g protein per day and Glucerna with meals    Nutrition Related Findings:    patient is A & O x 4; he was apparently mad that he was moved to [facility] and has not been cooperative with staff during screenings and assessments since arriving on [facility]; he is withdrawn and has a flat affect; he wants to leave and go home; he is not consuming much po intake - he consumed 1-25% of his breakfast this am; + BM on 9/19/22 Wound Type: None       Current Nutrition Intake & Therapies:    Average Meal Intake: 1-25%  Average Supplements Intake: Unable to assess  ADULT ORAL NUTRITION SUPPLEMENT; Breakfast, Lunch, Dinner; Diabetic Oral Supplement  ADULT DIET; Regular; 60 to 80 gm    Anthropometric Measures:  Height: 5' 6\" (167.6 cm)  Ideal Body Weight (IBW): 142 lbs (65 kg)    Admission Body Weight: 107 lb 12.9 oz (48.9 kg) (obtained on 9/16/22; actual weight)  Current Body Weight: 110 lb (49.9 kg) (obtained on 9/18/22; weight via bed scale), 77.5 % IBW. Weight Source: Bed Scale  Current BMI (kg/m2): 17.8  Usual Body Weight: 107 lb 12.9 oz (48.9 kg) (obtained on 9/16/22; actual weight)  % Weight Change (Calculated):  2  Weight Adjustment For: No Adjustment                 BMI Categories: Underweight (BMI less than 18.5)    Estimated Daily Nutrient Needs:  Energy Requirements Based On: Kcal/kg  Weight Used for Energy Requirements: Current  Energy (kcal/day): 1650 - 1750 kcals based on 33-35 kcals/kg/CBW  Weight Used for Protein Requirements: Current  Protein (g/day): 75 - 85 g protein based on 1.5-1.7 g/kg/CBW  Method Used for Fluid Requirements: 1 ml/kcal  Fluid (ml/day): 1650 - 1750 ml    Nutrition Diagnosis:     related to   as evidenced by      Nutrition Interventions:   Food and/or Nutrient Delivery: Continue Current Diet, Continue Oral Nutrition Supplement  Nutrition Education/Counseling: No recommendation at this time  Coordination of Nutrition Care: Continue to monitor while inpatient, Coordination of Care       Goals:  Previous Goal Met: No Progress toward Goal(s)          Nutrition Monitoring and Evaluation:   Behavioral-Environmental Outcomes: None Identified  Food/Nutrient Intake Outcomes: Food and Nutrient Intake, Supplement Intake  Physical Signs/Symptoms Outcomes: Meal Time Behavior, Nutrition Focused Physical Findings, Skin, Weight    Discharge Planning:    Continue current diet, Continue Oral Nutrition Supplement     Jammie Easley, [address] [place] [address],   Contact: [phone]
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
## 2022-09-21 NOTE — FLOWSHEET NOTE
Purposeful Rounding    Patient concerns reported: PT is sleeping in bed.  No concerns at this moment      Nurse made aware:NA     Patient Turned and repositioned: Independent     Patient Toileted: Continent and Independent     Fall Precautions in Place: Yellow non-skid socks on, Bed locked in low position, Lighting appropriate, Room free of clutter, and Clear path to bathroom       Electronically signed by Jared Knight on 9/21/22 at 5:07 AM EDT

## 2022-09-21 NOTE — PROGRESS NOTES
Behavioral Services  Medicare Certification Upon Admission    I certify that this patient's inpatient psychiatric hospital admission is medically necessary for:    [x] (1) Treatment which could reasonably be expected to improve this patient's condition,       [x] (2) Or for diagnostic study;     AND     [x](2) The inpatient psychiatric services are provided while the individual is under the care of a physician and are included in the individualized plan of care.     Estimated length of stay/service 2-5 days    Plan for post-hospital care outpatient services    Electronically signed by MOIZ Bishop CNP on 9/21/2022 at 1:22 PM

## 2022-09-21 NOTE — FLOWSHEET NOTE
Purposeful Rounding      Patient concerns reported: PT is resting in bed.  No concerns at this moment      Nurse made aware:NA     Patient Turned and repositioned: Independent     Patient Toileted: Continent and Independent     Fall Precautions in Place: Yellow non-skid socks on, Bed locked in low position, Lighting appropriate, Room free of clutter, and Clear path to bathroom       Electronically signed by Seble Arvizu on 9/21/22 at 3:26 AM EDT

## 2022-09-21 NOTE — FLOWSHEET NOTE
Purposeful Rounding    Patient concerns reported: PT is resting bed.  No concerns at this moment     Nurse made aware:NA    Patient Turned and repositioned: Independent    Patient Toileted: Continent and Independent    Fall Precautions in Place: Yellow non-skid socks on, Bed locked in low position, Lighting appropriate, Room free of clutter, and Clear path to bathroom      Electronically signed by Kiersten Brody on 9/20/22 at 10:32 PM EDT

## 2022-09-21 NOTE — GROUP NOTE
Group Therapy Note    Date: 9/21/2022    Group Start Time: 2718  Group End Time: 1400  Group Topic: Recreational    [id number]        Group Therapy Note    Attendees: 9    Group members were given dessert and attended a \"Birthday Party\" for one of the patients on the unit. Notes:  Fransico Carson attended group for the full duration. Fransico Carson declined the dessert and sat quietly at the table. Status After Intervention:  Improved    Participation Level:  Active Listener    Participation Quality: Appropriate      Speech:  hesitant      Thought Process/Content: Linear      Affective Functioning: Congruent      Mood: euthymic      Level of consciousness:  Alert      Response to Learning: Able to verbalize current knowledge/experience      Endings: None Reported    Modes of Intervention: Socialization      Discipline Responsible: Behavorial Health Tech      Signature:  GLADYS Chavez

## 2022-09-22 NOTE — PROGRESS NOTES
Senior Purposeful Rounding    Position:Left Side and Repositions Self    Physical Environment:Room free from clutter, Clear path to bathroom , Adequate lighting, and No safety hazards noted    Pain Rating/ Nonverbal Pain Behaviors:0    Pain interventions Attempted: None    Patient Toileted:Continent and Independent

## 2022-09-22 NOTE — PROGRESS NOTES
Senior Purposeful Rounding    Position:Back and Repositions Self    Physical Environment:Room free from clutter, Clear path to bathroom , Adequate lighting, Bed alarm functioning, and No safety hazards noted    Pain Rating/ Nonverbal Pain Behaviors:0    Pain interventions Attempted: None    Patient Toileted:Continent and Independent

## 2022-09-22 NOTE — PROGRESS NOTES
Senior Purposeful Rounding    Position:Back and Repositions Self    Physical Environment:Room free from clutter, Clear path to bathroom , Adequate lighting, and No safety hazards noted    Pain Rating/ Nonverbal Pain Behaviors:0    Pain interventions Attempted: None    Patient Toileted:Continent and Independent Dr Marina

## 2022-09-22 NOTE — PROGRESS NOTES
Senior Purposeful Rounding    Position:Back and Repositions Self    Physical Environment:Room free from clutter, Clear path to bathroom , Adequate lighting, and No safety hazards noted    Pain Rating/ Nonverbal Pain Behaviors:0    Pain interventions Attempted: None    Patient Toileted:Continent and Independent

## 2022-09-22 NOTE — PROGRESS NOTES
Senior Purposeful Rounding    Position:Back and Repositions Self    Physical Environment:Room free from clutter, Clear path to bathroom , Adequate lighting, and No safety hazards noted    Pain Rating/ Nonverbal Pain Behaviors:denies    Pain interventions Attempted: None    Patient Toileted:Continent and Independent

## 2022-09-22 NOTE — PLAN OF CARE
Problem: Safety - Violent/Self-destructive Restraint  Goal: Remains free of injury from restraints (Restraint for Violent/Self-Destructive Behavior)  Description: INTERVENTIONS:  1. Determine that de-escalation and other, less restrictive measures have been tried or would not be effective before applying the restraint  2. Identify and document the criteria for restraint  3. Evaluate the patient's condition at the time of restraint application  4. Inform patient/family regarding the reason for restraint/seclusion  5. Q2H: Monitor comfort, nutrition and hydration needs  6. Q15M: Perform safety checks including skin, circulation, sensory, respiratory and psychological status  7. Ensure continuous observation  8. Identify and implement measures to help patient regain control, assess readiness for release and initiate progressive release per policy  Outcome: Progressing     Problem: Depression  Goal: Will be euthymic at discharge  Description: INTERVENTIONS:  1. Administer medication as ordered  2. Provide emotional support via 1:1 interaction with staff  3. Encourage involvement in milieu/groups/activities  4. Monitor for social isolation  Outcome: Not Progressing     Problem: Behavior  Goal: Pt/Family maintain appropriate behavior and adhere to behavioral management agreement, if implemented  Description: INTERVENTIONS:  1. Assess patient/family's coping skills and  non-compliant behavior (including use of illegal substances)  2. Notify security of behavior or suspected illegal substances which indicate the need for search of the family and/or belongings  3. Encourage verbalization of thoughts and concerns in a socially appropriate manner  4. Utilize positive, consistent limit setting strategies supporting safety of patient, staff and others  5. Encourage participation in the decision making process about the behavioral management agreement  6.  If a visitor's behavior poses a threat to safety call refer to organization policy. 7. Initiate consult with , Psychosocial CNS, Spiritual Care as appropriate  Outcome: Not Progressing     Problem: Anxiety  Goal: Will report anxiety at manageable levels  Description: INTERVENTIONS:  1. Administer medication as ordered  2. Teach and rehearse alternative coping skills  3. Provide emotional support with 1:1 interaction with staff  Outcome: Not Progressing     Problem: Self Care Deficit  Goal: Return ADL status to a safe level of function  Description: INTERVENTIONS:  1. Administer medication as ordered  2. Assess ADL deficits and provide assistive devices as needed  3. Obtain PT/OT consults as needed  4. Assist and instruct patient to increase activity and self care as tolerated  Outcome: Not Progressing     Problem: Nutrition Deficit:  Goal: Optimize nutritional status  Outcome: Not Progressing  Flowsheets (Taken 9/21/2022 1250 by Rosmery Will RD, LD)  Nutrient intake appropriate for improving, restoring, or maintaining nutritional needs:   Assess nutritional status and recommend course of action   Monitor oral intake, labs, and treatment plans   Recommend appropriate diets, oral nutritional supplements, and vitamin/mineral supplements     Problem: Depression  Goal: Will be euthymic at discharge  Description: INTERVENTIONS:  1. Administer medication as ordered  2. Provide emotional support via 1:1 interaction with staff  3. Encourage involvement in milieu/groups/activities  4. Monitor for social isolation  Outcome: Not Progressing     Problem: Behavior  Goal: Pt/Family maintain appropriate behavior and adhere to behavioral management agreement, if implemented  Description: INTERVENTIONS:  1. Assess patient/family's coping skills and  non-compliant behavior (including use of illegal substances)  2. Notify security of behavior or suspected illegal substances which indicate the need for search of the family and/or belongings  3.  Encourage verbalization of thoughts and concerns in a socially appropriate manner  4. Utilize positive, consistent limit setting strategies supporting safety of patient, staff and others  5. Encourage participation in the decision making process about the behavioral management agreement  6. If a visitor's behavior poses a threat to safety call refer to organization policy. 7. Initiate consult with , Psychosocial CNS, Spiritual Care as appropriate  Outcome: Not Progressing     Problem: Anxiety  Goal: Will report anxiety at manageable levels  Description: INTERVENTIONS:  1. Administer medication as ordered  2. Teach and rehearse alternative coping skills  3. Provide emotional support with 1:1 interaction with staff  Outcome: Not Progressing     Problem: Self Care Deficit  Goal: Return ADL status to a safe level of function  Description: INTERVENTIONS:  1. Administer medication as ordered  2. Assess ADL deficits and provide assistive devices as needed  3. Obtain PT/OT consults as needed  4. Assist and instruct patient to increase activity and self care as tolerated  Outcome: Not Progressing     Problem: Nutrition Deficit:  Goal: Optimize nutritional status  Outcome: Not Progressing  Flowsheets (Taken 9/21/2022 1250 by Lacho Sher, RD, LD)  Nutrient intake appropriate for improving, restoring, or maintaining nutritional needs:   Assess nutritional status and recommend course of action   Monitor oral intake, labs, and treatment plans   Recommend appropriate diets, oral nutritional supplements, and vitamin/mineral supplements    Pt withdrawn to room this evening. Refuses vital signs, assessment, medications, or snacks. Quiet with refusal to converse with caregivers. No interaction with peers. Withdrawn to room throughout evening. Rests with eyes closed.

## 2022-09-22 NOTE — PLAN OF CARE
Problem: Chronic Conditions and Co-morbidities  Goal: Patient's chronic conditions and co-morbidity symptoms are monitored and maintained or improved  Outcome: Progressing     Problem: Anxiety  Goal: Will report anxiety at manageable levels  Description: INTERVENTIONS:  1. Administer medication as ordered  2. Teach and rehearse alternative coping skills  3. Provide emotional support with 1:1 interaction with staff  Outcome: Progressing       Pt. Isolative to room. Pt. Encouraged this morning to take his med but refused. [facility] management met with him today and he complied to take his 1 medication. MD originally planned to d/c pt after talking to him, [facility] made MD aware that he did take med BUT, did not eat and was not fully telling the truth to MD. She went back to talk to him and told pt, if he could comply with medication/food tonight and in the morning he could be released tomorrow. Pt. Stated he will go home, take his medication and eat and knows if he does not do this he will be returning here. Pt. Did agree to drink some mt. Dew which he did drink one bottle. Pt. Also drank 1 Glucerna in front of nursing staff. Pt. Told writer that his d/c was comprised because of writer. Writer informed him that it was not the choice of mine, but solely up to MD.   Hopeful D/C tomorrow.

## 2022-09-22 NOTE — FLOWSHEET NOTE
Purposeful Rounding    Patient concerns reported:No complaints/concerns noted patient resting at this time.  Will continue to monitor    Nurse made aware:N/A    Patient Turned and repositioned: Independent    Patient Toileted: Continent    Fall Precautions in Place: Yellow non-skid socks on, Bed locked in low position, Lighting appropriate, Room free of clutter, and Clear path to bathroom      Electronically signed by Lennie Tierney on 9/21/22 at 11:14 PM EDT

## 2022-09-22 NOTE — PROGRESS NOTES
normal range, [x] constricted, [] depressed, [] anxious,     [] angry, [] blunted, [] flat  Hallucinations  [x] denies, [] auditory,  [] visual,  [] olfactory, [] tactile  Delusions  [x] denies, [] grandiose,  [] jealous,  [] persecutory,  [] somatic,     [] bizarre  Preoccupations  [] none, [] violence, [] obsessions, [] other     Suicidal ideation  [x] denies, [] endorses  Homicidal ideation [x] denies, [] endorses  Thought process: [x] logical, [] circumstantial, [] tangential     [] concrete, [] disorganized  Associations:  [x] logical and coherent, [] loosening, [] disorganized  Insight:   [] good, [x] fair, [] poor  Judgment:  [] good, [x] fair, [] poor  Attention and concentration:     [x] intact, [] limited, [] able to focus on interview,     [] grossly impaired  Orientation:  [x] person, place, time, situation     [] disoriented to:     Memory:  Remote memory [x] intact, [] impaired     Recent memory  [x] intact, [] impaired          Data  Labs:   Admission on 09/20/2022   Component Date Value Ref Range Status    TSH 09/19/2022 1.15  0.27 - 4.20 uIU/mL Final            Medications  Current Facility-Administered Medications: phosphorus (K PHOS NEUTRAL) tablet 2 tablet, 500 mg, Oral, BID  venlafaxine (EFFEXOR XR) extended release capsule 37.5 mg, 37.5 mg, Oral, Daily with breakfast  traZODone (DESYREL) tablet 150 mg, 150 mg, Oral, Nightly  haloperidol (HALDOL) tablet 10 mg, 10 mg, Oral, Nightly  nicotine (NICODERM CQ) 14 MG/24HR 1 patch, 1 patch, TransDERmal, Daily  polyethylene glycol (GLYCOLAX) packet 17 g, 17 g, Oral, Daily PRN  acetaminophen (TYLENOL) tablet 650 mg, 650 mg, Oral, Q4H PRN  magnesium hydroxide (MILK OF MAGNESIA) 400 MG/5ML suspension 30 mL, 30 mL, Oral, Daily PRN  nicotine polacrilex (COMMIT) lozenge 2 mg, 2 mg, Oral, Q1H PRN  aluminum & magnesium hydroxide-simethicone (MAALOX) 200-200-20 MG/5ML suspension 30 mL, 30 mL, Oral, Q6H PRN  hydrOXYzine HCl (ATARAX) tablet 50 mg, 50 mg, Oral, TID PRN  OLANZapine (ZYPREXA) tablet 5 mg, 5 mg, Oral, Q4H PRN **OR** OLANZapine (ZYPREXA) 5 mg in sterile water 1 mL injection, 5 mg, IntraMUSCular, Q4H PRN  diphenhydrAMINE (BENADRYL) injection 50 mg, 50 mg, IntraMUSCular, Q4H PRN  melatonin tablet 3 mg, 3 mg, Oral, Nightly PRN    ASSESSMENT AND PLAN    Principal Problem:    Schizoaffective disorder, bipolar type ([facility])  Active Problems:    Major depressive disorder, recurrent ([facility] [address]  Resolved Problems:    * No resolved hospital problems. Shady Perez is calm and was able to sleep last night. We have expressed to him that we would be more comfortable with how he is doing if he took his meds and ate some food. Margi Carter very much wants to go home. He is not suicidal or homicidal.  He feels better now that his  has talked to him. Margi Carter agrees to eat and take his meds      1. Patient's symptoms are improving. He is now talking to staff and is no longer irritable  2. Probable discharge is tomorrow if he is able to eat  3. Discharge planning is incomplete  4. Suicidal ideation is no longer present      Total time of this patient's care was 40 minutes and more than 50 % of that time was spent coordinating care with members of the treatment team, plus counseling .

## 2022-09-22 NOTE — BH NOTE
Assumed care at 1500. Pt remains withdrawn to room. Reluctantly drank 100% of Glucerna supplement at dinner time. Pt c/o nausea, refuses tray and offered snacks.

## 2022-09-23 PROBLEM — E83.42 HYPOMAGNESEMIA: Status: ACTIVE | Noted: 2022-01-01

## 2022-09-23 NOTE — PLAN OF CARE
Problem: Safety - Violent/Self-destructive Restraint  Goal: Remains free of injury from restraints (Restraint for Violent/Self-Destructive Behavior)  Description: INTERVENTIONS:  1. Determine that de-escalation and other, less restrictive measures have been tried or would not be effective before applying the restraint  2. Identify and document the criteria for restraint  3. Evaluate the patient's condition at the time of restraint application  4. Inform patient/family regarding the reason for restraint/seclusion  5. Q2H: Monitor comfort, nutrition and hydration needs  6. Q15M: Perform safety checks including skin, circulation, sensory, respiratory and psychological status  7. Ensure continuous observation  8. Identify and implement measures to help patient regain control, assess readiness for release and initiate progressive release per policy  Outcome: Progressing     Problem: Chronic Conditions and Co-morbidities  Goal: Patient's chronic conditions and co-morbidity symptoms are monitored and maintained or improved  9/22/2022 2110 by Jody Coughlin RN  Outcome: Progressing     Problem: Depression  Goal: Will be euthymic at discharge  Description: INTERVENTIONS:  1. Administer medication as ordered  2. Provide emotional support via 1:1 interaction with staff  3. Encourage involvement in milieu/groups/activities  4. Monitor for social isolation  Outcome: Progressing     Problem: Behavior  Goal: Pt/Family maintain appropriate behavior and adhere to behavioral management agreement, if implemented  Description: INTERVENTIONS:  1. Assess patient/family's coping skills and  non-compliant behavior (including use of illegal substances)  2. Notify security of behavior or suspected illegal substances which indicate the need for search of the family and/or belongings  3. Encourage verbalization of thoughts and concerns in a socially appropriate manner  4.  Utilize positive, consistent limit setting strategies supporting safety of patient, staff and others  5. Encourage participation in the decision making process about the behavioral management agreement  6. If a visitor's behavior poses a threat to safety call refer to organization policy. 7. Initiate consult with , Psychosocial CNS, Spiritual Care as appropriate  Outcome: Progressing     Problem: Anxiety  Goal: Will report anxiety at manageable levels  Description: INTERVENTIONS:  1. Administer medication as ordered  2. Teach and rehearse alternative coping skills  3. Provide emotional support with 1:1 interaction with staff  9/22/2022 2110 by Jody Coughlin RN  Outcome: Progressing     Problem: Self Care Deficit  Goal: Return ADL status to a safe level of function  Description: INTERVENTIONS:  1. Administer medication as ordered  2. Assess ADL deficits and provide assistive devices as needed  3. Obtain PT/OT consults as needed  4. Assist and instruct patient to increase activity and self care as tolerated  Outcome: Progressing     Problem: Nutrition Deficit:  Goal: Optimize nutritional status  Outcome: Progressing    Patient remains withdrawn to room. No interaction with peers observed. More cooperative with care. Denies AH/VH, SI/HI. No intake of a HS snack despite encouragement. Continent of bowel and bladder. Medication compliant.

## 2022-09-23 NOTE — PROGRESS NOTES
Writer went in to give his meds this AM and patient was complaining of feeling restless and sick to his stomach. Pt. Stated he is not sleeping and is tired of the thoughts in his running. Pt. Denies all, but when asked him if he wanted to die he blankly looked at Juevnalifm Rehana and just stared without saying in words. Writer than asked pt. If he wanted to live and he stated \"I don't know if I want to do that either\" pt. Asking if he doesn't eat or drink today if he will be able to go home or not. Writer informed it is strongly recommended that he eat and drink, which will more than likely make him feel better. Pt. Currently refusing to take his AM medication, effexor at this time. Also refusing to eat or drink at this time. Pt's sister Emily Wilkinson called asking about pt. Writer asked pt if she had permission to talk to her and he gave permission. Emily Wilkinson asked about how he was doing and informed nurse that he is a liar and has been wanting to die for years. While at home, he also will not take his medication or eat d/t wanting to die. She also stated his living condition is also very poor and does not take care of himself the way he should. Sister asking for a call back for the plan for him later.

## 2022-09-23 NOTE — PLAN OF CARE
[address] [facility]  Day 3 Interdisciplinary Treatment Plan NOTE    Review Date & Time: 09/23/22 11:03 AM      Patient was not in treatment team    Estimated Length of Stay Update:  4-5 days  Estimated Discharge Date Update: 9/25/22    EDUCATION:   Learner Progress Toward Treatment Goals: Reviewed results and recommendations of this team, Reviewed group plan and strategies, Reviewed signs, symptoms and risk of self harm and violent behavior, and Reviewed goals and plan of care    Method: Individual    Outcome: Verbalized understanding and Needs reinforcement    PATIENT GOALS: to be discharged    PLAN/TREATMENT RECOMMENDATIONS UPDATE: Patient will take medication as prescribed, eat 50% of meals and/or 100% nutritional supplement, attend groups, participate in milieu activities, participate in treatment team and care planning for discharge and follow up.      GOALS UPDATE:   Time frame for Short-Term Goals: 1-2 days      Denae Montelongo RN

## 2022-09-23 NOTE — PROGRESS NOTES
Brief Progress Note    Date: 09/23/22    Subjective: nursing reports patient is constipated. He has been refusing to eat or drink anythign due to nausea. He is unsure when his last BM was. Objective:  Vitals:    09/20/22 1430 09/21/22 1250 09/22/22 2007   BP: 106/79  94/79   Pulse: 85  99   Resp: 18  16   Temp: 97.8 °F (36.6 °C)  98.6 °F (37 °C)   TempSrc: Temporal  Oral   SpO2: 95%  100%   Height:  5' 6\" (1.676 m)        Physical Exam:    Gen: No distress. Alert. Eyes: PERRL. No sclera icterus. No conjunctival injection. ENT: No discharge. Pharynx clear. Neck: No JVD  GI: Non-tender. Non-distended. Normal bowel sounds. Neuro: Awake. Grossly nonfocal    Psych: Per psychiatry team        Assessment & Plan:    #Constipation  #Nausea  - check KUB  - PRN Zofran, pt has been refusing this  - PRN medication ordered, pt has been refusing them   - encourage fluids    #chronic kidney disease stage IIIa  - was admitted and given IVF, pt pulled out IV and refused further treatment. He was then transferred to psych for further treatment.   - recheck BMP today, pt with poor po intake     #HX of Hyponatremia  - during recent admission which resolved with IVF     #Electrolyte disorder  - during recent admission, he refused further labs prior to transfer to psych   - recheck BMP today      #Schizoaffective disorder  #Suicidal ideation  - Management per psychiatry      MOIZ Peng - CNP   2:01 PM 9/23/2022

## 2022-09-23 NOTE — PROGRESS NOTES
Comprehensive Nutrition Assessment    Type and Reason for Visit:  Reassess    Nutrition Recommendations/Plan:   Continue ADULT DIET; Regular; 60-80 g protein per day diet order - please document meal intake % or refusal of meals in flow sheets. Continue Glucerna with meals - please document ONS intake % or refusal in flow sheets. Monitor appetite, meal intake, and acceptance/intake of ONS. Please obtain an updated weight for this patient - last weight was obtained on 9/18/22. Monitor nutrition-related labs, bowel function, and weight trends. Malnutrition Assessment:  Malnutrition Status:  Severe malnutrition (09/23/22 1625)    Context:  Chronic Illness     Findings of the 6 clinical characteristics of malnutrition:  Energy Intake:  75% or less estimated energy requirements for 1 month or longer  Weight Loss:  Unable to assess     Body Fat Loss:  Severe body fat loss Orbital, Triceps, Buccal region   Muscle Mass Loss:  Severe muscle mass loss Temples (temporalis), Clavicles (pectoralis & deltoids), Thigh (quadraceps), Calf (gastrocnemius), Hand (interosseous), Scapula (trapezius)  Fluid Accumulation:  No significant fluid accumulation     Strength:  Not Performed    Nutrition Assessment:    patient has declined from a nutritional standpoint since last RD assessment AEB patient is not consuming meals at all at this time and he remains at risk for further compromise d/t severe malnutrition, mental status compromise, and patient refuses to consume meals because he reported that \"everything comes back up\"; will continue ADULT DIET;  Regular; 60-80 g protein diet order and continue Glucerna with meals    Nutrition Related Findings:    patient is A & O x 4; he was willing to speak with this RD this afternoon but he did not offer much information during visit; patient appeared anxious and reported multiple times that he needs to get home but could elaborate on what was at home that he needed to get home to; patient is very thin and cachectic; he had on a red shirt and black pants + socks today; + poor dentition; patient stated that he cannot consume solid food because \"it comes back up\" and the same thing happens with fluids at times; patient consumed a [employer]. Dew (in a cup) + a Glucerna ONS drink on 9/22/22; he wanted more Mt. Dew today but would not eat/drink anything else; he can ambulate on the unit but he is very frail Wound Type: None       Current Nutrition Intake & Therapies:    Average Meal Intake: 1-25%  Average Supplements Intake: Unable to assess  ADULT ORAL NUTRITION SUPPLEMENT; Breakfast, Lunch, Dinner; Diabetic Oral Supplement  ADULT DIET; Regular; 60 to 80 gm    Anthropometric Measures:  Height: 5' 6\" (167.6 cm)  Ideal Body Weight (IBW): 142 lbs (65 kg)    Admission Body Weight: 107 lb 12.9 oz (48.9 kg) (obtained on 9/16/22; actual weight)  Current Body Weight: 110 lb (49.9 kg) (obtained on 9/18/22; weight via bed scale), 77.5 % IBW. Weight Source: Bed Scale  Current BMI (kg/m2): 17.8  Usual Body Weight: 107 lb 12.9 oz (48.9 kg) (obtained on 9/16/22; actual weight)  % Weight Change (Calculated):  2  Weight Adjustment For: No Adjustment                 BMI Categories: Underweight (BMI less than 18.5)    Estimated Daily Nutrient Needs:  Energy Requirements Based On: Kcal/kg  Weight Used for Energy Requirements: Current  Energy (kcal/day): 1650 - 1750 kcals based on 33-35 kcals/kg/CBW  Weight Used for Protein Requirements: Current  Protein (g/day): 75 - 85 g protein based on 1.5-1.7 g/kg/CBW  Method Used for Fluid Requirements: 1 ml/kcal  Fluid (ml/day): 1650 - 1750 ml    Nutrition Diagnosis:   Severe malnutrition related to inadequate protein-energy intake, psychological cause or life stress, lack or limited access to food as evidenced by poor intake prior to admission, intake 0-25%, severe loss of subcutaneous fat, severe muscle loss, BMI    Nutrition Interventions:   Food and/or Nutrient Delivery: Continue Current Diet, Continue Oral Nutrition Supplement  Nutrition Education/Counseling: No recommendation at this time  Coordination of Nutrition Care: Continue to monitor while inpatient, Coordination of Care  Plan of Care discussed with: patient's RN    Goals:  Previous Goal Met: Progress towards Goal(s) Declining          Nutrition Monitoring and Evaluation:   Behavioral-Environmental Outcomes: None Identified  Food/Nutrient Intake Outcomes: Food and Nutrient Intake, Supplement Intake  Physical Signs/Symptoms Outcomes: Meal Time Behavior, Nutrition Focused Physical Findings, Skin, Weight    Discharge Planning:    Continue current diet, Continue Oral Nutrition Supplement     Caryn Rodriguez, [address] [place] [address], LD  Contact: [phone]

## 2022-09-23 NOTE — PROGRESS NOTES
Patient alert and rests in bed. Withdrawn to room. Allows vital signs to be completed. Denies pain. Denies AH/VH, SI/HI. States is somewhat depressed. Encouraged a selection of snacks without success. Given [address] per patient's request.  Increased cooperation with caregivers observed. No interaction with peers noted.

## 2022-09-23 NOTE — PROGRESS NOTES
Senior Purposeful Rounding    Position:Right Side and Repositions Self    Physical Environment:Room free from clutter, Clear path to bathroom , Adequate lighting, and No safety hazards noted    Pain Rating/ Nonverbal Pain Behaviors:0    Pain interventions Attempted: None    Patient Toileted:Continent and Independent

## 2022-09-23 NOTE — PROGRESS NOTES
Attempts made to do vital signs. Patient refused. Remains withdrawn to room. L Throckmorton RN attempted to start an IV. Patient refused. Encouragement of any care has been unsuccessful thus far.

## 2022-09-23 NOTE — PROGRESS NOTES
Senior Purposeful Rounding    Position:Right Side and Repositions Self    Physical Environment:Room free from clutter, Clear path to bathroom , Adequate lighting, and No safety hazards noted    Pain Rating/ Nonverbal Pain Behaviors:0    Pain interventions Attempted: none    Patient Toileted:No- Void

## 2022-09-23 NOTE — BH NOTE
Left message on secure vm for Aaronyakov Sanderson, pt's Fazaltjacqueline CM providing an update on his progress/treatment plan.

## 2022-09-23 NOTE — PROGRESS NOTES
Department of Psychiatry  AttendingProgress Note    Complains of decreased appetite, nausea. Says he does not want to eat because he is worried that he won't be able to keep it down. He has also refused meds. He has drunk some PingThings on occasion, but is generally resistant to drinking fluids. Labs were drawn. Agrees that he is depressed, but says that his physical symptoms are his main concern right now. The treatment team met and discussed the treatment plan. SUBJECTIVE:        Suicidal ideation:  denies suicidal ideation. Patient does not have medication side effects. --Not taking many meds  ROS: Patient has new complaints: yes - Nausea  Sleeping adequately:  No:    Appetite adequate: No:   Attending groups: No:       OBJECTIVE    Physical  Vitals:    Blood pressure 94/79, pulse 99, temperature 98.6 °F (37 °C), temperature source Oral, resp. rate 16, height 5' 6\" (1.676 m), SpO2 100 %.   General appearance:  []  appears age, [x]  appears older than stated age,     [x]  adequately dressed and groomed, [x] cachectic,                [x]  in no acute distress, [] appears mildly distressed,      []  Other:      MUSCULOSKELETAL:   Gait:   [x] normal, [] antalgic, [] unsteady, [] in bed, gait not evaluated   Station:  [] erect, [x] sitting, [] recumbent, [] other        Strength/tone:  [x] muscle strength and tone appear consistent with age and condition     [] atrophy      [] abnormal movements  PSYCHIATRIC:    Relatedness:  [] cooperative, [] guarded, [x] indifferent, [] hostile,      [] sedated  Speech:  [x] normal prosody, [] pressured, [] decreased volume,    [] slurred, [] halting, [] slowed, [] loud     [] echolalia, [] incoherent, [] stuttering   Eye contact:  [x] direct, [] avoidant, [] intense  Kinetics:  [x] normal, [] increased, [] decreased  Mood:   [] euthymic, [x] depressed, [] anxious, [] irritable,     [] labile  Affect:   [] normal range, [] constricted, [x] depressed, [] anxious,     [] angry, [] blunted, [] flat  Hallucinations  [x] denies, [] auditory,  [] visual,  [] olfactory, [] tactile  Delusions  [x] denies, [] grandiose,  [] jealous,  [] persecutory,  [] somatic,     [] bizarre  Preoccupations  [] none, [] violence, [] obsessions, [] other     Suicidal ideation  [x] denies, [] endorses  Homicidal ideation [x] denies, [] endorses  Thought process: [x] logical, [] circumstantial, [] tangential     [] concrete, [] disorganized  Associations:  [x] logical and coherent, [] loosening, [] disorganized  Insight:   [] good, [] fair, [x] poor  Judgment:  [] good, [] fair, [x] poor  Attention and concentration:     [] intact, [] limited, [x] able to focus on interview,     [] grossly impaired  Orientation:  [x] person, place, time, situation     [] disoriented to:     Memory:  Remote memory [x] intact, [] impaired     Recent memory  [x] intact, [] impaired          Data  Labs:   Admission on 09/20/2022   Component Date Value Ref Range Status    TSH 09/19/2022 1.15  0.27 - 4.20 uIU/mL Final    Sodium 09/23/2022 135 (A) 136 - 145 mmol/L Final    Potassium reflex Magnesium 09/23/2022 3.5  3.5 - 5.1 mmol/L Final    Chloride 09/23/2022 91 (A) 99 - 110 mmol/L Final    CO2 09/23/2022 26  21 - 32 mmol/L Final    Anion Gap 09/23/2022 18 (A) 3 - 16 Final    Glucose 09/23/2022 118 (A) 70 - 99 mg/dL Final    BUN 09/23/2022 59 (A) 7 - 20 mg/dL Final    Creatinine 09/23/2022 3.1 (A) 0.9 - 1.3 mg/dL Final    GFR Non- 09/23/2022 21 (A) >60 Final    Comment: >60 mL/min/1.73m2 EGFR, calc. for ages 25 and older using the  MDRD formula (not corrected for weight), is valid for stable  renal function. GFR  09/23/2022 25 (A) >60 Final    Comment: Chronic Kidney Disease: less than 60 ml/min/1.73 sq.m. Kidney Failure: less than 15 ml/min/1.73 sq.m. Results valid for patients 18 years and older.       Calcium 09/23/2022 7.8 (A) 8.3 - 10.6 mg/dL Final    Magnesium 09/23/2022 1.20 (A) 1.80 - 2.40 mg/dL Final            Medications  Current Facility-Administered Medications: bisacodyl (DULCOLAX) suppository 10 mg, 10 mg, Rectal, Daily PRN  phosphorus (K PHOS NEUTRAL) tablet 2 tablet, 500 mg, Oral, BID  venlafaxine (EFFEXOR XR) extended release capsule 37.5 mg, 37.5 mg, Oral, Daily with breakfast  traZODone (DESYREL) tablet 150 mg, 150 mg, Oral, Nightly  haloperidol (HALDOL) tablet 10 mg, 10 mg, Oral, Nightly  nicotine (NICODERM CQ) 14 MG/24HR 1 patch, 1 patch, TransDERmal, Daily  polyethylene glycol (GLYCOLAX) packet 17 g, 17 g, Oral, Daily PRN  acetaminophen (TYLENOL) tablet 650 mg, 650 mg, Oral, Q4H PRN  magnesium hydroxide (MILK OF MAGNESIA) 400 MG/5ML suspension 30 mL, 30 mL, Oral, Daily PRN  nicotine polacrilex (COMMIT) lozenge 2 mg, 2 mg, Oral, Q1H PRN  aluminum & magnesium hydroxide-simethicone (MAALOX) 200-200-20 MG/5ML suspension 30 mL, 30 mL, Oral, Q6H PRN  hydrOXYzine HCl (ATARAX) tablet 50 mg, 50 mg, Oral, TID PRN  OLANZapine (ZYPREXA) tablet 5 mg, 5 mg, Oral, Q4H PRN **OR** OLANZapine (ZYPREXA) 5 mg in sterile water 1 mL injection, 5 mg, IntraMUSCular, Q4H PRN  diphenhydrAMINE (BENADRYL) injection 50 mg, 50 mg, IntraMUSCular, Q4H PRN  melatonin tablet 3 mg, 3 mg, Oral, Nightly PRN    ASSESSMENT AND PLAN    Principal Problem:    Schizoaffective disorder, bipolar type (HCC)  Active Problems:    Failure to thrive in adult    Major depressive disorder, recurrent (HCC)    Hypomagnesemia    Acute kidney injury (HCC)    CKD (chronic kidney disease) stage 3, GFR 30-59 ml/min (HCC)    Severe protein-calorie malnutrition (HCC)  Resolved Problems:    * No resolved hospital problems. *     Hospitalists contacted. He will receive fluids and magnesium. I encouraged him to take his medications. 1.Patient's symptoms show no change  2. Probable discharge is next week  3. Discharge planning is incomplete      Plan:  Continue Effexor, haldol and trazodone, same doses  Thank you to Hospitalist service for their assistance      Total time of this patient's care was 40 minutes and more than 50 % of that time was spent coordinating care with members of the treatment team, plus counseling .

## 2022-09-23 NOTE — PLAN OF CARE
Problem: Nutrition Deficit:  Goal: Optimize nutritional status  Outcome: Not Progressing     Problem: Anxiety  Goal: Will report anxiety at manageable levels  Description: INTERVENTIONS:  1. Administer medication as ordered  2. Teach and rehearse alternative coping skills  3. Provide emotional support with 1:1 interaction with staff  Outcome: Progressing       Pt. Withdrawn to room. Refused AM meds this morning, eventually took them at 4PM. Refused to ea, drank a cup of mt. Dew. Pt. Is very unmotivated to get better.  All answers to questions continue to be \"I dont know\"

## 2022-09-24 NOTE — PLAN OF CARE
Problem: Chronic Conditions and Co-morbidities  Goal: Patient's chronic conditions and co-morbidity symptoms are monitored and maintained or improved  9/24/2022 0937 by Woody Clifton RN  Outcome: Not Progressing  9/23/2022 2152 by Michelle Parrish RN  Outcome: Not Progressing     Problem: Depression  Goal: Will be euthymic at discharge  Description: INTERVENTIONS:  1. Administer medication as ordered  2. Provide emotional support via 1:1 interaction with staff  3. Encourage involvement in milieu/groups/activities  4. Monitor for social isolation  9/23/2022 2152 by Michelle Parrish RN  Outcome: Not Progressing     Problem: Behavior  Goal: Pt/Family maintain appropriate behavior and adhere to behavioral management agreement, if implemented  Description: INTERVENTIONS:  1. Assess patient/family's coping skills and  non-compliant behavior (including use of illegal substances)  2. Notify security of behavior or suspected illegal substances which indicate the need for search of the family and/or belongings  3. Encourage verbalization of thoughts and concerns in a socially appropriate manner  4. Utilize positive, consistent limit setting strategies supporting safety of patient, staff and others  5. Encourage participation in the decision making process about the behavioral management agreement  6. If a visitor's behavior poses a threat to safety call refer to organization policy. 7. Initiate consult with , Psychosocial CNS, Spiritual Care as appropriate  9/23/2022 2152 by Michelle Parrish RN  Outcome: Not Progressing     Problem: Self Care Deficit  Goal: Return ADL status to a safe level of function  Description: INTERVENTIONS:  1. Administer medication as ordered  2. Assess ADL deficits and provide assistive devices as needed  3. Obtain PT/OT consults as needed  4.  Assist and instruct patient to increase activity and self care as tolerated  9/23/2022 2152 by Michelle Parrish RN  Outcome: Not Progressing     Problem: Nutrition Deficit:  Goal: Optimize nutritional status  9/23/2022 2152 by Young Shone, RN  Outcome: Not Progressing

## 2022-09-24 NOTE — PROGRESS NOTES
Noted black emesis on bed sheets and pillow case. Writer asked Patient about vomiting. Patient replied, \"yes\". Attempted to clean Patient and change sheets but patient refused any care.

## 2022-09-24 NOTE — BH NOTE
Roxana Sorenson came out to the day room and is currently watching TV. This nurse attempted to weigh Roxana Sorenson with a standing scale but he refused.

## 2022-09-24 NOTE — PLAN OF CARE
Problem: Chronic Conditions and Co-morbidities  Goal: Patient's chronic conditions and co-morbidity symptoms are monitored and maintained or improved  Outcome: Not Progressing     Problem: Depression  Goal: Will be euthymic at discharge  Description: INTERVENTIONS:  1. Administer medication as ordered  2. Provide emotional support via 1:1 interaction with staff  3. Encourage involvement in milieu/groups/activities  4. Monitor for social isolation  Outcome: Not Progressing     Problem: Behavior  Goal: Pt/Family maintain appropriate behavior and adhere to behavioral management agreement, if implemented  Description: INTERVENTIONS:  1. Assess patient/family's coping skills and  non-compliant behavior (including use of illegal substances)  2. Notify security of behavior or suspected illegal substances which indicate the need for search of the family and/or belongings  3. Encourage verbalization of thoughts and concerns in a socially appropriate manner  4. Utilize positive, consistent limit setting strategies supporting safety of patient, staff and others  5. Encourage participation in the decision making process about the behavioral management agreement  6. If a visitor's behavior poses a threat to safety call refer to organization policy. 7. Initiate consult with , Psychosocial CNS, Spiritual Care as appropriate  Outcome: Not Progressing     Problem: Anxiety  Goal: Will report anxiety at manageable levels  Description: INTERVENTIONS:  1. Administer medication as ordered  2. Teach and rehearse alternative coping skills  3. Provide emotional support with 1:1 interaction with staff  9/23/2022 2152 by Shiv Katz RN  Outcome: Progressing     Problem: Self Care Deficit  Goal: Return ADL status to a safe level of function  Description: INTERVENTIONS:  1. Administer medication as ordered  2. Assess ADL deficits and provide assistive devices as needed  3. Obtain PT/OT consults as needed  4.  Assist and instruct patient to increase activity and self care as tolerated  Outcome: Not Progressing     Problem: Nutrition Deficit:  Goal: Optimize nutritional status  9/23/2022 2152 by Jody Coughlin RN  Outcome: Not Progressing     Problem: Chronic Conditions and Co-morbidities  Goal: Patient's chronic conditions and co-morbidity symptoms are monitored and maintained or improved  Outcome: Not Progressing     Problem: Depression  Goal: Will be euthymic at discharge  Description: INTERVENTIONS:  1. Administer medication as ordered  2. Provide emotional support via 1:1 interaction with staff  3. Encourage involvement in milieu/groups/activities  4. Monitor for social isolation  Outcome: Not Progressing     Problem: Behavior  Goal: Pt/Family maintain appropriate behavior and adhere to behavioral management agreement, if implemented  Description: INTERVENTIONS:  1. Assess patient/family's coping skills and  non-compliant behavior (including use of illegal substances)  2. Notify security of behavior or suspected illegal substances which indicate the need for search of the family and/or belongings  3. Encourage verbalization of thoughts and concerns in a socially appropriate manner  4. Utilize positive, consistent limit setting strategies supporting safety of patient, staff and others  5. Encourage participation in the decision making process about the behavioral management agreement  6. If a visitor's behavior poses a threat to safety call refer to organization policy. 7. Initiate consult with , Psychosocial CNS, Spiritual Care as appropriate  Outcome: Not Progressing     Problem: Self Care Deficit  Goal: Return ADL status to a safe level of function  Description: INTERVENTIONS:  1. Administer medication as ordered  2. Assess ADL deficits and provide assistive devices as needed  3. Obtain PT/OT consults as needed  4.  Assist and instruct patient to increase activity and self care as tolerated  Outcome: Not Progressing     Problem: Nutrition Deficit:  Goal: Optimize nutritional status  9/23/2022 2152 by Katherine Landeros RN  Outcome: Not Progressing  9/23/2022 1624 by Baldo Jane LPN  Outcome: Not Progressing  Flowsheets (Taken 9/23/2022 1616 by Heriberto Ziegler, RD, LD)  Nutrient intake appropriate for improving, restoring, or maintaining nutritional needs:   Assess nutritional status and recommend course of action   Monitor oral intake, labs, and treatment plans   Recommend appropriate diets, oral nutritional supplements, and vitamin/mineral supplements    Patient had refused all vital signs, assessment, and encouragement of fluids. Patient is noncompliant with medication regimen. Refused attempt to start an IV for fluids. Patient educated r/t to physical condition. States, \"It's over\". Remains withdrawn to room and is not interactive with others. Denies anxiety, AH/VH, SI/HI.

## 2022-09-24 NOTE — PROGRESS NOTES
Department of Psychiatry  Progress Note    Patient's chart was reviewed. Discussed with treatment team. Met with patient. SUBJECTIVE:      Declining care including medication, food, drink, and medical testing. Declining IV fluids. \"Leave me alone, nothing is wrong, don't worry about it. \"    Tried to discuss the risks of declining care (including death) but he got up abruptly and asked to be left alone. Accepted a small amount of karan mist from me after a great deal of encouragement. ROS:   Patient has new complaints: no  Sleeping adequately:  Yes   Appetite adequate: No:   Engaged in programming: No:     OBJECTIVE:  VITALS:  BP 94/79   Pulse 99   Temp 98.6 °F (37 °C) (Oral)   Resp 16   Ht 5' 6\" (1.676 m)   Wt 106 lb 11.2 oz (48.4 kg)   SpO2 100%   BMI 17.22 kg/m²     Mental Status Examination:    Appearance: fair grooming and hygiene, thin  Behavior/Attitude toward examiner:  no eye contact  Speech: poverty  Mood:  \"fine\"  Affect:  flat     Thought processes:  unable to assess   Thought Content: no SI and HI voiced. Paucity   Perceptions: no RTIS  Attention: impaired?   Abstraction: unable to assess   Cognition:  unable to assess   Insight: unable to assess   Judgment: unable to assess     Medication:  Scheduled:   magnesium oxide  400 mg Oral BID    phosphorus  500 mg Oral BID    venlafaxine  37.5 mg Oral Daily with breakfast    traZODone  150 mg Oral Nightly    haloperidol  10 mg Oral Nightly    nicotine  1 patch TransDERmal Daily        PRN:  bisacodyl, polyethylene glycol, acetaminophen, magnesium hydroxide, nicotine polacrilex, aluminum & magnesium hydroxide-simethicone, hydrOXYzine HCl, OLANZapine **OR** OLANZapine (ZyPREXA) in sterile water IntraMUSCular, diphenhydrAMINE, melatonin     ASSESSMENT AND PLAN:    Principal Problem:    Schizoaffective disorder, bipolar type (HCC)  Active Problems:    Failure to thrive in adult    Major depressive disorder, recurrent ([facility] [address] Hypomagnesemia    Acute kidney injury ([facility] [address]    CKD (chronic kidney disease) stage 3, GFR 30-59 ml/min (HCC)    Severe protein-calorie malnutrition (HCC)  Resolved Problems:    * No resolved hospital problems. *       1. Patient s symptoms   show no change. Pt declining care and support. 2.Probable discharge is TBD   3. Discharge planning is incomplete    Total time with patient was 35 minutes and more than 50 % of that time was spent counseling the patient on their symptoms, treatment, and expected goals.        Alanna Duggan MD

## 2022-09-24 NOTE — BH NOTE
Jordan Momin was in the day room, sitting at a table with other patients at the time dinner arrived. Vitaly's dinner was placed in front of him. Avery Ferrari sat at the table for a few minutes, never touching the meal, then he went and lay down in the bed. After laying in bed for about 5-7 minutes, Jordan Momin came back to the table and sat in the same chair that he was in earlier. He did not touch his meal.      Jordan Momin sat with his peers and listened when they talked with him, but he did not engage in any conversation with his peers.

## 2022-09-24 NOTE — BH NOTE
Michael Rausch came to the desk and asked for [facility] to drink. Michael Rausch was instructed that he needs to drink an Ensure before he will be given a [facility]. Michael Rausch was provided with an Ensure which he took to his room. About 5 minutes later, Michael Rausch came out with the empty Ensure container and stated that he drank the Ensure and then vomited it. When the toilet was checked there was Ensure that had been dripped on the seat and on the floor. It appeared that Michael Rausch had poured the Ensure down the toilet.

## 2022-09-24 NOTE — BH NOTE
While Vitaly was up, this nurse zeroed the bed scale. When Vitaly lay back down, this nurse checked his weight which is 106.7 pounds.

## 2022-09-24 NOTE — PROGRESS NOTES
Called floor @ [phone] about pt coming down for CT scan; RN stated pt is not complying and is still refusing scan at this time

## 2022-09-24 NOTE — PROGRESS NOTES
Senior Purposeful Rounding    Position:Left Side and Repositions Self    Physical Environment:Room free from clutter, Clear path to bathroom , Adequate lighting, and No safety hazards noted    Pain Rating/ Nonverbal Pain Behaviors:0;    Pain interventions Attempted: Patient accepted assistance to clean up and change linens.      Patient Toileted:Continent and Independent

## 2022-09-24 NOTE — PROGRESS NOTES
Patient c/o some abdominal pain, nausea, vomiting. I attempted to encourage the patient to let us get a CT scan, place an IV, and give Fluids. Patient states, \"get out of here. I don't want any of that. \"    Iain Schmidt [facility]  9/24/2022

## 2022-09-24 NOTE — PROGRESS NOTES
Patient refused medication. Spoke at length with patient r/t not eating or drinking. Patient stated \"It's over\". Patient given education r/t to physical condition, and need for improvement. Patient stated that it was not going to happen. Writer requested patient to think about what was addressed and provided patient with water labeled as such. Patient stated understanding.

## 2022-09-24 NOTE — PLAN OF CARE
Problem: Chronic Conditions and Co-morbidities  Goal: Patient's chronic conditions and co-morbidity symptoms are monitored and maintained or improved  9/24/2022 0941 by Chapincito Pratt RN  Outcome: Not Sarwat Brittle has been laying in bed this morning. He has not come out of the room. No socialization with peers this morning. No further emesis. Christine Vazquez refused his CT scan, his IV infusion, all of his morning medications, including his nicotine patch. Christine Vazquez refused to allow this nurse to interview him. Christine Vazquez did deny suicidal and homicidal ideation this morning. Christine Vazquez did not appear to be responding to internal stimuli this morning. Problem: Nutrition Deficit:  Goal: Optimize nutritional status  9/24/2022 0941 by Chapincito Pratt RN  Outcome: Not Progressing  9/23/2022 2152 by Yoana Tejeda RN  Outcome: Not Progressing     Christine Vazquez refused breakfast, including all fluids; he refused all medications and treatments and he did not want to interview with this nurse this morning.

## 2022-09-24 NOTE — PROGRESS NOTES
Called nurse, she states she does not think pt will come downstairs and comply for CT, she will call back with more info [id number]   Per nurse Breanna, they will try tomorrow 9/24 to get the CT done, patient is not able to come down today 9/23.  AJ [id number]

## 2022-09-25 PROBLEM — I46.9 CARDIAC ARREST (HCC): Status: ACTIVE | Noted: 2022-01-01

## 2022-09-25 NOTE — ED NOTES
Care assumed from MORENO RAMOS [facility], [address] [place] [address], RN  09/25/22 0975

## 2022-09-25 NOTE — PROGRESS NOTES
Patient intubated in er 1 with 8ett 25 at lip.   Placed on ventilator on settings of ac 16/450/100%/peep of 5

## 2022-09-25 NOTE — PROGRESS NOTES
Pharmacy Note  Vancomycin Consult    Marj Nieves is a 59 y.o. male started on Vancomycin for sepsis (7 days); consult received from Dr. Brandy Pelletier to manage therapy. Also receiving the following antibiotics: cefepime. Recent Labs     09/23/22  1518   BUN 59*   CREATININE 3.1*       Estimated Creatinine Clearance: 18 mL/min (A) (based on SCr of 3.1 mg/dL (H)). Goal Trough Level: 15-20 mcg/mL  Goal AUC: 400-600 mg/L    Assessment/Plan:  Will initiate Vancomycin with a one time loading dose of 1000 mg x1, followed by pulse dosing. Thank you for the consult. Will continue to follow.     Will Guidry, PharmD, [facility], 9/25/2022 5:01 AM

## 2022-09-25 NOTE — CODE DOCUMENTATION
Pt turned and cleaned. R Pupil noted to be 5 mm and Left 2 mm. Dr Brandie Angela aware.  CT head ordered

## 2022-09-25 NOTE — PROGRESS NOTES
09/25/22 0407   Patient Observation   Observations 8ett 25 at lip   Breath Sounds   Right Upper Lobe Rhonchi   Right Middle Lobe Diminished   Right Lower Lobe Diminished   Left Upper Lobe Rhonchi   Left Lower Lobe Diminished   Vent Information   $Ventilation $Initial Day   Vent Patient Data (Readings)   Vt (Measured) 427 mL   Peak Inspiratory Pressure (cmH2O) 19 cmH2O   Rate Measured 16 br/min   Minute Volume (L/min) 7.5 Liters   Mean Airway Pressure (cmH2O) 9.1 cmH20   I:E Ratio 1:3.6   Flow Sensitivity 3 L/min   Vent Alarm Settings   Low Minute Volume (lpm) 2.5 L/min   Low Exhaled Vt (ml) 250 mL   RR High (bpm) 40 br/min   Apnea (secs) 20 secs

## 2022-09-25 NOTE — PLAN OF CARE
Problem: Self Care Deficit  Goal: Return ADL status to a safe level of function  Description: INTERVENTIONS:  1. Administer medication as ordered  2. Assess ADL deficits and provide assistive devices as needed  3. Obtain PT/OT consults as needed  4. Assist and instruct patient to increase activity and self care as tolerated  Outcome: Not Progressing     Problem: Chronic Conditions and Co-morbidities  Goal: Patient's chronic conditions and co-morbidity symptoms are monitored and maintained or improved  Recent Flowsheet Documentation  Taken 9/24/2022 2156 by Jayy Burnham RN  Care Plan - Patient's Chronic Conditions and Co-Morbidity Symptoms are Monitored and Maintained or Improved: Monitor and assess patient's chronic conditions and comorbid symptoms for stability, deterioration, or improvement  9/24/2022 0941 by Gretta Aden RN  Outcome: Not Progressing  9/24/2022 0937 by Gretta Aden RN  Outcome: Not Progressing     Problem: Behavior  Goal: Pt/Family maintain appropriate behavior and adhere to behavioral management agreement, if implemented  Description: INTERVENTIONS:  1. Assess patient/family's coping skills and  non-compliant behavior (including use of illegal substances)  2. Notify security of behavior or suspected illegal substances which indicate the need for search of the family and/or belongings  3. Encourage verbalization of thoughts and concerns in a socially appropriate manner  4. Utilize positive, consistent limit setting strategies supporting safety of patient, staff and others  5. Encourage participation in the decision making process about the behavioral management agreement  6. If a visitor's behavior poses a threat to safety call refer to organization policy.   7. Initiate consult with , Psychosocial CNS, Spiritual Care as appropriate  Outcome: Not Progressing  Flowsheets (Taken 9/24/2022 2156)  Patient/family maintains appropriate behavior and adheres to behavioral management agreement, if implemented:   Utilize positive, consistent limit setting strategies supporting safety of patient, staff and others   Encourage verbalization of thoughts and concerns in a socially appropriate manner   Assess patient/familys coping skills and  non-compliant behavior (including use of illegal substances)     Problem: Self Care Deficit  Goal: Return ADL status to a safe level of function  Description: INTERVENTIONS:  1. Administer medication as ordered  2. Assess ADL deficits and provide assistive devices as needed  3. Obtain PT/OT consults as needed  4. Assist and instruct patient to increase activity and self care as tolerated  Outcome: Not Progressing     Problem: Nutrition Deficit:  Goal: Optimize nutritional status  9/24/2022 0941 by Lashaun Pederson RN  Outcome: Not Progressing     Patient continues to refuse vitals, medications, food, and drink.

## 2022-09-25 NOTE — ED NOTES
Dr. Aimee Quintero returned call and is speaking with Dr. Janis Christensen at this time.      Nata Huerta  09/25/22 0578

## 2022-09-25 NOTE — ED NOTES
DO called to bedside. Family called about pt's care. Family decided to withdraw care at this time.      Vikash Montoya RN  09/25/22 2962

## 2022-09-25 NOTE — ED PROVIDER NOTES
8:11 AM  Patient is currently getting pressure bag blood, is nearly maxed out on levo, increasing requirements of epi. Faint pulse to palpation, cardiac activity is largely an organized on point-of-care ultrasound echocardiogram.    Brief exam reveals increasingly distended abdomen, mottled skin's, fixed and dilated pupils to 4 to 5 mm. Unresponsive not on sedation. GCS is 3T. I discussed with brother and ex-wife, brother would like us to de-escalate care; take off of pressors, stop any medications, and make him comfortable, letting him \"die in peace. \"  He is very thankful that the  was able to do last rights. He states that \" he would not want any of this anymore,\" and understands that as soon as I take off the pressors and the vent, patient will likely die today in the emergency department. He says Donna Oh, its ok to stop everything. \"  They agree with comfort measures. Witnessed by 2 RNs. At this time, I ordered another 100 mg of fentanyl for comfort, music is playing in his room, and we will stop pressors and turn off the vent. He is hypotensive with blood pressures in the 15K, systolic. 8:25 AM  Patient Is agonal breathing over the ETT, very sporadically, about 8/min. Vent is off. Pressors off. I do not palpate a pulse. Comfort measures ordered; Versed as needed, morphine as needed for agonal breathing and oxygen hunger. 8:50 AM  Time of death 849 a.m. Patient had not been given any morphine or Versed, did get 2 mg of Ativan earlier. No agonal breathing, no palpable pulses, no heartbeat to auscultation. Eyes fixed and dilated. No organized cardiac activity. Will update brother. 9:08 AM    ICD-10-CM    1. Hemorrhagic shock (HCC)  R57.8       2. Cardiac arrest ([address]  I46.9       3. Bowel perforation ([address]  K63.1       4. Lactic acidosis  E87.2       5. Hematemesis, presence of nausea not specified  K92.0       6.  Severe sepsis (HCC)  A41.9     R65.20           TERRY L DO SULAIMAN      Total critical care time is 45 minutes, which excludes separately billable procedures and updating family. Time spent is specifically for management of the presenting complaint and symptoms initially, direct bedside care, reevaluation, review of records, and consultation. There was a high probability of clinically significant life-threatening deterioration in the patient's condition, which required my urgent intervention.        Itz Branch, DO  09/25/22 [address] [place] [address]  09/25/22 3982

## 2022-09-25 NOTE — ED NOTES
[facility] notified of pt's death Reference number [id number]      Edouardmichelle Roger, ERICA  09/25/22 9407

## 2022-09-25 NOTE — FLOWSHEET NOTE
While this writer was sitting with the patient for safety, patient pulled out a lemon size hard piece of stool from his body and diarrhea also came out a large amount. Nursing changed the bedding and cleaned the patient with soap and water. Patient had a second bowel movement a golf ball size he pulled out from his pants. Nursing again cleaned the patient and bedding and put his clothes in the wash. Patient asked for fluids and was given several glasses of soda and ice. Patient was unsteady on his gait so nursing helped him to his bathroom sink where he put his head down to sink in a way that appeared he felt like vomiting but nothing came out. Upon return to his bed, the patient became very weak and his legs buckled and this writer helped him to the floor. At this point his upper body appeared very rigid and when his head was laid on a pillow, he vomited up approximately 300 ccs of brown emesis with coffee grounds. Nurse called for nurses and to call a rapid response. The vitals were attempted to be taken with no success. Patient not responding to questions or commands. Pupils fixed and dilated and would not react to light. His coloring began to turn yellow. Patient was put in an upright position to avoid aspiration. Patient began to vomit again and he was laid in the recovery position. He vomited a second time another moderate amount of brown coffee grind emesis approximately 200 ccs. Patient was put back in an upright position and started to breathe irregularly and a pulse was very weak. This writer called a [employer] and for the crash cart. The Code team showed up and patient appeared to be breathing very shallow and slow and irregular. Patient still was not responding to nurse but his eyes were open and pulse present. Patient was put on gurney by Code Team and taken off the unit. Dr. Krissy Lee was made aware of the events listed above and patient's transfer to ED.

## 2022-09-25 NOTE — ED NOTES
[place]  pulmonology for a consult. Cardiac Arrest. Small Bowel Perf     Mumtaz Hunt  09/25/22 0731    0748-pt. Was assigned an ICU bed. consult complete.       Mumtaz Hunt  09/25/22 0798

## 2022-09-25 NOTE — PROGRESS NOTES
09/25/22 0550   Patient Observation   Heart Rate (!) 127   Resp (!) 32   Vent Information   Vent Mode AC/VC   Ventilator Settings   FiO2  (S)  80 %  (decreased to 80%)   Vt (Set, mL) 430 mL   Resp Rate (Set) (S)  24 bmp  (increased from 16 to 24)   PEEP/CPAP (cmH2O) (S)  8  (increased from 5 to 8)

## 2022-09-25 NOTE — ED PROVIDER NOTES
[address] ED  eMERGENCY dEPARTMENT eNCOUnter      Pt Name: Nate Aguila  MRN: [id number]  [place] 1965  Date of evaluation: 9/25/2022  Provider: Malcom Hancock MD    CHIEF COMPLAINT       Chief Complaint   Patient presents with    Cardiac Arrest     From behavioral, cardiac arrest. Called for unresponsive patient. HISTORY OF PRESENT ILLNESS   (Location/Symptom, Timing/Onset, Context/Setting, Quality, Duration, Modifying Factors, Severity)  Note limiting factors. Nate Aguila is a 59 y.o. male with hx of hypertension, hyperlipidemia, diabetes, and schizoaffective disorder who presents as a cardiac arrest.  The patient was admitted to this hospital's geriatric psychiatry downing for psychiatry treatment however immediately preceding his arrival to the emergency department he began to vomit dark black concerning for possible blood or fecal material and in the process of bringing him down to the emergency department he suffered a cardiac arrest.  On arrival to the emergency department the patient is pulseless, apneic, and unresponsive. HPI    Nursing Notes were reviewed. REVIEW OFSYSTEMS    (2-9 systems for level 4, 10 or more for level 5)     Review of Systems   Unable to perform ROS: Patient unresponsive     Except as noted above the remainder of the review of systems was reviewed and negative.        PAST MEDICAL HISTORY     Past Medical History:   Diagnosis Date    Anxiety     Arthritis     Clostridium difficile diarrhea 01/10/2015    Depression     Difficulty urinating     DM (diabetes mellitus) ([facility]     Hematoma     pt unsure where it was    Hx of blood clots     pt unsure where    Hyperlipidemia     Hypertension     Pneumonia     Psychosis ([facility] [address]     Schizoaffective disorder, bipolar type [facility])          SURGICAL HISTORY       Past Surgical History:   Procedure Laterality Date    COLONOSCOPY      COLONOSCOPY  11/30/2016    colon polyp    TONSILLECTOMY           CURRENT MEDICATIONS       Previous Medications    HALOPERIDOL (HALDOL) 10 MG TABLET    Take 1 tablet by mouth at bedtime    PHOSPHORUS (K PHOS NEUTRAL) [id number] MG TABLET    Take 1 tablet by mouth 2 times daily for 5 days    TRAZODONE (DESYREL) 150 MG TABLET    Take 1 tablet by mouth nightly    TRAZODONE (DESYREL) 50 MG TABLET    Take 1 tablet by mouth nightly for 30 days. VENLAFAXINE (EFFEXOR XR) 37.5 MG EXTENDED RELEASE CAPSULE    Take 1 capsule by mouth every morning (before breakfast)       ALLERGIES     Patient has no known allergies. FAMILY HISTORY       Family History   Problem Relation Age of Onset    Heart Disease Mother     Mental Illness Mother     Cancer Father     Heart Disease Brother     Mental Illness Brother           SOCIAL HISTORY       Social History     Socioeconomic History    Marital status:     Number of children: 1    Years of education: 11   Tobacco Use    Smoking status: Some Days     Packs/day: 0.50     Years: 40.00     Pack years: 20.00     Types: Cigarettes    Smokeless tobacco: Never    Tobacco comments:     refused counseling; smokes less than a pack/day   Substance and Sexual Activity    Alcohol use: Not Currently    Drug use: Yes     Types: Marijuana ([place])    Sexual activity: Not Currently         PHYSICAL EXAM    (up to 7 for level 4, 8 or more for level 5)     ED Triage Vitals   BP Temp Temp src Heart Rate Resp SpO2 Height Weight   09/25/22 0345 09/25/22 0508 -- 09/25/22 0345 09/25/22 0345 09/25/22 0532 -- --   (!) 132/120 (!) 92.5 °F (33.6 °C)  (!) 212 10 93 %         Physical Exam  Constitutional:       Comments: Unresponsive chronically ill-appearing elderly  male with emesis around mouth. HENT:      Head: Atraumatic. Eyes:      Conjunctiva/sclera: Conjunctivae normal.      Comments: Pupils 3 mm, round, equal, and sluggishly reactive   Cardiovascular:      Comments: No pulses  Abdominal:      General: There is distension.    Neurological:      Mental Status: He is unresponsive. GCS: GCS eye subscore is 1. GCS verbal subscore is 1. GCS motor subscore is 1. DIAGNOSTIC RESULTS       RADIOLOGY:     Interpretation per the Radiologist below, if available at the time of this note:    CT ABDOMEN PELVIS WO CONTRAST Additional Contrast? None   Final Result   1. Large volume pneumoperitoneum is present, compatible with bowel   perforation. There is also extensive small bowel pneumatosis and portal   venous gas concerning for extensive bowel ischemia as a source for the bowel   perforation. 2.  Large amount of extraperitoneal gas tracking in the abdomen on the right   side. There is also subcutaneous gas in the right abdominal wall extending   into the scrotum and apparent gas both within the bladder lumen and bladder   wall. The possibility of emphysematous cystitis should be considered. 3.  Small volume abdominopelvic ascites. 4.  Dependent opacities in the lower lobes compatible with aspiration. Findings were discussed with RAMIRO STANTON at 5:46 am on 9/25/2022. CT HEAD WO CONTRAST   Final Result   No acute intracranial abnormality. XR CHEST PORTABLE   Final Result   1. Endotracheal tube terminates in appropriate position. 2. Enteric tube terminates at the level the body of the stomach. 3.  Right internal jugular line terminates at the level of the distal SVC. 4.  Relatively diffuse bilateral ground-glass opacities, which may represent   an inflammatory process or infection. 5.  Abnormal gas lucency in the upper abdomen, which could represent   subcutaneous emphysema and/or pneumoperitoneum.   Abdominal CT imaging has   been scheduled at the time of this report               LABS:  Labs Reviewed   CBC WITH AUTO DIFFERENTIAL - Abnormal; Notable for the following components:       Result Value    WBC 1.5 (*)     RBC 2.89 (*)     Hemoglobin 8.7 (*)     Hematocrit 26.5 (*)     Platelets 738 (*) Neutrophils Absolute 0.8 (*)     Lymphocytes Absolute 0.5 (*)     Bands Relative 15 (*)     Metamyelocytes Relative 4 (*)     Toxic Granulation Present (*)     Anisocytosis Occasional (*)     Poikilocytes 3+ (*)     All other components within normal limits    Narrative:     Nemesio Cai  [facility] tel. [phone],  Hematology results called to and read back by Jayashree Bone RN, 09/25/2022 06:15, by  [id number] [facility]/ REFLEX TO MG FOR LOW K - Abnormal; Notable for the following components:    Sodium 135 (*)     Chloride 84 (*)     CO2 17 (*)     Anion Gap 34 (*)     Glucose 262 (*)      (*)     Creatinine 4.8 (*)     GFR Non- 13 (*)     GFR  15 (*)     Calcium 6.4 (*)     Total Protein 4.1 (*)     Albumin 1.4 (*)     Albumin/Globulin Ratio 0.5 (*)     AST 40 (*)     All other components within normal limits    Narrative:     RAMANA Pérez  [facility] tel. [phone],  Chemistry results called to and read back by Grecia Archuleta RN, 09/25/2022  05:24, by [facility]   LACTIC ACID - Abnormal; Notable for the following components:    Lactic Acid 15.1 (*)     All other components within normal limits    Narrative:     [address] [phone],  Chemistry results called to and read back by Brie Ricketts RN, 09/25/2022  05:11, by [facility]   CBC - Abnormal; Notable for the following components:    WBC 0.8 (*)     RBC 3.09 (*)     Hemoglobin 9.2 (*)     Hematocrit 28.3 (*)     Platelets 712 (*)     All other components within normal limits    Narrative:     [address] [phone],  Hematology results called to and read back by Jayashree Bone RN, 09/25/2022 06:38, by  [facility], VENOUS - Abnormal; Notable for the following components:    pH, Eric 6.998 (*)     pCO2, Eric 57.5 (*)     HCO3, Venous 13.8 (*)     Base Excess, Eric -17.0 (*)     All other components within normal limits    Narrative:     ORDER WAS CANCELLED 09/25/2022 05:57, Suspect Venous sample. RN to redraw.   09/25/2022 05:57.  CALL  Pérez  [facility] tel. [phone],  Chemistry results called to and read back by Heather Smith RN, 09/25/2022  05:58, by [facility]   CULTURE, BLOOD 1   CULTURE, BLOOD 2   TROPONIN   BASIC METABOLIC PANEL   BLOOD GAS, ARTERIAL   TYPE AND SCREEN   PREPARE RBC (CROSSMATCH)       All otherlabs were within normal range or not returned as of this dictation. EMERGENCY DEPARTMENT COURSE and DIFFERENTIAL DIAGNOSIS/MDM:   Vitals:    Vitals:    09/25/22 0550 09/25/22 0601 09/25/22 0611 09/25/22 0625   BP:  (!) 64/48 86/70 81/62   Pulse: (!) 127 (!) 124 (!) 120 (!) 120   Resp: (!) 32 27 27 27   Temp:    (!) 92.8 °F (33.8 °C)   SpO2:    92%         Is this patient to be included in the SEP-1 Core Measure due to severe sepsis or septic shock? Yes   SEP-1 CORE MEASURE DATA      Sepsis Criteria   Severe Sepsis Criteria   Septic Shock Criteria     Must be confirmed or suspected to move forward with diagnosis of sepsis. Must meet 2:    [x] Temperature > 100.9 F (38.3 C)        or < 96.8 F (36 C)  [] HR > 90  [] RR > 20  [x] WBC > 12 or < 4 or 10% bands      AND:      [x] Infection Confirmed or        Suspected. Must meet 1:    [x] Lactate > 2       or   [x] Signs of Organ Dysfunction:    - SBP < 90 or MAP < 65  - Altered mental status  - Creatinine > 2 or increased from      baseline  - Urine Output < 0.5 ml/kg/hr  - Bilirubin > 2  - INR > 1.5 (not anticoagulated)  - Platelets < 927,405  - Acute Respiratory Failure as     evidenced by new need for NIPPV     or mechanical ventilation      [] No criteria met for Severe Sepsis. Must meet 1:    [x] Lactate > 4        or   [x] SBP < 90 or MAP < 65 for at        least two readings in the first        hour after fluid bolus        administration      [x] Vasopressors initiated (if hypotension persists after fluid resuscitation)        [] No criteria met for Septic Shock.    Patient Vitals for the past 6 hrs:   BP Temp Pulse Resp SpO2   09/25/22 0345 -- -- (!) 212 10 -- 09/25/22 0345 (!) 132/120 -- (!) 194 -- --   09/25/22 0346 (!) 132/120 -- (!) 172 13 --   09/25/22 0348 -- -- (!) 179 -- --   09/25/22 0349 (!) 58/47 -- -- -- --   09/25/22 0350 (!) 63/44 -- -- -- --   09/25/22 0355 (!) 52/34 -- (!) 122 -- --   09/25/22 0358 -- -- (!) 105 -- --   09/25/22 0358 (!) 52/29 -- (!) 105 -- --   09/25/22 0401 (!) 60/36 -- 92 16 --   09/25/22 0404 (!) 47/32 -- 97 -- --   09/25/22 0406 (!) 51/38 -- 93 -- --   09/25/22 0410 (!) 50/30 -- 96 17 --   09/25/22 0413 (!) 58/42 -- 94 15 --   09/25/22 0418 (!) 64/39 -- -- -- --   09/25/22 0418 (!) 65/50 -- (!) 108 16 --   09/25/22 0421 -- -- (!) 112 -- --   09/25/22 0426 (!) 67/39 -- (!) 109 19 --   09/25/22 0427 (!) 66/48 -- -- -- --   09/25/22 0427 (!) 68/52 -- (!) 114 19 --   09/25/22 0436 84/69 -- (!) 123 -- --   09/25/22 0442 (!) 66/50 -- (!) 118 21 --   09/25/22 0442 (!) 68/48 -- (!) 124 21 --   09/25/22 0446 105/60 -- (!) 115 19 --   09/25/22 0459 (!) 65/50 -- (!) 117 25 --   09/25/22 0508 -- (!) 92.5 °F (33.6 °C) -- -- --   09/25/22 0532 (!) 126/104 (!) 92.3 °F (33.5 °C) (!) 119 24 93 %   09/25/22 0550 -- -- (!) 127 (!) 32 --   09/25/22 0601 (!) 64/48 -- (!) 124 27 --   09/25/22 0611 86/70 -- (!) 120 27 --   09/25/22 0625 81/62 (!) 92.8 °F (33.8 °C) (!) 120 27 92 %      Recent Labs     09/23/22  1518 09/25/22  0415 09/25/22  0608   WBC  --  1.5* 0.8*   LACTA  --  15.1*  --    CREATININE 3.1* 4.8*  --    BILITOT  --  0.4  --    PLT  --  128* 130*         Time Septic Shock Identified: 4:00am    Fluid Resuscitation Rational: at least 30mL/kg based on entered actual weight at time of triage    Repeat lactate level: ordered and pending at this time    Reassessment Exam:   I have reassessed tissue perfusion and hemodynamic status after fluid bolus at this time: Irregularly irregular tachycardic rhythm,  palpable distal pulses, mechanical lung sounds. MDM  Arrival to the emergency department the patient is in cardiac arrest.  CPR started. Hospitalist is present and intubated the patient. Patient does have an extended code with 3 rounds of bicarb and 4 rounds of epinephrine. He remains in PEA until ROSC is achieved. The patient is hypotensive however has pulses. Central line is placed and patient started on both norepinephrine and epinephrine drips. CT of the abdomen shows evidence of a small bowel perforation with free air in the abdomen. Patient also has a drop in hemoglobin to 8 from a baseline of 13. Patient is started on a blood transfusion and Protonix drip. General surgery is consulted and will see the patient once he is admitted to the ICU but understandably he is too unstable to take to the operating room at this time. The patient is admitted to the ICU for further care. The hospitalist has spoken to the patient's ex-wife who does not feel comfortable making a decision on his CODE STATUS as they are estranged. I have personally spoken to the patient's brother Linda Pickering who reports that he was spoken to the rest the family including the patient's sister and the family would like to make him DNR at this time. We have discussed with the patient's prognosis and to the events today in the emergency department. They are in agreement with plan to not extubate the patient and to continue IV medications including blood transfusion and antibiotics and pressors however do not want the patient to have CPR performed again if he loses pulses. This is documented in a DNR arrest is signed.  The Patient is admitted for further care.    :  [facility]  IP CONSULT TO HOSPITALIST  IP CONSULT TO CRITICAL CARE    PROCEDURES:  Unless otherwise noted below, none     Critical Care  Performed by: Barbara Haas MD  Authorized by: Barbara Haas MD     Critical care provider statement:     Critical care time (minutes):  90    Critical care time was exclusive of:  Separately billable procedures and treating other patients and teaching time    Critical care was necessary to treat or prevent imminent or life-threatening deterioration of the following conditions:  Shock, respiratory failure, circulatory failure and CNS failure or compromise    Critical care was time spent personally by me on the following activities:  Ordering and performing treatments and interventions, development of treatment plan with patient or surrogate, ordering and review of laboratory studies, discussions with consultants, ordering and review of radiographic studies, pulse oximetry, evaluation of patient's response to treatment, re-evaluation of patient's condition and examination of patient  Central Line    Date/Time: 9/25/2022 7:15 AM  Performed by: Kalyn Senior MD  Authorized by: Kalyn Senior MD     Consent:     Consent obtained:  Emergent situation  Pre-procedure details:     Indication(s): central venous access      Hand hygiene: Hand hygiene performed prior to insertion      Sterile barrier technique: All elements of maximal sterile technique followed      Skin preparation:  Chlorhexidine    Skin preparation agent: Skin preparation agent completely dried prior to procedure    Sedation:     Sedation type:  None  Procedure details:     Location:  R internal jugular    Patient position:  Supine    Procedural supplies:  Triple lumen    Catheter size:  7 Fr    Landmarks identified: yes      Ultrasound guidance: yes      Ultrasound guidance timing: real time      Sterile ultrasound techniques: Sterile gel and sterile probe covers were used      Number of attempts:  1    Successful placement: yes    Post-procedure details:     Post-procedure:  Dressing applied and line sutured    Assessment:  Blood return through all ports and no pneumothorax on x-ray    Procedure completion:  Tolerated well, no immediate complications    FINAL IMPRESSION      1. Cardiac arrest ([address]    2. Bowel perforation ([address]    3. Lactic acidosis    4.  Hematemesis, presence of nausea not specified          DISPOSITION/PLAN   DISPOSITION Decision To Admit 09/25/2022 06:32:25 AM          (Please note that portions of this note were completed with a voice recognition program.  Efforts were made to edit the dictations but occasionally words aremis-transcribed. )    Patric Guzmán MD (electronically signed)  Attending Emergency Physician           Patric Guzmán MD  09/25/22 9527

## 2022-09-25 NOTE — Clinical Note
Discharge Plan[de-identified] Other/[facility])   Telemetry/Cardiac Monitoring Required?: Yes

## 2022-09-25 NOTE — PROGRESS NOTES
Pt restless. In and out of bed. Walking steadily at the beginning of the shift. Refused VS, meds, food/fluids. Slid to the floor outside of his doorway which was witnessed by staff. Pt did not appear to hit his head per the staff that witnessed the fall. Dynamap wouldn't read BP due to pt's resistance to care. Manual BP 78/32 with pulse in the 80's. FSBS = 126. Pt agitated. Hostile. Sarcastic. Dr. Sandro Desir made aware of everything stated above. No new orders. Staff sitting with pt at this time for safety.  Will monitor overnight and physician will re evaluate in AM.

## 2022-09-26 LAB — HEMATOLOGY PATH CONSULT: NORMAL

## 2022-09-27 LAB
BLOOD CULTURE, ROUTINE: ABNORMAL
BLOOD CULTURE, ROUTINE: ABNORMAL
CULTURE, BLOOD 2: ABNORMAL
ORGANISM: ABNORMAL

## 2022-10-07 NOTE — PROGRESS NOTES
Physician Progress Note      PATIENTClifm Reveal  CSN #:                  [id number]  :                       1965  ADMIT DATE:       9/15/2022 6:46 PM  [address] DATE:        2022 2:28 PM  RESPONDING  PROVIDER #:        Criselda Marrero MD          QUERY TEXT:    Pt admitted with RANDI. Noted documentation of severe malnutrition by Dietary   consultant. If possible, please document in progress notes and discharge   summary:    The medical record reflects the following:  Risk Factors: mental health disturbance, poor PO intake, adult failure to   thrive  Clinical Indicators: severe fat and muscle loss, Dietary states severe   malnutrition, BMI 17.4, electrolyte disturbances  Treatment: IVF, serial labs, Dietary consult, daily weights, I&O's nutritional   supplements, supportive care    Thank you,  Rad Blanton RN, CDS  [email or web address]  Options provided:  -- Severe malnutrition confirmed present on admission  -- Severe malnutrition ruled out  -- Other - I will add my own diagnosis  -- Disagree - Not applicable / Not valid  -- Disagree - Clinically unable to determine / Unknown  -- Refer to Clinical Documentation Reviewer    PROVIDER RESPONSE TEXT:    The diagnosis of severe malnutrition was confirmed as present on admission. Query created by: Betty Gill on 10/5/2022 10:39 AM      Electronically signed by:   Criselda Marrero MD 10/7/2022 11:05 AM

## 2022-11-03 NOTE — DISCHARGE SUMMARY
weekend. Pt gave limited information at that time. I attempted to speak with pt this morning, but he refused all conversation. Pt alley to tell me that he is sleeping and eating ok. Per nursing, pt has not eaten, is refusing all medications and care. I attempted to ask pt about his medications and why was not taking, he held up his hand to end my questions, shaking his head no. Pt is not willing to speak at this time. Pt appeared paranoid, guarded, but did not appear to be RTIS. Medications were continued from his inpatient stay, will continue to discuss medications and encourage eating and care. Plan:  Restarted Haldol 10 mg QHS  Restarted Effexor 37.5 mg QD  Restarted Trazodone 150 mg QD      PAST PSYCHIATRIC HISTORY:  Schizoaffective disorder, bipolar type , MDD    Hospital Course:  1. Admitted to senior psychiatry for stabilization and treatment. 2. On admission, resumed Haldol, Effexor, and Trazodone. Ordered q15min checks for safety, programming, and prn medication for anxiety, agitation, and insomnia. 9/22/2022 - Per Dr. Lila Ramírez, Did not engage with staff overnight for the most part, refused meds and did not eat. This AM his CM came and he said it made him feel much better. Armen Nyhan said he wants to go home. However he has only taken one dose of medication and has not eaten yet. I expressed to him that the fact that he has not eaten yet concerns me, especially since when he came in he had not been caring for himself. Armen Nyhan agreed that he will take his medications and eat. 9/23/2022 - Per Dr. Lila Ramírez, complains of decreased appetite, nausea. Says he does not want to eat because he is worried that he won't be able to keep it down. He has also refused meds. He has drunk some [facility] on occasion, but is generally resistant to drinking fluids. Labs were drawn. Agrees that he is depressed, but says that his physical symptoms are his main concern right now.    The treatment team met and discussed the treatment plan. 9/24/2022 -  Per CNP, Patient c/o some abdominal pain, nausea, vomiting. I attempted to encourage the patient to let us get a CT scan, place an IV, and give Fluids. Patient states, \"get out of here. I don't want any of that. \"     I met with him too. Declining care including medication, food, drink, and medical testing. Declining IV fluids. \"Leave me alone, nothing is wrong, don't worry about it. \" Tried to discuss the risks of declining care (including death) but he got up abruptly and asked to be left alone. Accepted a small amount of karan mist from me after a great deal of encouragement. Later he coded and was sent to the ED. 3. Hospitalist consult on admission. #Constipation  #Nausea  - check KUB  - PRN Zofran, pt has been refusing this  - PRN medication ordered, pt has been refusing them   - encourage fluids     #chronic kidney disease stage IIIa  - was admitted and given IVF, pt pulled out IV and refused further treatment. He was then transferred to psych for further treatment. - recheck BMP, pt with poor po intake     #HX of Hyponatremia  - during recent admission which resolved with IVF     #Electrolyte disorder  - during recent admission, he refused further labs prior to transfer to psych   - recheck BMP     Complications: none;  Murel Bath did not require emergency psychiatric intervention during this admission such as restraint or emergency medication. Vital signs in last 24 hours:  Vitals:    09/24/22 2230   BP: (!) 78/32   Pulse: 80   Resp: 20   Temp: 98.2 °F (36.8 °C)     Condition on Discharge:  Murel Bath was unstable.         Medication List        ASK your doctor about these medications      haloperidol 10 MG tablet  Commonly known as: HALDOL  Take 1 tablet by mouth at bedtime     phosphorus 155-852-130 MG tablet  Commonly known as: K PHOS NEUTRAL  Take 1 tablet by mouth 2 times daily for 5 days     * traZODone 50 MG tablet  Commonly known as: DESYREL  Take 1 tablet by mouth nightly for 30 days. * traZODone 150 MG tablet  Commonly known as: DESYREL  Take 1 tablet by mouth nightly     venlafaxine 37.5 MG extended release capsule  Commonly known as: EFFEXOR XR  Take 1 capsule by mouth every morning (before breakfast)           * This list has 2 medication(s) that are the same as other medications prescribed for you. Read the directions carefully, and ask your doctor or other care provider to review them with you. Follow-up Plan:    Discharged to the ED.

## 2024-03-23 NOTE — ED NOTES
Level of Care Disposition:    Admit      Patient was seen by ED provider and [facility] staff. The case presented to psychiatric provider on-call Jojo Pablo. Based on the ED evaluation and information presented to the provider by Lukas Olmos, it was determined that inpatient hospitalization is the least restrictive environment for the patient at this time. The patient will be admitted to the inpatient unit. Admitting provider did not order suicide precautions because the unit is locked and the environment is safe. RATIONALE FOR ADMISSION:   Patient at imminent risk of danger to self as demonstrated by decompensating since he left [facility].       Insurance Pre certification Authorization: TBD Jonni Bernheim, RN  02/27/20 2101 never

## 2025-07-22 NOTE — GROUP NOTE
Group Therapy Note    Date: 3/7/2020    Group Start Time: 2030  Group End Time: 2045  Group Topic: [address], RN    Patient was invited to attend and chose not to.   Discipline Responsible: Registered Nurse      Signature:  Lolis Riley RN
Home